# Patient Record
Sex: FEMALE | Race: WHITE | HISPANIC OR LATINO | Employment: UNEMPLOYED | URBAN - METROPOLITAN AREA
[De-identification: names, ages, dates, MRNs, and addresses within clinical notes are randomized per-mention and may not be internally consistent; named-entity substitution may affect disease eponyms.]

---

## 2021-03-02 NOTE — PROGRESS NOTES
CC: Missed menses    Chen Santiago is a 20 y.o. No obstetric history on file. who presents presents due to missed menses. Patient's last menstrual period was 2020. Reports she first had a positive pregnancy test on 21 and is thus is 9w3d based off LMP. She was not using anything for birthcontrol.  This is a somewhat planned and desired pregnancy.   Reports she has been feeling fatigued and nauseous thus far in pregnancy. She denies vomiting, denies headache, denies dysuria, denies  vaginal bleeding/spotting, and reports contractions/cramping.    Partner: Jose    Review of systems:  Pertinent positives documented in HPI and all other systems reviewed & are negative    GYN History:  Patient's last menstrual period was 2020.. Menarche @12.  Menses unsure as she has used OCPs and Nexplanon since menses but has had regular menses since Oct when Nexplanon was taken out.  Hasn't had any paps.  No history of cone biopsy, LEEP or any other cervical, uterine or gynecologic surgery. No history of sexually transmitted diseases.  Currently sexually active with one male partner.  Utilizing nothing for contraception and has used Nexplanon and OCPs in the past.     OB History:    OB History    Para Term  AB Living   1 0 0 0 0 0   SAB TAB Ectopic Molar Multiple Live Births   0 0 0 0 0 0      # Outcome Date GA Lbr Power/2nd Weight Sex Delivery Anes PTL Lv   1 Current                All PMH, PSH, allergies, social history and FH reviewed and updated today:  Past Medical History:  History reviewed. No pertinent past medical history.    Past Surgical History:  History reviewed. No pertinent surgical history.    Medications:   Current Outpatient Medications Ordered in Epic   Medication Sig Dispense Refill   • Prenatal Vit-Fe Fumarate-FA (PRENATAL 1+1 PO) Take  by mouth.       No current Epic-ordered facility-administered medications on file.       Allergies: Patient has no known allergies.    Social  History:  Social History     Socioeconomic History   • Marital status: Unknown     Spouse name: Not on file   • Number of children: Not on file   • Years of education: Not on file   • Highest education level: Not on file   Occupational History   • Not on file   Tobacco Use   • Smoking status: Never Smoker   • Smokeless tobacco: Never Used   Substance and Sexual Activity   • Alcohol use: Not Currently   • Drug use: No   • Sexual activity: Yes     Partners: Male     Birth control/protection: None   Other Topics Concern   • Behavioral problems Not Asked   • Interpersonal relationships Not Asked   • Sad or not enjoying activities Not Asked   • Suicidal thoughts Not Asked   • Poor school performance Not Asked   • Reading difficulties Not Asked   • Speech difficulties Not Asked   • Writing difficulties Not Asked   • Inadequate sleep Not Asked   • Excessive TV viewing Not Asked   • Excessive video game use Not Asked   • Inadequate exercise Not Asked   • Sports related Not Asked   • Poor diet Not Asked   • Family concerns for drug/alcohol abuse Not Asked   • Poor oral hygiene Not Asked   • Bike safety Not Asked   • Family concerns vehicle safety Not Asked   Social History Narrative   • Not on file     Social Determinants of Health     Financial Resource Strain:    • Difficulty of Paying Living Expenses:    Food Insecurity:    • Worried About Running Out of Food in the Last Year:    • Ran Out of Food in the Last Year:    Transportation Needs:    • Lack of Transportation (Medical):    • Lack of Transportation (Non-Medical):    Physical Activity:    • Days of Exercise per Week:    • Minutes of Exercise per Session:    Stress:    • Feeling of Stress :    Social Connections:    • Frequency of Communication with Friends and Family:    • Frequency of Social Gatherings with Friends and Family:    • Attends [employer] Services:    • Active Member of Clubs or Organizations:    • Attends Club or Organization Meetings:    • Marital  Status:    Intimate Partner Violence:    • Fear of Current or Ex-Partner:    • Emotionally Abused:    • Physically Abused:    • Sexually Abused:        Family History:  Family History   Problem Relation Age of Onset   • Diabetes Maternal Grandmother            Objective:   Vitals:  /67   Wt 102 kg (224 lb)   There is no height or weight on file to calculate BMI. (Goal BM I>18 <25)  Exam:  General: appears stated age  Head: normocephalic, non-tender  Neck: neck is supple  Abdomen: Bowel sounds positive, nondistended, soft, nontender x4, no rebound or guarding. No organomegaly. No masses.   Female GYN: normal female external genitalia without lesions  Skin: No rashes, or ulcers or lesions seen  Psychiatric: appropriate affect, alert and oriented x3, intact judgment and insight.    Procedure:  Transvaginal US performed by me and per my read:    Indication: confirm dates/location/viability.     Findings: stack intrauterine pregnancy @ 9w1d by CRL. Positive yolk sac. Positive fetal cardiac activity @ 174 BPM. Right ovary not well seen. Left Ovary WNL. Cervical length 4.6cm. No free fluid in the cul-de-sac.    Impression: viable IUP @ 9w1d.  AYAN by US of 10/6/21.    Recent Results (from the past 336 hour(s))   POCT Pregnancy    Collection Time: 03/04/21  8:30 AM   Result Value Ref Range    POC Urine Pregnancy Test Positive Negative    Internal Control Positive Positive     Internal Control Negative Negative       Pregnancy exam/test positive    A/P:   20 y.o. No obstetric history on file. here for   1. DUB (dysfunctional uterine bleeding)  POCT Pregnancy   2. Missed menses     3. Pregnancy test-positive         #Missed menses - amenorrhea due to pregnancy.  US today is c/w LMP of 12/28/20 giving AYAN of 10/4/21.  Discussed pregnancy with patient who is excited.    --Normal pregnancy s/s discussed  --Advised prenatal vitamins, healthy well rounded diet, adequate hydration, and continued exercise.    #Cervical  cancer screening.  Will need first pap at NOB.  #Genetic screening briefly discussed - patient wants to talk with partner and will address further at fu appt    #Will order prenatal labs and any additional imaging/testing needed at new OB visit  #SAB precautions discussed.  #Follow up in 2-4 weeks for new OB visit.    30 minutes were spent in face-to-face patient contact with patient, greater than 50% of which was was spent in counseling and coordination of care for her newly diagnosed pregnancy including medical and surgical options of care.    TRAM

## 2021-03-04 ENCOUNTER — TELEPHONE (OUTPATIENT)
Dept: OBGYN | Facility: CLINIC | Age: 21
End: 2021-03-04

## 2021-03-04 ENCOUNTER — GYNECOLOGY VISIT (OUTPATIENT)
Dept: OBGYN | Facility: CLINIC | Age: 21
End: 2021-03-04
Payer: COMMERCIAL

## 2021-03-04 VITALS — WEIGHT: 224 LBS | DIASTOLIC BLOOD PRESSURE: 67 MMHG | SYSTOLIC BLOOD PRESSURE: 135 MMHG

## 2021-03-04 DIAGNOSIS — N92.6 MISSED MENSES: ICD-10-CM

## 2021-03-04 DIAGNOSIS — N93.8 DUB (DYSFUNCTIONAL UTERINE BLEEDING): ICD-10-CM

## 2021-03-04 DIAGNOSIS — Z32.01 PREGNANCY TEST-POSITIVE: ICD-10-CM

## 2021-03-04 LAB
INT CON NEG: NEGATIVE
INT CON POS: POSITIVE
POC URINE PREGNANCY TEST: POSITIVE

## 2021-03-04 PROCEDURE — 81025 URINE PREGNANCY TEST: CPT | Performed by: OBSTETRICS & GYNECOLOGY

## 2021-03-04 PROCEDURE — 76830 TRANSVAGINAL US NON-OB: CPT | Performed by: OBSTETRICS & GYNECOLOGY

## 2021-03-04 PROCEDURE — 99203 OFFICE O/P NEW LOW 30 MIN: CPT | Mod: 25 | Performed by: OBSTETRICS & GYNECOLOGY

## 2021-03-04 NOTE — NON-PROVIDER
Pt here today for   Pt states no VB or LOF   Reports +FM  Good # [phone]   Pharmacy Confirmed.

## 2021-03-04 NOTE — TELEPHONE ENCOUNTER
Pt called triage line stating she was seen today and forgot to ask doctor about having some rectal bleeding wants to know if it is ok. Notified pt that it could be due to constipation, pt given remedies for constipation. Pt verbalized understanding.

## 2021-03-15 ENCOUNTER — TELEPHONE (OUTPATIENT)
Dept: OBGYN | Facility: CLINIC | Age: 21
End: 2021-03-15

## 2021-03-15 RX ORDER — NITROFURANTOIN 25; 75 MG/1; MG/1
CAPSULE ORAL
Status: ON HOLD | COMMUNITY
Start: 2020-12-22 | End: 2021-09-16

## 2021-03-15 NOTE — TELEPHONE ENCOUNTER
Patient called and she stated that she was having some cramping with no bleeding and her breath being taken away when she is cramping advised pt that she can take tylenol and use a heating pad with a barrier in between like a wet cloth pt understood and agreed then pt stated that she is having pains under her breast that come and go advised pt to see if the tylenol helps advised pt that she can also address this at her upcoming appointment. Pt agreed and understood.

## 2021-04-01 ENCOUNTER — HOSPITAL ENCOUNTER (OUTPATIENT)
Facility: MEDICAL CENTER | Age: 21
End: 2021-04-01
Attending: OBSTETRICS & GYNECOLOGY
Payer: COMMERCIAL

## 2021-04-01 ENCOUNTER — INITIAL PRENATAL (OUTPATIENT)
Dept: OBGYN | Facility: CLINIC | Age: 21
End: 2021-04-01
Payer: COMMERCIAL

## 2021-04-01 ENCOUNTER — HOSPITAL ENCOUNTER (OUTPATIENT)
Dept: LAB | Facility: MEDICAL CENTER | Age: 21
End: 2021-04-01
Attending: OBSTETRICS & GYNECOLOGY
Payer: COMMERCIAL

## 2021-04-01 VITALS
HEIGHT: 63 IN | HEART RATE: 60 BPM | DIASTOLIC BLOOD PRESSURE: 63 MMHG | WEIGHT: 226 LBS | BODY MASS INDEX: 40.04 KG/M2 | SYSTOLIC BLOOD PRESSURE: 138 MMHG

## 2021-04-01 DIAGNOSIS — O99.212 OBESITY AFFECTING PREGNANCY IN SECOND TRIMESTER: ICD-10-CM

## 2021-04-01 DIAGNOSIS — Z34.02 SUPERVISION OF NORMAL FIRST PREGNANCY IN SECOND TRIMESTER: ICD-10-CM

## 2021-04-01 LAB
ABO GROUP BLD: NORMAL
BLD GP AB SCN SERPL QL: NORMAL
RH BLD: NORMAL

## 2021-04-01 PROCEDURE — 87389 HIV-1 AG W/HIV-1&-2 AB AG IA: CPT

## 2021-04-01 PROCEDURE — 86780 TREPONEMA PALLIDUM: CPT

## 2021-04-01 PROCEDURE — 87491 CHLMYD TRACH DNA AMP PROBE: CPT

## 2021-04-01 PROCEDURE — 86901 BLOOD TYPING SEROLOGIC RH(D): CPT

## 2021-04-01 PROCEDURE — 86803 HEPATITIS C AB TEST: CPT

## 2021-04-01 PROCEDURE — 59401 PR NEW OB VISIT: CPT | Performed by: OBSTETRICS & GYNECOLOGY

## 2021-04-01 PROCEDURE — 36415 COLL VENOUS BLD VENIPUNCTURE: CPT

## 2021-04-01 PROCEDURE — 86850 RBC ANTIBODY SCREEN: CPT

## 2021-04-01 PROCEDURE — 86762 RUBELLA ANTIBODY: CPT

## 2021-04-01 PROCEDURE — 85025 COMPLETE CBC W/AUTO DIFF WBC: CPT

## 2021-04-01 PROCEDURE — 87591 N.GONORRHOEAE DNA AMP PROB: CPT

## 2021-04-01 PROCEDURE — 81003 URINALYSIS AUTO W/O SCOPE: CPT

## 2021-04-01 PROCEDURE — 80307 DRUG TEST PRSMV CHEM ANLYZR: CPT

## 2021-04-01 PROCEDURE — 86900 BLOOD TYPING SEROLOGIC ABO: CPT

## 2021-04-01 PROCEDURE — 87340 HEPATITIS B SURFACE AG IA: CPT

## 2021-04-01 NOTE — PROGRESS NOTES
"Subjective:      Chen Santiago is a 20 y.o. female who presents for new OB            HPI patient is a 20-year-old G1 at 13 weeks and 3 days gestation based on first trimester ultrasound who presents today for new OB exam.  Patient is without any complaints or concerns.  Denies any nausea vomiting fevers or dysuria.  Denies headaches.  Denies bleeding or spotting.  Denies pelvic pain.  Patient is taking prenatal vitamins.    Has no history of PID STDs normal Pap smears.  Had Pap smear recently    Family history of birth defects or chromosomal disorders    Works as a dental hygienist    ROS all organ systems are reviewed and are currently negative       Objective:     /63   Pulse 60   Ht 1.6 m (5' 3\")   Wt 103 kg (226 lb)   LMP 12/28/2020   BMI 40.03 kg/m²      Physical Exam  Vitals and nursing note reviewed. Exam conducted with a chaperone present.   Constitutional:       Appearance: Normal appearance. She is obese. She is not toxic-appearing.   HENT:      Head: Normocephalic and atraumatic.      Nose: Nose normal.   Eyes:      General:         Right eye: No discharge.         Left eye: No discharge.      Conjunctiva/sclera: Conjunctivae normal.   Cardiovascular:      Rate and Rhythm: Normal rate.      Pulses: Normal pulses.      Heart sounds: Normal heart sounds. No murmur.   Pulmonary:      Effort: Pulmonary effort is normal. No respiratory distress.      Breath sounds: Normal breath sounds. No wheezing.   Chest:      Comments: Breast exam deferred by patient  Abdominal:      General: Abdomen is flat. Bowel sounds are normal. There is no distension.      Palpations: Abdomen is soft.      Tenderness: There is no abdominal tenderness.   Genitourinary:     General: Normal vulva.      Labia:         Right: No rash, tenderness, lesion or injury.         Left: No rash, tenderness, lesion or injury.       Urethra: No prolapse.      Vagina: No vaginal discharge, tenderness or bleeding.      Uterus: Enlarged. " Not tender.       Adnexa:         Right: No mass, tenderness or fullness.          Left: No mass, tenderness or fullness.        Rectum: No external hemorrhoid.   Musculoskeletal:         General: Normal range of motion.      Cervical back: Normal range of motion and neck supple. No rigidity.      Right lower leg: No edema.      Left lower leg: No edema.   Lymphadenopathy:      Cervical: No cervical adenopathy.      Lower Body: No right inguinal adenopathy. No left inguinal adenopathy.   Skin:     General: Skin is warm and dry.      Coloration: Skin is not jaundiced.      Findings: No bruising.   Neurological:      General: No focal deficit present.      Mental Status: She is alert and oriented to person, place, and time.      Motor: No weakness.      Gait: Gait normal.   Psychiatric:         Mood and Affect: Mood normal.         Behavior: Behavior normal.         Thought Content: Thought content normal.         Judgment: Judgment normal.                 Assessment/Plan:        1. Supervision of normal first pregnancy in second trimester  20-year-old G1 presenting for new OB exam today.  Exam is within normal limits.  Pregnancy precautions and restrictions discussed.  We discussed lifting restrictions and exercise in pregnancy  We discussed carrier screening options and aneuploidy screening options in pregnancy-and any of these testing    We discussed diet, recommended weight gain and caloric intake, we discussed recommended fluid intake, exercise and travel discussed  - PREG CNTR PRENATAL PN; Future  - URINE DRUG SCREEN; Future  - US-OB 2ND 3RD TRI COMPLETE; Future  - HEP C VIRUS ANTIBODY; Future  - Chlamydia/GC PCR Urine Or Swab; Future    2. Obesity affecting pregnancy in second trimester  We discussed obesity risks and recommended weight gain in pregnancy    3.  Pregnancy precautions and plan of care reviewed.  Patient to follow-up in 4 weeks for OB followup

## 2021-04-01 NOTE — NON-PROVIDER
NOB today  LMP: 12/28/2020  Last pap: requesting  Phone # [phone]  Pharmacy confirmed  On PNV  Cystic Fibrosis test offered.  C/o some cramping

## 2021-04-02 LAB
APPEARANCE UR: CLEAR
BASOPHILS # BLD AUTO: 0.6 % (ref 0–1.8)
BASOPHILS # BLD: 0.1 K/UL (ref 0–0.12)
BILIRUB UR QL STRIP.AUTO: NEGATIVE
COLOR UR: YELLOW
EOSINOPHIL # BLD AUTO: 0.37 K/UL (ref 0–0.51)
EOSINOPHIL NFR BLD: 2.2 % (ref 0–6.9)
ERYTHROCYTE [DISTWIDTH] IN BLOOD BY AUTOMATED COUNT: 41.2 FL (ref 35.9–50)
GLUCOSE UR STRIP.AUTO-MCNC: NEGATIVE MG/DL
HBV SURFACE AG SER QL: ABNORMAL
HCT VFR BLD AUTO: 39.8 % (ref 37–47)
HCV AB SER QL: NORMAL
HGB BLD-MCNC: 13.1 G/DL (ref 12–16)
HIV 1+2 AB+HIV1 P24 AG SERPL QL IA: NORMAL
IMM GRANULOCYTES # BLD AUTO: 0.21 K/UL (ref 0–0.11)
IMM GRANULOCYTES NFR BLD AUTO: 1.3 % (ref 0–0.9)
KETONES UR STRIP.AUTO-MCNC: NEGATIVE MG/DL
LEUKOCYTE ESTERASE UR QL STRIP.AUTO: NEGATIVE
LYMPHOCYTES # BLD AUTO: 3.04 K/UL (ref 1–4.8)
LYMPHOCYTES NFR BLD: 18.2 % (ref 22–41)
MCH RBC QN AUTO: 30 PG (ref 27–33)
MCHC RBC AUTO-ENTMCNC: 32.9 G/DL (ref 33.6–35)
MCV RBC AUTO: 91.1 FL (ref 81.4–97.8)
MICRO URNS: ABNORMAL
MONOCYTES # BLD AUTO: 1.03 K/UL (ref 0–0.85)
MONOCYTES NFR BLD AUTO: 6.2 % (ref 0–13.4)
NEUTROPHILS # BLD AUTO: 11.94 K/UL (ref 2–7.15)
NEUTROPHILS NFR BLD: 71.5 % (ref 44–72)
NITRITE UR QL STRIP.AUTO: NEGATIVE
NRBC # BLD AUTO: 0 K/UL
NRBC BLD-RTO: 0 /100 WBC
PH UR STRIP.AUTO: 6 [PH] (ref 5–8)
PLATELET # BLD AUTO: 279 K/UL (ref 164–446)
PMV BLD AUTO: 12.1 FL (ref 9–12.9)
PROT UR QL STRIP: NEGATIVE MG/DL
RBC # BLD AUTO: 4.37 M/UL (ref 4.2–5.4)
RBC UR QL AUTO: NEGATIVE
RUBV AB SER QL: 254 IU/ML
SP GR UR STRIP.AUTO: 1.01
TREPONEMA PALLIDUM IGG+IGM AB [PRESENCE] IN SERUM OR PLASMA BY IMMUNOASSAY: ABNORMAL
UROBILINOGEN UR STRIP.AUTO-MCNC: 0.2 MG/DL
WBC # BLD AUTO: 16.7 K/UL (ref 4.8–10.8)

## 2021-04-03 LAB
AMPHET CTO UR CFM-MCNC: NEGATIVE NG/ML
BARBITURATES CTO UR CFM-MCNC: NEGATIVE NG/ML
BENZODIAZ CTO UR CFM-MCNC: NEGATIVE NG/ML
C TRACH DNA SPEC QL NAA+PROBE: NEGATIVE
CANNABINOIDS CTO UR CFM-MCNC: NEGATIVE NG/ML
COCAINE CTO UR CFM-MCNC: NEGATIVE NG/ML
DRUG COMMENT 753798: NORMAL
METHADONE CTO UR CFM-MCNC: NEGATIVE NG/ML
N GONORRHOEA DNA SPEC QL NAA+PROBE: NEGATIVE
OPIATES CTO UR CFM-MCNC: NEGATIVE NG/ML
PCP CTO UR CFM-MCNC: NEGATIVE NG/ML
PROPOXYPH CTO UR CFM-MCNC: NEGATIVE NG/ML
SPECIMEN SOURCE: NORMAL

## 2021-04-29 ENCOUNTER — ROUTINE PRENATAL (OUTPATIENT)
Dept: OBGYN | Facility: CLINIC | Age: 21
End: 2021-04-29
Payer: COMMERCIAL

## 2021-04-29 VITALS — WEIGHT: 233.6 LBS | SYSTOLIC BLOOD PRESSURE: 135 MMHG | BODY MASS INDEX: 41.38 KG/M2 | DIASTOLIC BLOOD PRESSURE: 55 MMHG

## 2021-04-29 DIAGNOSIS — O99.212 OBESITY AFFECTING PREGNANCY IN SECOND TRIMESTER: ICD-10-CM

## 2021-04-29 PROBLEM — Z34.02 SUPERVISION OF NORMAL FIRST PREGNANCY IN SECOND TRIMESTER: Status: RESOLVED | Noted: 2021-04-01 | Resolved: 2021-04-29

## 2021-04-29 PROCEDURE — 90040 PR PRENATAL FOLLOW UP: CPT | Performed by: OBSTETRICS & GYNECOLOGY

## 2021-04-29 NOTE — PROGRESS NOTES
OB Follow Up--- High Risk  Intermittent  HA     Elevated BP    , increased  BMI   20 y.o. is a 17w3d presents in prenatal follow up.    Subjective:headaches : non medicated ,   . Fetal Movement  positive - light     Problem List:has BMI (body mass index), pediatric 95-99% for age, obese child structured weight management/multidisciplinary intervention category; Acanthosis nigricans; Supervision of normal first pregnancy in second trimester; and Obesity affecting pregnancy in second trimester on their problem list.     Objective:   /85 (BP Location: Right arm, Patient Position: Sitting)   Wt 106 kg (233 lb 9.6 oz)   LMP 12/28/2020   BMI 41.38 kg/m²   F/U /55    Abdomen:  S=D  Extremities:Normal        Lab:No results found for this or any previous visit (from the past 336 hour(s)).      Assessment:    17w3d   High Risk  Intermittent  HA     Elevated BP    , increased  BMI   No pain, bleeding, unusual discharge or leeking    Plan:  Take tylenol , hydrate , RTC 1 week b=for BP check   At risk for  PIH   Continue water intake  Ambulate nightly after 37 weeks  Continue Kick counts

## 2021-04-29 NOTE — PROGRESS NOTES
OB follow up   + fetal movement.  No VB, LOF or UC's.  Phone # [phone]  Preferred pharmacy confirmed.

## 2021-05-06 ENCOUNTER — NON-PROVIDER VISIT (OUTPATIENT)
Dept: OBGYN | Facility: CLINIC | Age: 21
End: 2021-05-06
Payer: COMMERCIAL

## 2021-05-06 NOTE — NON-PROVIDER
Pt here for BP check per Dr. Loving  Consulted with Dr. Winston and pt BP is great. Gave BP precautions.

## 2021-05-24 ENCOUNTER — HOSPITAL ENCOUNTER (OUTPATIENT)
Dept: RADIOLOGY | Facility: MEDICAL CENTER | Age: 21
End: 2021-05-24
Attending: OBSTETRICS & GYNECOLOGY
Payer: COMMERCIAL

## 2021-05-24 DIAGNOSIS — Z34.02 SUPERVISION OF NORMAL FIRST PREGNANCY IN SECOND TRIMESTER: ICD-10-CM

## 2021-05-24 PROCEDURE — 76805 OB US >/= 14 WKS SNGL FETUS: CPT

## 2021-05-26 ENCOUNTER — DATING (OUTPATIENT)
Dept: OBGYN | Facility: CLINIC | Age: 21
End: 2021-05-26

## 2021-05-27 ENCOUNTER — ROUTINE PRENATAL (OUTPATIENT)
Dept: OBGYN | Facility: CLINIC | Age: 21
End: 2021-05-27
Payer: COMMERCIAL

## 2021-05-27 VITALS — DIASTOLIC BLOOD PRESSURE: 62 MMHG | BODY MASS INDEX: 41.38 KG/M2 | WEIGHT: 233.6 LBS | SYSTOLIC BLOOD PRESSURE: 118 MMHG

## 2021-05-27 DIAGNOSIS — Z34.92 SECOND TRIMESTER PREGNANCY: ICD-10-CM

## 2021-05-27 PROCEDURE — 90040 PR PRENATAL FOLLOW UP: CPT | Performed by: OBSTETRICS & GYNECOLOGY

## 2021-05-27 NOTE — PROGRESS NOTES
Pt's here for OB follow-up  Reports + fetal movement  No VB, LOF or UC's.  Good Phone #:[phone]  Pharmacy verified.  Pt states having mild cramps for on and off.  Pt states no complaints for today.  Pt declines AFP.

## 2021-06-24 ENCOUNTER — ROUTINE PRENATAL (OUTPATIENT)
Dept: OBGYN | Facility: CLINIC | Age: 21
End: 2021-06-24
Payer: COMMERCIAL

## 2021-06-24 VITALS — SYSTOLIC BLOOD PRESSURE: 126 MMHG | DIASTOLIC BLOOD PRESSURE: 58 MMHG

## 2021-06-24 DIAGNOSIS — Z34.00 SUPERVISION OF NORMAL FIRST PREGNANCY, ANTEPARTUM: Primary | ICD-10-CM

## 2021-06-24 PROCEDURE — 90040 PR PRENATAL FOLLOW UP: CPT | Performed by: NURSE PRACTITIONER

## 2021-06-24 NOTE — PROGRESS NOTES
No complaints today  Caught up with first trimester labs and US done  3rd trimester labs ordered  Tdap and FKC sheet next visit   labor precautions reviewed  All questions answered    Kim Stein CNM, APRN

## 2021-06-24 NOTE — NON-PROVIDER
OB follow up   + fetal movement.  No VB, LOF or UC's.  Phone # [phone]  Preferred pharmacy confirmed.

## 2021-07-06 ENCOUNTER — HOSPITAL ENCOUNTER (OUTPATIENT)
Dept: LAB | Facility: MEDICAL CENTER | Age: 21
End: 2021-07-06
Attending: NURSE PRACTITIONER
Payer: COMMERCIAL

## 2021-07-06 DIAGNOSIS — Z34.00 SUPERVISION OF NORMAL FIRST PREGNANCY, ANTEPARTUM: ICD-10-CM

## 2021-07-06 LAB
BASOPHILS # BLD AUTO: 0.6 % (ref 0–1.8)
BASOPHILS # BLD: 0.11 K/UL (ref 0–0.12)
EOSINOPHIL # BLD AUTO: 0.15 K/UL (ref 0–0.51)
EOSINOPHIL NFR BLD: 0.9 % (ref 0–6.9)
ERYTHROCYTE [DISTWIDTH] IN BLOOD BY AUTOMATED COUNT: 44 FL (ref 35.9–50)
GLUCOSE 1H P 50 G GLC PO SERPL-MCNC: 107 MG/DL (ref 70–139)
HCT VFR BLD AUTO: 37.6 % (ref 37–47)
HGB BLD-MCNC: 12.5 G/DL (ref 12–16)
IMM GRANULOCYTES # BLD AUTO: 0.44 K/UL (ref 0–0.11)
IMM GRANULOCYTES NFR BLD AUTO: 2.5 % (ref 0–0.9)
LYMPHOCYTES # BLD AUTO: 2.54 K/UL (ref 1–4.8)
LYMPHOCYTES NFR BLD: 14.4 % (ref 22–41)
MCH RBC QN AUTO: 30.6 PG (ref 27–33)
MCHC RBC AUTO-ENTMCNC: 33.2 G/DL (ref 33.6–35)
MCV RBC AUTO: 91.9 FL (ref 81.4–97.8)
MONOCYTES # BLD AUTO: 1.41 K/UL (ref 0–0.85)
MONOCYTES NFR BLD AUTO: 8 % (ref 0–13.4)
NEUTROPHILS # BLD AUTO: 12.98 K/UL (ref 2–7.15)
NEUTROPHILS NFR BLD: 73.6 % (ref 44–72)
NRBC # BLD AUTO: 0 K/UL
NRBC BLD-RTO: 0 /100 WBC
PLATELET # BLD AUTO: 273 K/UL (ref 164–446)
PMV BLD AUTO: 10.9 FL (ref 9–12.9)
RBC # BLD AUTO: 4.09 M/UL (ref 4.2–5.4)
WBC # BLD AUTO: 17.6 K/UL (ref 4.8–10.8)

## 2021-07-06 PROCEDURE — 86780 TREPONEMA PALLIDUM: CPT

## 2021-07-06 PROCEDURE — 36415 COLL VENOUS BLD VENIPUNCTURE: CPT

## 2021-07-06 PROCEDURE — 85025 COMPLETE CBC W/AUTO DIFF WBC: CPT

## 2021-07-06 PROCEDURE — 82950 GLUCOSE TEST: CPT

## 2021-07-07 LAB — TREPONEMA PALLIDUM IGG+IGM AB [PRESENCE] IN SERUM OR PLASMA BY IMMUNOASSAY: NORMAL

## 2021-07-15 ENCOUNTER — ROUTINE PRENATAL (OUTPATIENT)
Dept: OBGYN | Facility: CLINIC | Age: 21
End: 2021-07-15
Payer: COMMERCIAL

## 2021-07-15 VITALS — BODY MASS INDEX: 43.2 KG/M2 | SYSTOLIC BLOOD PRESSURE: 138 MMHG | DIASTOLIC BLOOD PRESSURE: 66 MMHG | WEIGHT: 243.9 LBS

## 2021-07-15 DIAGNOSIS — O16.3 ELEVATED BLOOD PRESSURE AFFECTING PREGNANCY IN THIRD TRIMESTER, ANTEPARTUM: ICD-10-CM

## 2021-07-15 PROCEDURE — 90040 PR PRENATAL FOLLOW UP: CPT | Performed by: OBSTETRICS & GYNECOLOGY

## 2021-07-15 NOTE — LETTER
"Count Your Baby's Movements  Another step to a healthy delivery  Chen Santiago Candy             Dept: [phone]    How Many Weeks Pregnant:28w3d    Date to Begin Countin/15/2021              How to use this chart    One way for your physician to keep track of your baby's health is by knowing how often the baby moves (or \"kicks\") in your womb.  You can help your physician to do this by using this chart every day.    Every day, you should see how many hours it takes for your baby to move 10 times.  Start in the morning, as soon as you get up.    · First, write down the time your baby moves until you get to 10.  · Check off one box every time your baby moves until you get to 10.  · Write down the time you finished counting in the last column.  · Total how long it took to count up all 10 movements.  · Finally, fill in the box that shows how long this took.  After counting 10 movements, you no longer have to count any more that day.  The next morning, just start counting again as soon as you get up.    What should you call a \"movement\"?  It is hard to say, because it will feel different from one mother to another and from one pregnancy to the next.  The important thing is that you count the movements the same way throughout your pregnancy.  If you have more questions, you should ask your physician.    Count carefully every day!  SAMPLE:  Week 28    How many hours did it take to feel 10 movements?       Start  Time     1     2     3     4     5     6     7     8     9     10   Finish Time   Mon 8:20 ·  ·  ·  ·  ·  ·  ·  ·  ·  ·  11:40                  Sat               Sun                 IMPORTANT: You should contact your physician if it takes more than two hours for you to feel 10 movements.  Each morning, write down the time and start to count the movements of your baby.  Keep track by checking off one box every time you feel one movement.  When you have " "felt 10 \"kicks\", write down the time you finished counting in the last column.  Then fill in the   box (over the check luiz) for the number of hours it took.  Be sure to read the complete instructions on the previous page.            "

## 2021-07-20 ENCOUNTER — HOSPITAL ENCOUNTER (OUTPATIENT)
Dept: LAB | Facility: MEDICAL CENTER | Age: 21
End: 2021-07-20
Attending: OBSTETRICS & GYNECOLOGY
Payer: COMMERCIAL

## 2021-07-20 DIAGNOSIS — O16.3 ELEVATED BLOOD PRESSURE AFFECTING PREGNANCY IN THIRD TRIMESTER, ANTEPARTUM: ICD-10-CM

## 2021-07-20 LAB
ALBUMIN SERPL BCP-MCNC: 3.4 G/DL (ref 3.2–4.9)
ALBUMIN/GLOB SERPL: 1.1 G/DL
ALP SERPL-CCNC: 99 U/L (ref 30–99)
ALT SERPL-CCNC: 14 U/L (ref 2–50)
ANION GAP SERPL CALC-SCNC: 12 MMOL/L (ref 7–16)
AST SERPL-CCNC: 17 U/L (ref 12–45)
BASOPHILS # BLD AUTO: 0.5 % (ref 0–1.8)
BASOPHILS # BLD: 0.09 K/UL (ref 0–0.12)
BILIRUB SERPL-MCNC: <0.2 MG/DL (ref 0.1–1.5)
BUN SERPL-MCNC: 8 MG/DL (ref 8–22)
CALCIUM SERPL-MCNC: 9.2 MG/DL (ref 8.5–10.5)
CHLORIDE SERPL-SCNC: 104 MMOL/L (ref 96–112)
CO2 SERPL-SCNC: 22 MMOL/L (ref 20–33)
CREAT SERPL-MCNC: 0.44 MG/DL (ref 0.5–1.4)
EOSINOPHIL # BLD AUTO: 0.13 K/UL (ref 0–0.51)
EOSINOPHIL NFR BLD: 0.8 % (ref 0–6.9)
ERYTHROCYTE [DISTWIDTH] IN BLOOD BY AUTOMATED COUNT: 43.4 FL (ref 35.9–50)
GLOBULIN SER CALC-MCNC: 3.1 G/DL (ref 1.9–3.5)
GLUCOSE SERPL-MCNC: 84 MG/DL (ref 65–99)
HCT VFR BLD AUTO: 38.3 % (ref 37–47)
HGB BLD-MCNC: 12.6 G/DL (ref 12–16)
IMM GRANULOCYTES # BLD AUTO: 0.7 K/UL (ref 0–0.11)
IMM GRANULOCYTES NFR BLD AUTO: 4 % (ref 0–0.9)
LYMPHOCYTES # BLD AUTO: 2.5 K/UL (ref 1–4.8)
LYMPHOCYTES NFR BLD: 14.4 % (ref 22–41)
MCH RBC QN AUTO: 30.3 PG (ref 27–33)
MCHC RBC AUTO-ENTMCNC: 32.9 G/DL (ref 33.6–35)
MCV RBC AUTO: 92.1 FL (ref 81.4–97.8)
MONOCYTES # BLD AUTO: 1.43 K/UL (ref 0–0.85)
MONOCYTES NFR BLD AUTO: 8.3 % (ref 0–13.4)
NEUTROPHILS # BLD AUTO: 12.46 K/UL (ref 2–7.15)
NEUTROPHILS NFR BLD: 72 % (ref 44–72)
NRBC # BLD AUTO: 0 K/UL
NRBC BLD-RTO: 0 /100 WBC
PLATELET # BLD AUTO: 283 K/UL (ref 164–446)
PMV BLD AUTO: 11.3 FL (ref 9–12.9)
POTASSIUM SERPL-SCNC: 3.9 MMOL/L (ref 3.6–5.5)
PROT SERPL-MCNC: 6.5 G/DL (ref 6–8.2)
RBC # BLD AUTO: 4.16 M/UL (ref 4.2–5.4)
SODIUM SERPL-SCNC: 138 MMOL/L (ref 135–145)
WBC # BLD AUTO: 17.3 K/UL (ref 4.8–10.8)

## 2021-07-20 PROCEDURE — 82570 ASSAY OF URINE CREATININE: CPT

## 2021-07-20 PROCEDURE — 36415 COLL VENOUS BLD VENIPUNCTURE: CPT

## 2021-07-20 PROCEDURE — 80053 COMPREHEN METABOLIC PANEL: CPT

## 2021-07-20 PROCEDURE — 85025 COMPLETE CBC W/AUTO DIFF WBC: CPT

## 2021-07-20 PROCEDURE — 84156 ASSAY OF PROTEIN URINE: CPT

## 2021-07-21 LAB
CREAT UR-MCNC: 95.76 MG/DL
PROT UR-MCNC: 9 MG/DL (ref 0–15)
PROT/CREAT UR: 94 MG/G (ref 10–107)

## 2021-07-26 ENCOUNTER — TELEPHONE (OUTPATIENT)
Dept: OBGYN | Facility: CLINIC | Age: 21
End: 2021-07-26

## 2021-07-26 NOTE — TELEPHONE ENCOUNTER
3:00pm Called patient regarding FMLA request dates.    Patient answered and request dates are 10/1/2021 through 01/01/2022.    Did inform patient that these dates exceed the 12 weeks of FMLA pt understood and stated it is fine. No further questions asked.

## 2021-07-27 ENCOUNTER — TELEPHONE (OUTPATIENT)
Dept: OBGYN | Facility: CLINIC | Age: 21
End: 2021-07-27

## 2021-07-27 NOTE — TELEPHONE ENCOUNTER
Called patient to inform that FMLA is completed with confirmation fax  and will be available for  at .     Patient understood no further questions asked.

## 2021-08-03 ENCOUNTER — ROUTINE PRENATAL (OUTPATIENT)
Dept: OBGYN | Facility: CLINIC | Age: 21
End: 2021-08-03
Payer: COMMERCIAL

## 2021-08-03 VITALS — WEIGHT: 244 LBS | SYSTOLIC BLOOD PRESSURE: 128 MMHG | DIASTOLIC BLOOD PRESSURE: 63 MMHG | BODY MASS INDEX: 43.22 KG/M2

## 2021-08-03 DIAGNOSIS — Z34.00 SUPERVISION OF NORMAL FIRST PREGNANCY, ANTEPARTUM: ICD-10-CM

## 2021-08-03 DIAGNOSIS — O99.213 OBESITY AFFECTING PREGNANCY IN THIRD TRIMESTER: ICD-10-CM

## 2021-08-03 PROCEDURE — 90040 PR PRENATAL FOLLOW UP: CPT | Performed by: OBSTETRICS & GYNECOLOGY

## 2021-08-03 PROCEDURE — 90715 TDAP VACCINE 7 YRS/> IM: CPT | Performed by: OBSTETRICS & GYNECOLOGY

## 2021-08-03 PROCEDURE — 90471 IMMUNIZATION ADMIN: CPT | Performed by: OBSTETRICS & GYNECOLOGY

## 2021-08-03 RX ORDER — BREAST PUMP
EACH MISCELLANEOUS
Qty: 1 EACH | Refills: 0 | Status: SHIPPED | OUTPATIENT
Start: 2021-08-03 | End: 2022-03-01

## 2021-08-03 ASSESSMENT — FIBROSIS 4 INDEX: FIB4 SCORE: 0.34

## 2021-08-03 NOTE — PROGRESS NOTES
S: Pt presents for routine OB follow up. patient is doing well except for some heartburn and we discussed over-the-counter treatment options and dietary modification. Reports good fetal movement.  Denies headaches or scotoma.  Reports normal bowel and bladder functions doing well currently  No contractions, vaginal bleeding, or leakage of fluid.    Questions answered.    O: /63   Wt 111 kg (244 lb)   LMP 12/28/2020   BMI 43.22 kg/m²   Patients' weight gain, fluid intake and exercise level discussed.  Vitals, fundal height , fetal position, and FHR reviewed on flowsheet    Lab:  Recent Results (from the past 336 hour(s))   CBC WITH DIFFERENTIAL    Collection Time: 07/20/21  5:41 PM   Result Value Ref Range    WBC 17.3 (H) 4.8 - 10.8 K/uL    RBC 4.16 (L) 4.20 - 5.40 M/uL    Hemoglobin 12.6 12.0 - 16.0 g/dL    Hematocrit 38.3 37.0 - 47.0 %    MCV 92.1 81.4 - 97.8 fL    MCH 30.3 27.0 - 33.0 pg    MCHC 32.9 (L) 33.6 - 35.0 g/dL    RDW 43.4 35.9 - 50.0 fL    Platelet Count 283 164 - 446 K/uL    MPV 11.3 9.0 - 12.9 fL    Neutrophils-Polys 72.00 44.00 - 72.00 %    Lymphocytes 14.40 (L) 22.00 - 41.00 %    Monocytes 8.30 0.00 - 13.40 %    Eosinophils 0.80 0.00 - 6.90 %    Basophils 0.50 0.00 - 1.80 %    Immature Granulocytes 4.00 (H) 0.00 - 0.90 %    Nucleated RBC 0.00 /100 WBC    Neutrophils (Absolute) 12.46 (H) 2.00 - 7.15 K/uL    Lymphs (Absolute) 2.50 1.00 - 4.80 K/uL    Monos (Absolute) 1.43 (H) 0.00 - 0.85 K/uL    Eos (Absolute) 0.13 0.00 - 0.51 K/uL    Baso (Absolute) 0.09 0.00 - 0.12 K/uL    Immature Granulocytes (abs) 0.70 (H) 0.00 - 0.11 K/uL    NRBC (Absolute) 0.00 K/uL   Comp Metabolic Panel    Collection Time: 07/20/21  5:41 PM   Result Value Ref Range    Sodium 138 135 - 145 mmol/L    Potassium 3.9 3.6 - 5.5 mmol/L    Chloride 104 96 - 112 mmol/L    Co2 22 20 - 33 mmol/L    Anion Gap 12.0 7.0 - 16.0    Glucose 84 65 - 99 mg/dL    Bun 8 8 - 22 mg/dL    Creatinine 0.44 (L) 0.50 - 1.40 mg/dL    Calcium 9.2  8.5 - 10.5 mg/dL    AST(SGOT) 17 12 - 45 U/L    ALT(SGPT) 14 2 - 50 U/L    Alkaline Phosphatase 99 30 - 99 U/L    Total Bilirubin <0.2 0.1 - 1.5 mg/dL    Albumin 3.4 3.2 - 4.9 g/dL    Total Protein 6.5 6.0 - 8.2 g/dL    Globulin 3.1 1.9 - 3.5 g/dL    A-G Ratio 1.1 g/dL   PROTEIN/CREAT RATIO URINE    Collection Time: 21  5:41 PM   Result Value Ref Range    Total Protein, Urine 9.0 0.0 - 15.0 mg/dL    Creatinine, Random Urine 95.76 mg/dL    Protein Creatinine Ratio 94 10 - 107 mg/g   ESTIMATED GFR    Collection Time: 21  5:41 PM   Result Value Ref Range    GFR If African American >60 >60 mL/min/1.73 m 2    GFR If Non African American >60 >60 mL/min/1.73 m 2       A/P:  20 y.o.  at 31w1d presents for routine obstetric follow-up.  Patient is doing well except for heartburn and we discussed use of Tums or Pepcid.  Labs were reviewed with patient  Size equals dates.  Tdap vaccine given today.  We discussed choosing a pediatrician breast-feeding and car seat.  Prescription for breast pump given    1.  Continue prenatal vitamins.  2.  Fetal kick counts daily reviewed   3.  Exercise at least 30 minutes daily.  4.  Drink at least 2L of water daily  5. pregnancy and  Labor precautions educated.  6.  Follow-up in 2 weeks.  7.  GBS @ 36 wks

## 2021-08-11 ENCOUNTER — ROUTINE PRENATAL (OUTPATIENT)
Dept: OBGYN | Facility: CLINIC | Age: 21
End: 2021-08-11
Payer: COMMERCIAL

## 2021-08-11 VITALS — BODY MASS INDEX: 43.93 KG/M2 | WEIGHT: 248 LBS | SYSTOLIC BLOOD PRESSURE: 96 MMHG | DIASTOLIC BLOOD PRESSURE: 66 MMHG

## 2021-08-11 DIAGNOSIS — Z34.00 SUPERVISION OF NORMAL FIRST PREGNANCY, ANTEPARTUM: ICD-10-CM

## 2021-08-11 PROCEDURE — 90040 PR PRENATAL FOLLOW UP: CPT | Performed by: OBSTETRICS & GYNECOLOGY

## 2021-08-11 ASSESSMENT — FIBROSIS 4 INDEX: FIB4 SCORE: 0.34

## 2021-08-11 NOTE — PROGRESS NOTES
OB Followup;    32w2d    Patient Active Problem List    Diagnosis Date Noted   • Supervision of normal first pregnancy, antepartum 2021   • Obesity affecting pregnancy in third trimester 2021   • BMI (body mass index), pediatric 95-99% for age, obese child structured weight management/multidisciplinary intervention category 2016   • Acanthosis nigricans 2016       Vitals:    21 0828 21 0829   BP: 139/69 (!) 96/66   Weight: 112 kg (248 lb)        Patient presents for followup of OB care. Currently doing well . 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    Signs and symptoms of labor/ labor discussed   Discussed our group's policy on prenatal checkups and delivery  Discussed Covid precautions  Discussed Covid vaccination recommendations from CDC/ACOG  Discussed proper exercise during pregnancy  Discussed proper oral fluid hydration  Currently taking prenatal vitamins as instructed  Reviewed fetal kick counts and appropriate fetal movement during pregnancy  Reviewed postpartum birth control methods  All questions answered in detail    Followup in  2 weeks

## 2021-08-24 ENCOUNTER — ROUTINE PRENATAL (OUTPATIENT)
Dept: OBGYN | Facility: CLINIC | Age: 21
End: 2021-08-24
Payer: COMMERCIAL

## 2021-08-24 VITALS — DIASTOLIC BLOOD PRESSURE: 57 MMHG | WEIGHT: 253 LBS | BODY MASS INDEX: 44.82 KG/M2 | SYSTOLIC BLOOD PRESSURE: 132 MMHG

## 2021-08-24 DIAGNOSIS — O99.213 OBESITY AFFECTING PREGNANCY IN THIRD TRIMESTER: ICD-10-CM

## 2021-08-24 DIAGNOSIS — Z34.00 SUPERVISION OF NORMAL FIRST PREGNANCY, ANTEPARTUM: ICD-10-CM

## 2021-08-24 PROCEDURE — 90040 PR PRENATAL FOLLOW UP: CPT | Performed by: OBSTETRICS & GYNECOLOGY

## 2021-08-24 ASSESSMENT — FIBROSIS 4 INDEX: FIB4 SCORE: 0.34

## 2021-08-24 NOTE — PROGRESS NOTES
S: Pt presents for routine OB follow up.  Doing well.  Reports good fetal movement.  No contractions, vaginal bleeding, or leakage of fluid.    Questions answered.    O: /57   Wt 115 kg (253 lb)   LMP 2020   BMI 44.82 kg/m²   Patients' weight gain, fluid intake and exercise level discussed.  Vitals, fundal height , fetal position, and FHR reviewed on flowsheet    Lab:No results found for this or any previous visit (from the past 336 hour(s)).    A/P:  20 y.o.  at 34w1d presents for routine obstetric follow-up.  Doing well  Size equals dates   Encounter Diagnoses   Name Primary?   • Supervision of normal first pregnancy, antepartum    • Obesity affecting pregnancy in third trimester          1.  Continue prenatal vitamins.  2.  Fetal kick counts daily discussed.  3.  Exercise at least 30 minutes daily.  4.  Drink at least 2L of water daily  5.  Pregnancy and  labor precautions educated.  6.  Follow-up in 2 weeks.  7.  GBS culture at 36 weeks discussed

## 2021-08-29 ENCOUNTER — HOSPITAL ENCOUNTER (EMERGENCY)
Facility: MEDICAL CENTER | Age: 21
End: 2021-08-29
Attending: OBSTETRICS & GYNECOLOGY | Admitting: OBSTETRICS & GYNECOLOGY
Payer: COMMERCIAL

## 2021-08-29 VITALS
TEMPERATURE: 97 F | OXYGEN SATURATION: 96 % | RESPIRATION RATE: 16 BRPM | SYSTOLIC BLOOD PRESSURE: 135 MMHG | WEIGHT: 254 LBS | BODY MASS INDEX: 45 KG/M2 | HEART RATE: 77 BPM | HEIGHT: 63 IN | DIASTOLIC BLOOD PRESSURE: 60 MMHG

## 2021-08-29 PROCEDURE — 59025 FETAL NON-STRESS TEST: CPT

## 2021-08-29 PROCEDURE — 302449 STATCHG TRIAGE ONLY (STATISTIC)

## 2021-08-29 ASSESSMENT — FIBROSIS 4 INDEX: FIB4 SCORE: 0.34

## 2021-08-29 NOTE — PROGRESS NOTES
20 y.o.  EDC 10/4 (34.6 wks)     Pt presents to L&D c/o decreased FM since last night. She states baby normally moves the most at night, but wasn't moving much throughout the night. Denies LOF/VB/UCs. Pt states that within the half hour that she's been here, she has felt 4-5 fetal movements and feels reassured. Reactive NST obtained. Dr. Thakkar notified. D/c order received. D/c instructions reviewed with pt.

## 2021-09-07 ENCOUNTER — HOSPITAL ENCOUNTER (EMERGENCY)
Facility: MEDICAL CENTER | Age: 21
End: 2021-09-07
Attending: OBSTETRICS & GYNECOLOGY | Admitting: OBSTETRICS & GYNECOLOGY
Payer: COMMERCIAL

## 2021-09-07 VITALS
TEMPERATURE: 97.9 F | BODY MASS INDEX: 44.65 KG/M2 | OXYGEN SATURATION: 96 % | SYSTOLIC BLOOD PRESSURE: 130 MMHG | RESPIRATION RATE: 18 BRPM | WEIGHT: 252 LBS | DIASTOLIC BLOOD PRESSURE: 64 MMHG | HEART RATE: 66 BPM | HEIGHT: 63 IN

## 2021-09-07 LAB
A1 MICROGLOB PLACENTAL VAG QL: NEGATIVE
ALBUMIN SERPL BCP-MCNC: 3.2 G/DL (ref 3.2–4.9)
ALBUMIN/GLOB SERPL: 1 G/DL
ALP SERPL-CCNC: 182 U/L (ref 30–99)
ALT SERPL-CCNC: 17 U/L (ref 2–50)
ANION GAP SERPL CALC-SCNC: 13 MMOL/L (ref 7–16)
AST SERPL-CCNC: 20 U/L (ref 12–45)
BASOPHILS # BLD AUTO: 0.5 % (ref 0–1.8)
BASOPHILS # BLD: 0.08 K/UL (ref 0–0.12)
BILIRUB SERPL-MCNC: 0.2 MG/DL (ref 0.1–1.5)
BUN SERPL-MCNC: 10 MG/DL (ref 8–22)
CALCIUM SERPL-MCNC: 8.9 MG/DL (ref 8.5–10.5)
CHLORIDE SERPL-SCNC: 104 MMOL/L (ref 96–112)
CO2 SERPL-SCNC: 20 MMOL/L (ref 20–33)
CREAT SERPL-MCNC: 0.43 MG/DL (ref 0.5–1.4)
CREAT UR-MCNC: 104.48 MG/DL
EOSINOPHIL # BLD AUTO: 0.17 K/UL (ref 0–0.51)
EOSINOPHIL NFR BLD: 1.1 % (ref 0–6.9)
ERYTHROCYTE [DISTWIDTH] IN BLOOD BY AUTOMATED COUNT: 45 FL (ref 35.9–50)
GLOBULIN SER CALC-MCNC: 3.3 G/DL (ref 1.9–3.5)
GLUCOSE SERPL-MCNC: 77 MG/DL (ref 65–99)
HCT VFR BLD AUTO: 40.1 % (ref 37–47)
HGB BLD-MCNC: 13.4 G/DL (ref 12–16)
IMM GRANULOCYTES # BLD AUTO: 0.29 K/UL (ref 0–0.11)
IMM GRANULOCYTES NFR BLD AUTO: 1.8 % (ref 0–0.9)
LYMPHOCYTES # BLD AUTO: 2.48 K/UL (ref 1–4.8)
LYMPHOCYTES NFR BLD: 15.8 % (ref 22–41)
MCH RBC QN AUTO: 30.5 PG (ref 27–33)
MCHC RBC AUTO-ENTMCNC: 33.4 G/DL (ref 33.6–35)
MCV RBC AUTO: 91.3 FL (ref 81.4–97.8)
MONOCYTES # BLD AUTO: 1.26 K/UL (ref 0–0.85)
MONOCYTES NFR BLD AUTO: 8 % (ref 0–13.4)
NEUTROPHILS # BLD AUTO: 11.42 K/UL (ref 2–7.15)
NEUTROPHILS NFR BLD: 72.8 % (ref 44–72)
NRBC # BLD AUTO: 0 K/UL
NRBC BLD-RTO: 0 /100 WBC
PLATELET # BLD AUTO: 243 K/UL (ref 164–446)
PMV BLD AUTO: 11.9 FL (ref 9–12.9)
POTASSIUM SERPL-SCNC: 3.8 MMOL/L (ref 3.6–5.5)
PROT SERPL-MCNC: 6.5 G/DL (ref 6–8.2)
PROT UR-MCNC: 10 MG/DL (ref 0–15)
PROT/CREAT UR: 96 MG/G (ref 10–107)
RBC # BLD AUTO: 4.39 M/UL (ref 4.2–5.4)
SODIUM SERPL-SCNC: 137 MMOL/L (ref 135–145)
WBC # BLD AUTO: 15.7 K/UL (ref 4.8–10.8)

## 2021-09-07 PROCEDURE — 59025 FETAL NON-STRESS TEST: CPT

## 2021-09-07 PROCEDURE — 82570 ASSAY OF URINE CREATININE: CPT

## 2021-09-07 PROCEDURE — 80053 COMPREHEN METABOLIC PANEL: CPT

## 2021-09-07 PROCEDURE — 99283 EMERGENCY DEPT VISIT LOW MDM: CPT

## 2021-09-07 PROCEDURE — 36415 COLL VENOUS BLD VENIPUNCTURE: CPT

## 2021-09-07 PROCEDURE — 84112 EVAL AMNIOTIC FLUID PROTEIN: CPT

## 2021-09-07 PROCEDURE — 99283 EMERGENCY DEPT VISIT LOW MDM: CPT | Performed by: NURSE PRACTITIONER

## 2021-09-07 PROCEDURE — 85025 COMPLETE CBC W/AUTO DIFF WBC: CPT

## 2021-09-07 PROCEDURE — 84156 ASSAY OF PROTEIN URINE: CPT

## 2021-09-07 ASSESSMENT — ENCOUNTER SYMPTOMS
EYES NEGATIVE: 1
PSYCHIATRIC NEGATIVE: 1
RESPIRATORY NEGATIVE: 1
GASTROINTESTINAL NEGATIVE: 1
MUSCULOSKELETAL NEGATIVE: 1
NEUROLOGICAL NEGATIVE: 1
CARDIOVASCULAR NEGATIVE: 1
CONSTITUTIONAL NEGATIVE: 1

## 2021-09-07 ASSESSMENT — FIBROSIS 4 INDEX: FIB4 SCORE: 0.34

## 2021-09-08 NOTE — DISCHARGE INSTRUCTIONS
General Instructions:  · If you think you are in labor, time contractions (lying on your left side) from the beginning of one contraction to the beginning of the next contraction for at least one hour.  · Increase fluid intake: you should consume 10-12 8 oz glasses of non-caffeinated fluid per day.  · Report any pressure or burning on urination to your physician.  · Monitor fetal movement: If you notice an absence or decrease in fetal movement, drink a large glass of water and rest on your side.  If there is no increase in movement, call your physician or go to the hospital for further evaluation.  · Report any sudden, sharp abdominal pain.  · Report any bleeding.  Spotting or pinkish discharge is normal after vaginal exam.  You may also spot after sexual intercourse.    Labor Instructions (37 - 39 weeks):  Call your physician or return to hospital if:  · You have regular contractions that get progressively closer, longer and stronger.  · Your water breaks (remember time and color).  · You have bleeding like a period.  · Your baby does not move enough to complete the daily kick counts (10 movements in 2 hours)  · Your baby moves much less often than on the days before or you have not felt your baby move all day.    High Blood Pressure:  · Rest on your right or left side.  · Report any severe headaches, dizziness, blurred vision or spots before your eyes.  · Report excessive swelling of your hands, face or feet.  · Report epigastric pain (upper abdominal pain)      Other Instructions:  Please carefully review your entire AFTER VISIT SUMMARY document for all discharge instructions.    Scheduled Induction of Labor Monday 9/13/21 at 0900  Please call Labor and Delivery at 0800 to confirm room [phone]

## 2021-09-08 NOTE — PROGRESS NOTES
2010) Amnisure negative, SVE FT/thick  2030) LUIS Adams at bedside to assess, BP elevated, order received.   2200) Results reviewed with LUIS Adams CNM, in  to discuss POC with pt, pt scheduled for IOL 9/13 @ 0900 for GHTN.  Pt agrees to POC  2212) Pt given preeclampsia and labor instructions, pt verbalizes understanding discharged to home with family

## 2021-09-08 NOTE — PROGRESS NOTES
- Pt arrived to labor and delivery c/o leaking of fluid. Pt is 36.1 weeks and . Vital signs obtained. MFTI completed. External toco and FHM applied to pt. NANCY Siegel aware of pt's arrival. Orders received to collect amnisure.     - Amnisure sent to lab.

## 2021-09-08 NOTE — ED PROVIDER NOTES
Emergency Obstetric Consultation     Date of Service  2021    Reason for Consultation  Chief Complaint   Patient presents with   • Rupture of Membranes       History of Presenting Illness  20 y.o. female who presented 2021 with possible ROM.  She denies vaginal bleeding or contractions.  She reports normal FM. She has had 1 elevated BP in the office of 140's/80, and another on a separate visit of 153/85, and today BP x 2 in 140's.  She denies headache, epigastric pain or visual changes.      Review of Systems  Review of Systems   Constitutional: Negative.    HENT: Negative.    Eyes: Negative.    Respiratory: Negative.    Cardiovascular: Negative.    Gastrointestinal: Negative.    Genitourinary: Negative.    Musculoskeletal: Negative.    Skin: Negative.    Neurological: Negative.    Endo/Heme/Allergies: Negative.    Psychiatric/Behavioral: Negative.    All other systems reviewed and are negative.      Obstetric History    OB History    Para Term  AB Living   1 0 0 0 0 0   SAB TAB Ectopic Molar Multiple Live Births   0 0 0 0 0 0      # Outcome Date GA Lbr Power/2nd Weight Sex Delivery Anes PTL Lv   1 Current                Gynecologic History  Patient's last menstrual period was 2020.    Medical History  No past medical history on file.    Surgical History   has no past surgical history on file.    Family History  family history includes Diabetes in her sister; Heart Disease in her father; No Known Problems in her mother.    Social History   reports that she has never smoked. She has never used smokeless tobacco. She reports previous alcohol use. She reports that she does not use drugs.    Medications  Medications Prior to Admission   Medication Sig Dispense Refill Last Dose   • Misc. Devices (BREAST PUMP) Misc Breast pump and supplies 1 Each 0    • nitrofurantoin (MACROBID) 100 MG Cap TAKE 1 CAPSULE BY MOUTH EVERY 12 HOURS WITH FOOD FOR 5 DAYS (Patient not taking: Reported on  7/15/2021)      • Prenatal Vit-Fe Fumarate-FA (PRENATAL 1+1 PO) Take  by mouth.          Allergies  No Known Allergies    Physical Exam  Mode: ultrasound.    Baseline rate: 150.   Variability: moderate (6-25 bpm).      Fetal State Assessment: Category I - tracings are normal.      Eyes:      Extraocular Movements: Extraocular movements intact.      Conjunctiva/sclera: Conjunctivae normal.      Pupils: Pupils are equal, round, and reactive to light.   Skin:     General: Skin is warm and dry.      Capillary Refill: Capillary refill takes less than 2 seconds.   HENT:      Head: Normocephalic and atraumatic.      Right Ear: External ear normal.      Left Ear: External ear normal.   Cardiovascular:      Rate and Rhythm: Normal rate and regular rhythm.      Pulses: Normal pulses.      Heart sounds: Normal heart sounds.   Musculoskeletal:         General: No swelling. Normal range of motion.      Cervical back: Normal range of motion.   Constitutional:       Appearance: Normal appearance. She is obese.   Pulmonary:      Effort: Pulmonary effort is normal. No respiratory distress.      Breath sounds: Normal breath sounds.   Psychiatric:         Mood and Affect: Mood normal.         Thought Content: Thought content normal.         Judgment: Judgment normal.   Neurological:      General: No focal deficit present.      Mental Status: She is alert and oriented to person, place, and time. Mental status is at baseline.      Deep Tendon Reflexes: Reflexes are normal and symmetric.   Vitals and nursing note reviewed.         Laboratory               No results for input(s): NTPROBNP in the last 72 hours.         Imaging  5/24/21 anatomy scan WNL    Assessment & Plan  Assessment:  Membrane status: intact.   Fetal well-being: normal.   Category 1 FHT  Amnisure negative  Elevated BP: gHTN vs pre-eclampsia    Plan:  Will do pre-eclampsia labs.   If normal will discharge and plan for IOL at 37 wks for gHTN    Addendum:    Pre-e labs  SAI.  Has appt in office Thursday  IOL booked for Monday at 0900 for GHTN with ROSANA Nino approval  Discussed s/s of pre-e and need to f/u if occurs      LILIAM Iqbal.

## 2021-09-09 ENCOUNTER — ROUTINE PRENATAL (OUTPATIENT)
Dept: OBGYN | Facility: CLINIC | Age: 21
End: 2021-09-09
Payer: COMMERCIAL

## 2021-09-09 ENCOUNTER — HOSPITAL ENCOUNTER (OUTPATIENT)
Facility: MEDICAL CENTER | Age: 21
End: 2021-09-09
Attending: OBSTETRICS & GYNECOLOGY
Payer: COMMERCIAL

## 2021-09-09 VITALS — BODY MASS INDEX: 45.06 KG/M2 | DIASTOLIC BLOOD PRESSURE: 77 MMHG | SYSTOLIC BLOOD PRESSURE: 137 MMHG | WEIGHT: 254.4 LBS

## 2021-09-09 DIAGNOSIS — Z34.93 THIRD TRIMESTER PREGNANCY: ICD-10-CM

## 2021-09-09 PROCEDURE — 87081 CULTURE SCREEN ONLY: CPT

## 2021-09-09 PROCEDURE — 87150 DNA/RNA AMPLIFIED PROBE: CPT

## 2021-09-09 PROCEDURE — 90040 PR PRENATAL FOLLOW UP: CPT | Performed by: OBSTETRICS & GYNECOLOGY

## 2021-09-09 ASSESSMENT — FIBROSIS 4 INDEX: FIB4 SCORE: 0.42

## 2021-09-10 ENCOUNTER — HOSPITAL ENCOUNTER (OUTPATIENT)
Dept: OBGYN | Facility: MEDICAL CENTER | Age: 21
End: 2021-09-10
Attending: OBSTETRICS & GYNECOLOGY
Payer: COMMERCIAL

## 2021-09-10 DIAGNOSIS — Z34.93 THIRD TRIMESTER PREGNANCY: ICD-10-CM

## 2021-09-10 LAB
SARS-COV-2 RNA RESP QL NAA+PROBE: NOTDETECTED
SPECIMEN SOURCE: NORMAL

## 2021-09-10 PROCEDURE — U0003 INFECTIOUS AGENT DETECTION BY NUCLEIC ACID (DNA OR RNA); SEVERE ACUTE RESPIRATORY SYNDROME CORONAVIRUS 2 (SARS-COV-2) (CORONAVIRUS DISEASE [COVID-19]), AMPLIFIED PROBE TECHNIQUE, MAKING USE OF HIGH THROUGHPUT TECHNOLOGIES AS DESCRIBED BY CMS-2020-01-R: HCPCS

## 2021-09-10 PROCEDURE — U0005 INFEC AGEN DETEC AMPLI PROBE: HCPCS

## 2021-09-11 LAB — GP B STREP DNA SPEC QL NAA+PROBE: NEGATIVE

## 2021-09-13 ENCOUNTER — HOSPITAL ENCOUNTER (INPATIENT)
Facility: MEDICAL CENTER | Age: 21
LOS: 3 days | End: 2021-09-16
Attending: OBSTETRICS & GYNECOLOGY | Admitting: FAMILY MEDICINE
Payer: COMMERCIAL

## 2021-09-13 ENCOUNTER — APPOINTMENT (OUTPATIENT)
Dept: OBGYN | Facility: MEDICAL CENTER | Age: 21
End: 2021-09-13
Attending: OBSTETRICS & GYNECOLOGY
Payer: COMMERCIAL

## 2021-09-13 LAB
ALBUMIN SERPL BCP-MCNC: 3 G/DL (ref 3.2–4.9)
ALBUMIN/GLOB SERPL: 1 G/DL
ALP SERPL-CCNC: 182 U/L (ref 30–99)
ALT SERPL-CCNC: 15 U/L (ref 2–50)
ANION GAP SERPL CALC-SCNC: 11 MMOL/L (ref 7–16)
AST SERPL-CCNC: 16 U/L (ref 12–45)
BASOPHILS # BLD AUTO: 0.6 % (ref 0–1.8)
BASOPHILS # BLD: 0.09 K/UL (ref 0–0.12)
BILIRUB SERPL-MCNC: <0.2 MG/DL (ref 0.1–1.5)
BUN SERPL-MCNC: 9 MG/DL (ref 8–22)
CALCIUM SERPL-MCNC: 9.1 MG/DL (ref 8.5–10.5)
CHLORIDE SERPL-SCNC: 106 MMOL/L (ref 96–112)
CO2 SERPL-SCNC: 21 MMOL/L (ref 20–33)
CREAT SERPL-MCNC: 0.35 MG/DL (ref 0.5–1.4)
EOSINOPHIL # BLD AUTO: 0.1 K/UL (ref 0–0.51)
EOSINOPHIL NFR BLD: 0.7 % (ref 0–6.9)
ERYTHROCYTE [DISTWIDTH] IN BLOOD BY AUTOMATED COUNT: 47.1 FL (ref 35.9–50)
GLOBULIN SER CALC-MCNC: 3 G/DL (ref 1.9–3.5)
GLUCOSE SERPL-MCNC: 90 MG/DL (ref 65–99)
HCT VFR BLD AUTO: 43.1 % (ref 37–47)
HGB BLD-MCNC: 13.8 G/DL (ref 12–16)
HOLDING TUBE BB 8507: NORMAL
IMM GRANULOCYTES # BLD AUTO: 0.33 K/UL (ref 0–0.11)
IMM GRANULOCYTES NFR BLD AUTO: 2.3 % (ref 0–0.9)
LYMPHOCYTES # BLD AUTO: 1.97 K/UL (ref 1–4.8)
LYMPHOCYTES NFR BLD: 13.9 % (ref 22–41)
MCH RBC QN AUTO: 30.3 PG (ref 27–33)
MCHC RBC AUTO-ENTMCNC: 32 G/DL (ref 33.6–35)
MCV RBC AUTO: 94.7 FL (ref 81.4–97.8)
MONOCYTES # BLD AUTO: 0.9 K/UL (ref 0–0.85)
MONOCYTES NFR BLD AUTO: 6.3 % (ref 0–13.4)
NEUTROPHILS # BLD AUTO: 10.83 K/UL (ref 2–7.15)
NEUTROPHILS NFR BLD: 76.2 % (ref 44–72)
NRBC # BLD AUTO: 0 K/UL
NRBC BLD-RTO: 0 /100 WBC
PLATELET # BLD AUTO: 243 K/UL (ref 164–446)
PMV BLD AUTO: 11.8 FL (ref 9–12.9)
POTASSIUM SERPL-SCNC: 4.1 MMOL/L (ref 3.6–5.5)
PROT SERPL-MCNC: 6 G/DL (ref 6–8.2)
RBC # BLD AUTO: 4.55 M/UL (ref 4.2–5.4)
SODIUM SERPL-SCNC: 138 MMOL/L (ref 135–145)
WBC # BLD AUTO: 14.2 K/UL (ref 4.8–10.8)

## 2021-09-13 PROCEDURE — 36415 COLL VENOUS BLD VENIPUNCTURE: CPT

## 2021-09-13 PROCEDURE — 700102 HCHG RX REV CODE 250 W/ 637 OVERRIDE(OP): Performed by: FAMILY MEDICINE

## 2021-09-13 PROCEDURE — 770002 HCHG ROOM/CARE - OB PRIVATE (112)

## 2021-09-13 PROCEDURE — 80053 COMPREHEN METABOLIC PANEL: CPT

## 2021-09-13 PROCEDURE — 85025 COMPLETE CBC W/AUTO DIFF WBC: CPT

## 2021-09-13 PROCEDURE — 700111 HCHG RX REV CODE 636 W/ 250 OVERRIDE (IP): Performed by: FAMILY MEDICINE

## 2021-09-13 PROCEDURE — 500726 HCHG BAKRI TAPENADE BALLOON

## 2021-09-13 PROCEDURE — A9270 NON-COVERED ITEM OR SERVICE: HCPCS | Performed by: FAMILY MEDICINE

## 2021-09-13 PROCEDURE — 700105 HCHG RX REV CODE 258: Performed by: FAMILY MEDICINE

## 2021-09-13 RX ORDER — IBUPROFEN 600 MG/1
600 TABLET ORAL EVERY 6 HOURS PRN
Status: DISCONTINUED | OUTPATIENT
Start: 2021-09-13 | End: 2021-09-16 | Stop reason: HOSPADM

## 2021-09-13 RX ORDER — CARBOPROST TROMETHAMINE 250 UG/ML
250 INJECTION, SOLUTION INTRAMUSCULAR
Status: DISCONTINUED | OUTPATIENT
Start: 2021-09-13 | End: 2021-09-14 | Stop reason: HOSPADM

## 2021-09-13 RX ORDER — ACETAMINOPHEN 325 MG/1
325 TABLET ORAL EVERY 4 HOURS PRN
Status: DISCONTINUED | OUTPATIENT
Start: 2021-09-13 | End: 2021-09-16

## 2021-09-13 RX ORDER — MISOPROSTOL 200 UG/1
800 TABLET ORAL
Status: DISCONTINUED | OUTPATIENT
Start: 2021-09-13 | End: 2021-09-14 | Stop reason: HOSPADM

## 2021-09-13 RX ORDER — SODIUM CHLORIDE, SODIUM LACTATE, POTASSIUM CHLORIDE, CALCIUM CHLORIDE 600; 310; 30; 20 MG/100ML; MG/100ML; MG/100ML; MG/100ML
INJECTION, SOLUTION INTRAVENOUS CONTINUOUS
Status: ACTIVE | OUTPATIENT
Start: 2021-09-13 | End: 2021-09-13

## 2021-09-13 RX ORDER — ACETAMINOPHEN 325 MG/1
650 TABLET ORAL EVERY 4 HOURS PRN
Status: DISCONTINUED | OUTPATIENT
Start: 2021-09-13 | End: 2021-09-16 | Stop reason: HOSPADM

## 2021-09-13 RX ORDER — HYDROCODONE BITARTRATE AND ACETAMINOPHEN 5; 325 MG/1; MG/1
2 TABLET ORAL EVERY 4 HOURS PRN
Status: DISCONTINUED | OUTPATIENT
Start: 2021-09-13 | End: 2021-09-16

## 2021-09-13 RX ADMIN — SODIUM CHLORIDE, POTASSIUM CHLORIDE, SODIUM LACTATE AND CALCIUM CHLORIDE: 600; 310; 30; 20 INJECTION, SOLUTION INTRAVENOUS at 19:39

## 2021-09-13 RX ADMIN — SODIUM CHLORIDE, POTASSIUM CHLORIDE, SODIUM LACTATE AND CALCIUM CHLORIDE: 600; 310; 30; 20 INJECTION, SOLUTION INTRAVENOUS at 10:30

## 2021-09-13 RX ADMIN — MISOPROSTOL 25 MCG: 100 TABLET ORAL at 12:00

## 2021-09-13 RX ADMIN — OXYTOCIN 1 MILLI-UNITS/MIN: 10 INJECTION, SOLUTION INTRAMUSCULAR; INTRAVENOUS at 19:38

## 2021-09-13 ASSESSMENT — LIFESTYLE VARIABLES
TOTAL SCORE: 0
TOTAL SCORE: 0
EVER_SMOKED: NEVER
CONSUMPTION TOTAL: INCOMPLETE
ALCOHOL_USE: NO
TOTAL SCORE: 0
EVER HAD A DRINK FIRST THING IN THE MORNING TO STEADY YOUR NERVES TO GET RID OF A HANGOVER: NO
HAVE PEOPLE ANNOYED YOU BY CRITICIZING YOUR DRINKING: NO
EVER FELT BAD OR GUILTY ABOUT YOUR DRINKING: NO
HAVE YOU EVER FELT YOU SHOULD CUT DOWN ON YOUR DRINKING: NO

## 2021-09-13 ASSESSMENT — COPD QUESTIONNAIRES
COPD SCREENING SCORE: 0
DURING THE PAST 4 WEEKS HOW MUCH DID YOU FEEL SHORT OF BREATH: NONE/LITTLE OF THE TIME
IN THE PAST 12 MONTHS DO YOU DO LESS THAN YOU USED TO BECAUSE OF YOUR BREATHING PROBLEMS: DISAGREE/UNSURE
DO YOU EVER COUGH UP ANY MUCUS OR PHLEGM?: NO/ONLY WITH OCCASIONAL COLDS OR INFECTIONS
HAVE YOU SMOKED AT LEAST 100 CIGARETTES IN YOUR ENTIRE LIFE: NO/DON'T KNOW

## 2021-09-13 ASSESSMENT — PAIN SCALES - GENERAL: PAINLEVEL: 0 - NO PAIN

## 2021-09-13 ASSESSMENT — PATIENT HEALTH QUESTIONNAIRE - PHQ9
1. LITTLE INTEREST OR PLEASURE IN DOING THINGS: NOT AT ALL
SUM OF ALL RESPONSES TO PHQ9 QUESTIONS 1 AND 2: 0
2. FEELING DOWN, DEPRESSED, IRRITABLE, OR HOPELESS: NOT AT ALL

## 2021-09-13 ASSESSMENT — FIBROSIS 4 INDEX: FIB4 SCORE: 0.42

## 2021-09-13 NOTE — CARE PLAN
Problem: Risk for Infection and Impaired Wound Healing  Goal: Patient will remain free from infection  Outcome: Progressing  Note: Pt remains free from signs/symptoms of infection, pt afebrile at this time.      Problem: Pain  Goal: Patient's pain will be alleviated or reduced to the patient’s comfort goal  Flowsheets  Taken 9/13/2021 1058  OB Pain Intervention: Declines  Taken 9/13/2021 1000  OB Pain Level: 0-No Pain  Note: Pt comfortable at this time.

## 2021-09-13 NOTE — NON-PROVIDER
"S: Pt is resting in bed with family at bedside. She is experiencing mild contractions but states they are manageable for her. Discussed plan of care with patient and option of placing cervical ripening balloon. Eduction provided, risks and benefits reviewed. Patient agreeable to plan and denies questions.      O:    Vitals:    21 0946 21 1300   BP: 137/65 128/58   Pulse: 79 (!) 57   Resp: 18 18   Temp: 36.1 °C (97 °F) 36.7 °C (98 °F)   TempSrc: Temporal Temporal   SpO2: 97%    Weight: 115 kg (254 lb)    Height: 1.6 m (5' 3\")            FHTs:  Baseline 135, + accels, - decels, moderate variability        [place]: Contractions q 2-6 minutes, mild to palpation        SVE: 50/-3   After obtaining verbal consent, cook catheter was placed in sterile fashion through cervix and filled with NS 60ml U/60ml V. Tolerated well by patient.     A/P:    1.  IUP @ 37w0d   2.  Cat 1 FHTs    3.  IOL: cook balloon placed, start pitocin  4.  GHTN: Continue with IOL management  5. Anticipate     Sarah Asif, Student Nurse Midwife  "

## 2021-09-13 NOTE — PROGRESS NOTES
EDC     10/4/2021    EGA    37w0d    0941: TOCO/US applied, pt educated. Pt presents for scheduled in IOL at 37 weeks gestation due to gestational hypertension. Pt declines any other complications during pregnancy. Pt declines LOF, VB, states she feels occasional cramping here and there, but declines UC's. Pt states +FM. SO Jose at bedside with patient, POC discussed, all questions and concerns addressed.      1010: IV started, labs collected and sent down, 500 mL bolus of LR given for dehydration.     1019: SVE 1/thick/-3    1603: SVE /-2    1620: Dr. Bailey updated with patient's status, states will come to bedside to place an obstetric balloon.     1626: Dr. Bailey at bedside with student nurse midwife Sarah to place an obstetric balloon. 60 mL placed uterine and 60 mL place vaginally.     : Report given to CINDY Martinez.

## 2021-09-13 NOTE — H&P
LABOR AND DELIVERY HISTORY AND PHYSICAL    PATIENT ID:  NAME:  Chen Gupta  MRN:               [id number]  YOB: 2000    CC:  IOL -gHTN    HPI:  Chen Gupta is a 20 y.o. female  at 37w0d by a 9 week ultrasound performed on 21 c/w LMP on Patient's last menstrual period was 2020.. Estimated Date of Delivery: 10/4/21  Patient presents complaining of no uterine contractions, with no loss of fluid.  normal fetal movement.  no vaginal bleeding.  Pregnancy was complicated by gHTN, obesity.    ROS: Patient denies any fever chills, nausea, vomiting, headache, chest pain, shortness of breath, or dysuria or unusual swelling of hands or feet.     Prenatal Care: Obtained at [facility], 1st visit 20 with 11 total visits.  Third trimester BPs were approximately 130/70s.  Total weight gain 13 kg during the pregnancy.    Prenatal Labs:   HepBsAg: Neg HIV: Neg Rubella: Imm   RPR: Neg PAP: Unk GBS: Neg   GC/CT: Neg O+ / ABS Neg Quad Screen: unk   1hr GTT: 107 3hr GTT: N/A HepC: Neg     Recent Labs     21  1010   WBC 14.2*   RBC 4.55   HEMOGLOBIN 13.8   HEMATOCRIT 43.1   MCV 94.7   MCH 30.3   RDW 47.1   PLATELETCT 243   MPV 11.8   NEUTSPOLYS 76.20*   LYMPHOCYTES 13.90*   MONOCYTES 6.30   EOSINOPHILS 0.70   BASOPHILS 0.60     No results for input(s): SODIUM, POTASSIUM, CHLORIDE, CO2, GLUCOSE, BUN, CPKTOTAL in the last 72 hours.       IMAGING:  OB ultrasound:   2021 4:07 PM     HISTORY/REASON FOR EXAM:  Routine screening for abnormality        TECHNIQUE/EXAM DESCRIPTION: OB complete ultrasound.     COMPARISON:  None     FINDINGS:  Fetal Lie:  LMP:  2020  Clinical AYAN by LMP:  10/4/2021     Placenta (Location):  Placenta Previa:  Placental Grade:     Amniotic Fluid Volume:  SUBHASH = 13.3 cm     Fetal Heart Rate:  150 bpm     Cervical Length:  4.56 cm     No maternal adnexal mass is identified.     Fetal Anatomy  (Seen or Not Seen)  Lateral Ventricles     Seen  Cisterna Magna         Seen  Cerebellum              Seen  CSP             Seen  Orbits             Seen  Face/Lips                Seen  Cord Insertion         Seen  Placental CI         Seen  4 Chamber Heart     Seen  LVOT               Seen  RVOT              Seen  3 Vessel View     Seen  Stomach       Seen  Kidneys                   Seen  Urinary Bladder      Seen  Spine                       Seen  3 Vessel Cord          Seen  Both Upper Extremities    Seen  Both Lower Extremities    Seen  Diaphragm             Seen  Movement       Seen        Fetal Biometry  BPD    5.10 cm, [place] 21w 3d, (67%)  HC    18.80 cm, [place] 21w 1d, (45%)  AC    16.88 cm, [place] 21w 6d, (72%)  Femur Length , [place] 21w 1d, (48%)  Humerus Length    3.40 cm, Jhon 21w 4d, (50%)  Cerebellum Diameter   2.36 cm, , ( )     EGA by this US:  Average 21w 3d  AYAN by this US: 10/1/2021     Estimated Fetal Weight:  430 g  EFW Percentile: 73%     Comments:  Size equal dates     IMPRESSION:     Single intrauterine pregnancy of an estimated gestational age of Average 21w 3d with an estimated date of delivery of 10/1/2021.     Fetal survey within normal limits.    POB Hx:  OB History    Para Term  AB Living   1 0 0 0 0 0   SAB TAB Ectopic Molar Multiple Live Births   0 0 0 0 0 0      # Outcome Date GA Lbr Power/2nd Weight Sex Delivery Anes PTL Lv   1 Current                PMH/Problem List:    No past medical history on file.  Patient Active Problem List    Diagnosis Date Noted   • Supervision of normal first pregnancy, antepartum 2021   • Obesity affecting pregnancy in third trimester 2021   • BMI (body mass index), pediatric 95-99% for age, obese child structured weight management/multidisciplinary intervention category 2016   • Acanthosis nigricans 2016       Current Outpatient Medications:  No current facility-administered medications on file prior to encounter.     Current Outpatient Medications on File Prior to Encounter    Medication Sig Dispense Refill   • Misc. Devices (BREAST PUMP) Misc Breast pump and supplies 1 Each 0   • nitrofurantoin (MACROBID) 100 MG Cap TAKE 1 CAPSULE BY MOUTH EVERY 12 HOURS WITH FOOD FOR 5 DAYS     • Prenatal Vit-Fe Fumarate-FA (PRENATAL 1+1 PO) Take  by mouth.         PSH:    No past surgical history on file.    Allergies:   No Known Allergies    SH:  Social History     Socioeconomic History   • Marital status:      Spouse name: Not on file   • Number of children: Not on file   • Years of education: Not on file   • Highest education level: Not on file   Occupational History   • Not on file   Tobacco Use   • Smoking status: Never Smoker   • Smokeless tobacco: Never Used   Vaping Use   • Vaping Use: Never used   Substance and Sexual Activity   • Alcohol use: Not Currently   • Drug use: No   • Sexual activity: Yes     Partners: Male     Birth control/protection: None   Other Topics Concern   • Behavioral problems Not Asked   • Interpersonal relationships Not Asked   • Sad or not enjoying activities Not Asked   • Suicidal thoughts Not Asked   • Poor school performance Not Asked   • Reading difficulties Not Asked   • Speech difficulties Not Asked   • Writing difficulties Not Asked   • Inadequate sleep Not Asked   • Excessive TV viewing Not Asked   • Excessive video game use Not Asked   • Inadequate exercise Not Asked   • Sports related Not Asked   • Poor diet Not Asked   • Family concerns for drug/alcohol abuse Not Asked   • Poor oral hygiene Not Asked   • Bike safety Not Asked   • Family concerns vehicle safety Not Asked   Social History Narrative   • Not on file     Social Determinants of Health     Financial Resource Strain:    • Difficulty of Paying Living Expenses:    Food Insecurity:    • Worried About Running Out of Food in the Last Year:    • Ran Out of Food in the Last Year:    Transportation Needs:    • Lack of Transportation (Medical):    • Lack of Transportation (Non-Medical):    Physical  "Activity:    • Days of Exercise per Week:    • Minutes of Exercise per Session:    Stress:    • Feeling of Stress :    Social Connections:    • Frequency of Communication with Friends and Family:    • Frequency of Social Gatherings with Friends and Family:    • Attends [employer] Services:    • Active Member of Clubs or Organizations:    • Attends Club or Organization Meetings:    • Marital Status:    Intimate Partner Violence:    • Fear of Current or Ex-Partner:    • Emotionally Abused:    • Physically Abused:    • Sexually Abused:          PHYSICAL EXAM:  Vitals:    21 0946   BP: 137/65   Pulse: 79   Resp: 18   Temp: 36.1 °C (97 °F)   TempSrc: Temporal   SpO2: 97%   Weight: 115 kg (254 lb)   Height: 1.6 m (5' 3\")     Temp (24hrs), Av.1 °C (97 °F), Min:36.1 °C (97 °F), Max:36.1 °C (97 °F)    General: No acute distress, resting comfortably in bed.  HEENT: normocephalic, nontraumatic, PERRLA, EOMI  Cardiovascular: Heart RRR with no murmurs, rubs or gallops. Distal Pulses 2+  Respiratory: symmetric chest expansion, lungs CTA bilaterally with no wheezes rales or rhonci  Abdomen: gravid, nontender  Musculoskeletal: strength 5/5 in four extremities  Neuro: non focal with no numbness, tingling or changes in sensation    SVE: 1/thick%/-3  [place]: Q0 minutes;   EFM:  Baseline 135   moderate Variability   Decels None*    A/P: Intrauterine pregancy at 37w0d weeks in latent labor here for IOL for gHTN.      Patient Active Problem List    Diagnosis Date Noted   • Supervision of normal first pregnancy, antepartum 2021   • Obesity affecting pregnancy in third trimester 2021   • BMI (body mass index), pediatric 95-99% for age, obese child structured weight management/multidisciplinary intervention category 2016   • Acanthosis nigricans 2016       1. IUP at term  2. Patient is GBS Neg  3. Anticipating   4. Start cytotec     Jason Bailey M.D.        "

## 2021-09-14 ENCOUNTER — ANESTHESIA EVENT (OUTPATIENT)
Dept: ANESTHESIOLOGY | Facility: MEDICAL CENTER | Age: 21
End: 2021-09-14
Payer: COMMERCIAL

## 2021-09-14 ENCOUNTER — ANESTHESIA (OUTPATIENT)
Dept: ANESTHESIOLOGY | Facility: MEDICAL CENTER | Age: 21
End: 2021-09-14
Payer: COMMERCIAL

## 2021-09-14 LAB
ERYTHROCYTE [DISTWIDTH] IN BLOOD BY AUTOMATED COUNT: 46.5 FL (ref 35.9–50)
HCT VFR BLD AUTO: 37.5 % (ref 37–47)
HGB BLD-MCNC: 12.3 G/DL (ref 12–16)
MCH RBC QN AUTO: 30.2 PG (ref 27–33)
MCHC RBC AUTO-ENTMCNC: 32.8 G/DL (ref 33.6–35)
MCV RBC AUTO: 92.1 FL (ref 81.4–97.8)
PLATELET # BLD AUTO: 216 K/UL (ref 164–446)
PMV BLD AUTO: 12 FL (ref 9–12.9)
RBC # BLD AUTO: 4.07 M/UL (ref 4.2–5.4)
WBC # BLD AUTO: 23.1 K/UL (ref 4.8–10.8)

## 2021-09-14 PROCEDURE — 700101 HCHG RX REV CODE 250: Performed by: ANESTHESIOLOGY

## 2021-09-14 PROCEDURE — 700111 HCHG RX REV CODE 636 W/ 250 OVERRIDE (IP)

## 2021-09-14 PROCEDURE — 700111 HCHG RX REV CODE 636 W/ 250 OVERRIDE (IP): Performed by: ADVANCED PRACTICE MIDWIFE

## 2021-09-14 PROCEDURE — 303615 HCHG EPIDURAL/SPINAL ANESTHESIA FOR LABOR

## 2021-09-14 PROCEDURE — 85027 COMPLETE CBC AUTOMATED: CPT

## 2021-09-14 PROCEDURE — 36415 COLL VENOUS BLD VENIPUNCTURE: CPT

## 2021-09-14 PROCEDURE — A9270 NON-COVERED ITEM OR SERVICE: HCPCS | Performed by: FAMILY MEDICINE

## 2021-09-14 PROCEDURE — 700111 HCHG RX REV CODE 636 W/ 250 OVERRIDE (IP): Performed by: ANESTHESIOLOGY

## 2021-09-14 PROCEDURE — 99999 PR NO CHARGE: CPT | Performed by: OBSTETRICS & GYNECOLOGY

## 2021-09-14 PROCEDURE — 59409 OBSTETRICAL CARE: CPT

## 2021-09-14 PROCEDURE — 700111 HCHG RX REV CODE 636 W/ 250 OVERRIDE (IP): Performed by: FAMILY MEDICINE

## 2021-09-14 PROCEDURE — 304965 HCHG RECOVERY SERVICES

## 2021-09-14 PROCEDURE — 59400 OBSTETRICAL CARE: CPT | Performed by: ADVANCED PRACTICE MIDWIFE

## 2021-09-14 PROCEDURE — 700102 HCHG RX REV CODE 250 W/ 637 OVERRIDE(OP): Performed by: FAMILY MEDICINE

## 2021-09-14 PROCEDURE — 0UQGXZZ REPAIR VAGINA, EXTERNAL APPROACH: ICD-10-PCS | Performed by: OBSTETRICS & GYNECOLOGY

## 2021-09-14 PROCEDURE — 770002 HCHG ROOM/CARE - OB PRIVATE (112)

## 2021-09-14 RX ORDER — SODIUM CHLORIDE, SODIUM LACTATE, POTASSIUM CHLORIDE, AND CALCIUM CHLORIDE .6; .31; .03; .02 G/100ML; G/100ML; G/100ML; G/100ML
1000 INJECTION, SOLUTION INTRAVENOUS
Status: DISCONTINUED | OUTPATIENT
Start: 2021-09-14 | End: 2021-09-14

## 2021-09-14 RX ORDER — ROPIVACAINE HYDROCHLORIDE 2 MG/ML
INJECTION, SOLUTION EPIDURAL; INFILTRATION; PERINEURAL CONTINUOUS
Status: DISCONTINUED | OUTPATIENT
Start: 2021-09-14 | End: 2021-09-14

## 2021-09-14 RX ORDER — BUPIVACAINE HYDROCHLORIDE 2.5 MG/ML
INJECTION, SOLUTION EPIDURAL; INFILTRATION; INTRACAUDAL
Status: COMPLETED
Start: 2021-09-14 | End: 2021-09-14

## 2021-09-14 RX ORDER — DOCUSATE SODIUM 100 MG/1
100 CAPSULE, LIQUID FILLED ORAL 2 TIMES DAILY PRN
Status: DISCONTINUED | OUTPATIENT
Start: 2021-09-14 | End: 2021-09-16 | Stop reason: HOSPADM

## 2021-09-14 RX ORDER — IBUPROFEN 800 MG/1
800 TABLET ORAL EVERY 8 HOURS PRN
Status: DISCONTINUED | OUTPATIENT
Start: 2021-09-14 | End: 2021-09-16 | Stop reason: HOSPADM

## 2021-09-14 RX ORDER — BISACODYL 10 MG
10 SUPPOSITORY, RECTAL RECTAL PRN
Status: DISCONTINUED | OUTPATIENT
Start: 2021-09-14 | End: 2021-09-16 | Stop reason: HOSPADM

## 2021-09-14 RX ORDER — LIDOCAINE HYDROCHLORIDE AND EPINEPHRINE 15; 5 MG/ML; UG/ML
INJECTION, SOLUTION EPIDURAL
Status: COMPLETED | OUTPATIENT
Start: 2021-09-14 | End: 2021-09-14

## 2021-09-14 RX ORDER — BUPIVACAINE HYDROCHLORIDE 2.5 MG/ML
INJECTION, SOLUTION EPIDURAL; INFILTRATION; INTRACAUDAL
Status: COMPLETED | OUTPATIENT
Start: 2021-09-14 | End: 2021-09-14

## 2021-09-14 RX ORDER — SODIUM CHLORIDE, SODIUM LACTATE, POTASSIUM CHLORIDE, AND CALCIUM CHLORIDE .6; .31; .03; .02 G/100ML; G/100ML; G/100ML; G/100ML
250 INJECTION, SOLUTION INTRAVENOUS PRN
Status: DISCONTINUED | OUTPATIENT
Start: 2021-09-14 | End: 2021-09-14

## 2021-09-14 RX ORDER — ACETAMINOPHEN 500 MG
1000 TABLET ORAL EVERY 6 HOURS PRN
Status: DISCONTINUED | OUTPATIENT
Start: 2021-09-14 | End: 2021-09-16 | Stop reason: HOSPADM

## 2021-09-14 RX ORDER — SODIUM CHLORIDE, SODIUM LACTATE, POTASSIUM CHLORIDE, CALCIUM CHLORIDE 600; 310; 30; 20 MG/100ML; MG/100ML; MG/100ML; MG/100ML
INJECTION, SOLUTION INTRAVENOUS PRN
Status: DISCONTINUED | OUTPATIENT
Start: 2021-09-14 | End: 2021-09-16

## 2021-09-14 RX ORDER — ONDANSETRON 2 MG/ML
4 INJECTION INTRAMUSCULAR; INTRAVENOUS EVERY 4 HOURS PRN
Status: DISCONTINUED | OUTPATIENT
Start: 2021-09-14 | End: 2021-09-16 | Stop reason: HOSPADM

## 2021-09-14 RX ORDER — ROPIVACAINE HYDROCHLORIDE 2 MG/ML
INJECTION, SOLUTION EPIDURAL; INFILTRATION; PERINEURAL
Status: COMPLETED
Start: 2021-09-14 | End: 2021-09-14

## 2021-09-14 RX ADMIN — OXYTOCIN 125 ML/HR: 10 INJECTION, SOLUTION INTRAMUSCULAR; INTRAVENOUS at 06:56

## 2021-09-14 RX ADMIN — ONDANSETRON 4 MG: 2 INJECTION INTRAMUSCULAR; INTRAVENOUS at 07:00

## 2021-09-14 RX ADMIN — ROPIVACAINE HYDROCHLORIDE 200 MG: 2 INJECTION, SOLUTION EPIDURAL; INFILTRATION at 02:20

## 2021-09-14 RX ADMIN — OXYTOCIN 2000 ML/HR: 10 INJECTION, SOLUTION INTRAMUSCULAR; INTRAVENOUS at 05:30

## 2021-09-14 RX ADMIN — ACETAMINOPHEN 650 MG: 325 TABLET, FILM COATED ORAL at 15:42

## 2021-09-14 RX ADMIN — FENTANYL CITRATE 100 MCG: 50 INJECTION, SOLUTION INTRAMUSCULAR; INTRAVENOUS at 02:15

## 2021-09-14 RX ADMIN — BUPIVACAINE HYDROCHLORIDE 8 ML: 2.5 INJECTION, SOLUTION EPIDURAL; INFILTRATION; INTRACAUDAL; PERINEURAL at 02:15

## 2021-09-14 RX ADMIN — IBUPROFEN 600 MG: 600 TABLET ORAL at 20:39

## 2021-09-14 RX ADMIN — ACETAMINOPHEN 650 MG: 325 TABLET, FILM COATED ORAL at 06:59

## 2021-09-14 RX ADMIN — OXYTOCIN 1 MILLI-UNITS/MIN: 10 INJECTION, SOLUTION INTRAMUSCULAR; INTRAVENOUS at 03:23

## 2021-09-14 RX ADMIN — LIDOCAINE HYDROCHLORIDE,EPINEPHRINE BITARTRATE 5 ML: 15; .005 INJECTION, SOLUTION EPIDURAL; INFILTRATION; INTRACAUDAL; PERINEURAL at 02:15

## 2021-09-14 RX ADMIN — IBUPROFEN 600 MG: 600 TABLET ORAL at 10:18

## 2021-09-14 ASSESSMENT — PAIN DESCRIPTION - PAIN TYPE
TYPE: ACUTE PAIN
TYPE: ACUTE PAIN

## 2021-09-14 ASSESSMENT — PAIN SCALES - GENERAL: PAIN_LEVEL: 0

## 2021-09-14 NOTE — ANESTHESIA PREPROCEDURE EVALUATION
19yo  at 37 1/7 weeks, here in active labor requesting epidural for pain relief    Relevant Problems   Other   (positive) Acanthosis nigricans   (positive) Obesity affecting pregnancy in third trimester       Physical Exam    Airway   Mallampati: III  TM distance: >3 FB  Neck ROM: full       Cardiovascular - normal exam  Rhythm: regular  Rate: normal  (-) murmur     Dental - normal exam           Pulmonary - normal exam  Breath sounds clear to auscultation     Abdominal    Neurological - normal exam                 Anesthesia Plan    ASA 3   ASA physical status 3 criteria: morbid obesity - BMI greater than or equal to 40    Plan - epidural   Neuraxial block will be labor analgesia                  Pertinent diagnostic labs and testing reviewed    Informed Consent:    Anesthetic plan and risks discussed with patient.

## 2021-09-14 NOTE — ANESTHESIA PROCEDURE NOTES
Epidural Block    Date/Time: 9/14/2021 2:15 AM  Performed by: Leigh Ann Ventura M.D.  Authorized by: Leigh Ann Ventura M.D.     Patient Location:  OB  Start Time:  9/14/2021 2:15 AM  End Time:  9/14/2021 2:23 AM  Reason for Block: labor analgesia    patient identified, IV checked, site marked, risks and benefits discussed, surgical consent, monitors and equipment checked, pre-op evaluation and timeout performed    Patient Position:  Sitting  Prep: ChloraPrep, patient draped and sterile technique    Monitoring:  Blood pressure, continuous pulse oximetry and heart rate  Approach:  Midline  Location:  L3-L4  Injection Technique:  BECKA air and BECKA saline  Skin infiltration:  Lidocaine  Strength:  1%  Dose:  3ml  Needle Type:  Tuohy  Needle Gauge:  17 G  Needle Length:  3.5 in  Loss of resistance::  6.5  Catheter Size:  19 G  Catheter at Skin Depth:  14  Test Dose Result:  Negative

## 2021-09-14 NOTE — NON-PROVIDER
S: Pt is in bed breathing and moving through contractions, in considerable discomfort.  Per RN FHT have been difficult to trace and internal monitors are requested. RN exam -2.     O:    Vitals:    21 1300 21 1618 21 1900 21 0000   BP: 128/58 127/67 116/58 131/56   Pulse: (!) 57 60 62 60   Resp: 18 18     Temp: 36.7 °C (98 °F) 36.7 °C (98 °F) 36.1 °C (97 °F) 36.2 °C (97.2 °F)   TempSrc: Temporal Temporal Temporal Temporal   SpO2:       Weight:       Height:               FHTs:  Baseline 155, accels absent, early and variable decels, moderate variability        [place]: Contractions q 3-5 minutes, moderate to palpation, pitocin @ 3 milliunits        SVE: /-2    IUPC and FSE placed without difficulty, patient tolerated procedure well.      A/P:    1.  IUP @ 37w1d   2.  Cat 2 FHTs    3.  GBS negative  4.  Await     JOSE ROBERTO Bruce, NANCY

## 2021-09-14 NOTE — L&D DELIVERY NOTE
Admit Date:   2021      Pregnancy Complications: gestational hypertension  Medical Induction of Labor: yes  GBS: negative  Anesthesia: epidural  Gestational Age at delivery: 37w1d    Patient progressed well with Cook catheter and pitocin augmentation to deliver viable female infant in OPHELIA position at 0529 with coached pushing efforts. No nuchal was noted. Infant placed to maternal abdomen where she was dried and stimulated. A spontaneous cry was noted. Apgar 8, 9      Pitocin infused via IV bolus. After delayed cord clamping, cord was doubly clamped and cut by father. Signs of placenta separation noted and gentle cord traction was completed. Intact placenta was delivered spontaneously at 0537 where 3VC was noted. EBL 150ml. Fundus firm with minimal massage. Inspection of vulva and vagina was completed and vaginal laceration that was noted. This was repaired in usual fashion with 3-0 Vicryl and excellent hemostasis and approximation achieved. Multiple periurethral abrasions were also note but did not require repair. Routine postpartum care was initiated as mother and infant stable. Infant weight is pending        Kev CID CNM/ Dr. Winston, Attending

## 2021-09-14 NOTE — LACTATION NOTE
This note was copied from a baby's chart.  20yr, , #7wk1d, IOL for gestational HTN    Initial lactation consultation:  Mom and baby skin to skin.  Baby asleep.  MOB reports that baby has  a few times but falls asleep quickly.  Assisted with proper positioning and latch in football hold to right breast.  Baby latched and started sucking well, swallowing noted but then fell asleep after a few sucks.      Mom encouraged to keep baby skin to skin and see if she will start rooting and feeding over the next 15-30 minutes.  LC gloria return and spoon feed in unable to get a successful feeding at breast.  MOB denies nipple pain and breast pain. No breakdown observed.  Educated mom on importance of not having pain when baby is breastfeeding and to re latch..    Breastfeeding education provided including what to expect over these next few days with frequency and cluster feeding, importance of not letting baby go longer than 4 hours between feedings, importance of skin to skin with feeding to help promote feeding interest and to help have more successful and sustained feedings, to avoid pacifiers during the first month of life or until milk supply and feeding is well established, and to feed baby with cues and at least 8-10 times every 24 hours and to make sure baby has increasing voids and stools each day of life,

## 2021-09-14 NOTE — ANESTHESIA POSTPROCEDURE EVALUATION
Patient: Chen Gupta    Procedure Summary     Date: 09/14/21 Room / Location:     Anesthesia Start: 0210 Anesthesia Stop: 0529    Procedure: Labor Epidural Diagnosis:     Scheduled Providers:  Responsible Provider: Leigh Ann Ventura M.D.    Anesthesia Type: epidural ASA Status: 3          Final Anesthesia Type: epidural  Last vitals  BP   Blood Pressure: 125/58    Temp   36.2 °C (97.2 °F)    Pulse   73   Resp   18    SpO2   96 %      Anesthesia Post Evaluation    Patient location during evaluation: PACU  Patient participation: complete - patient participated  Level of consciousness: awake and alert  Pain score: 0    Airway patency: patent  Anesthetic complications: no  Cardiovascular status: hemodynamically stable  Respiratory status: acceptable  Hydration status: euvolemic    PONV: none          No complications documented.

## 2021-09-14 NOTE — NON-PROVIDER
S: Pt is lying comfortably in bed.  Feeling contractions but coping well.  Reports Cooks balloon came out in toilet. Per RN check cervix is /-2. Discuss with patient AROM is appropriate next step in induction of labor. Risks/benefits discussed and patient consents to AROM being performed.    O:    Vitals:    21 1300 21 1618 21 1900 21 0000   BP: 128/58 127/67 116/58 131/56   Pulse: (!) 57 60 62 60   Resp: 18 18     Temp: 36.7 °C (98 °F) 36.7 °C (98 °F) 36.1 °C (97 °F) 36.2 °C (97.2 °F)   TempSrc: Temporal Temporal Temporal Temporal   SpO2:       Weight:       Height:               FHTs:  Baseline 150, present accels, no decels, moderate variability        [place]: Contractions q 2-3 minutes, moderate to palpation, pitocin @ 3 milliunits        SVE: 70/-2    AROM performed without difficulty.  Patient tolerated procedure well.      A/P:    1.  IUP @ 37w1d   2.  Cat 1 FHTs    3.  GBS negative  4.  Await     JOSE ROBERTO Bruce, BUDM

## 2021-09-14 NOTE — PROGRESS NOTES
Report received from CINDY Bates. Assuming care. Pt resting comfortably in bed, no needs at this time.      Pitocin started, see MAR.    0529  of a viable female infant, APGARS 8/9.    07 Report given to CINDY Bates.

## 2021-09-14 NOTE — LACTATION NOTE
This note was copied from a baby's chart.  LC follow up check on how baby is feeding:    Baby bundle wrapped in grandmas arms.  Reviewed with mom the need to keep baby skin to skin to promote feeding since baby has been sleepy since birth and no reported good breast feeding attempts.     Baby unwrapped and placed skin to skin and hunger cues noted, rooting and opening mouth.  MOB shown what to look for with cues and reviewed importance to feed baby when she is showing these cues. Baby latched and sucked well and continuously for 5 minutes.  Tried on right side and baby latched and sucked well for 1-2 minutes and then asleep.    Plan:  Mom to keep baby skin to skin while awake and feed with cues, call nurse if baby remains sleepy and goes longer than 4 hours between feeds.

## 2021-09-14 NOTE — ANESTHESIA TIME REPORT
Anesthesia Start and Stop Event Times     Date Time Event    9/14/2021 0210 Anesthesia Start     0529 Anesthesia Stop        Responsible Staff  09/14/21    Name Role Begin End    Leigh Ann Ventura M.D. Anesth 0210 0529        Preop Diagnosis (Free Text):  Pre-op Diagnosis             Preop Diagnosis (Codes):    Post op Diagnosis  Pain during labor      Premium Reason  A. 3PM - 7AM    Comments:

## 2021-09-14 NOTE — PROGRESS NOTES
Patient arrived to [address] with infant, SO, and belongings. Received report from CINDY Bates. Discussed POC and oriented patient to call light, emergency cords, room and unit policies. Patient verbalizes understanding.

## 2021-09-14 NOTE — CARE PLAN
Problem: Risk for Infection and Impaired Wound Healing  Goal: Patient will remain free from infection  Outcome: Progressing  Note: RN will assess vital signs including temperature for s/s of infection. Pt remains afebrile.      Problem: Pain  Goal: Patient's pain will be alleviated or reduced to the patient’s comfort goal  Outcome: Progressing  Note: Pt undecided about pain management. RN to provide education on both pharmacological and nonpharmacological interventions. Epidural placed. Pt tolerated well.

## 2021-09-15 PROCEDURE — A9270 NON-COVERED ITEM OR SERVICE: HCPCS | Performed by: FAMILY MEDICINE

## 2021-09-15 PROCEDURE — 700102 HCHG RX REV CODE 250 W/ 637 OVERRIDE(OP): Performed by: FAMILY MEDICINE

## 2021-09-15 PROCEDURE — 700102 HCHG RX REV CODE 250 W/ 637 OVERRIDE(OP): Performed by: OBSTETRICS & GYNECOLOGY

## 2021-09-15 PROCEDURE — A9270 NON-COVERED ITEM OR SERVICE: HCPCS | Performed by: OBSTETRICS & GYNECOLOGY

## 2021-09-15 PROCEDURE — 770002 HCHG ROOM/CARE - OB PRIVATE (112)

## 2021-09-15 RX ADMIN — ACETAMINOPHEN 650 MG: 325 TABLET, FILM COATED ORAL at 12:27

## 2021-09-15 RX ADMIN — IBUPROFEN 600 MG: 600 TABLET ORAL at 19:29

## 2021-09-15 RX ADMIN — DOCUSATE SODIUM 100 MG: 100 CAPSULE, LIQUID FILLED ORAL at 23:30

## 2021-09-15 ASSESSMENT — PAIN DESCRIPTION - PAIN TYPE
TYPE: ACUTE PAIN

## 2021-09-15 NOTE — PROGRESS NOTES
2005 Assessment complete. VSS. Fundus firm, lochia light. Pain controlled with PRN medications per MAR. Pt will call to request pain medications. FOB at bedside bonding with pt and baby. States voiding without difficulty. POC discussed. Encouraged to call with need. Call light in place.

## 2021-09-15 NOTE — LACTATION NOTE
This note was copied from a baby's chart.  Lactation follow up visit:    MOB states that baby started breastfeeding better last night and last feeding was approx 3 hours ago and stayed on for 20 minutes.  Baby in open crib and waking.  Offered to assist with latching baby.  MOB happy to accept assistance.  Baby placed skin to skin and positioned tummy to tummy.  MOB independently positioned baby well and attempted to latch with nipple to nose but baby asleep.  Mom encouraged to keep baby STS and will attempt to assist when she starts showing cues.  Mom denies nipple or breast pain at this time.  Lanolin provided.  Denies any questions or concerns at this time

## 2021-09-15 NOTE — PROGRESS NOTES
Obstetrics & Gynecology Post-Delivery Progress Note    Date of Service      20 y.o.  s/p vaginal delivery day #1  Delivery date: 2021      Subjective  Pt reports she is experiencing abdominal cramping only when she is breastfeeding   Pain: well controlled   Bleeding: lochia. Moderate   Tolerating PO: yes  Voiding: yes  Ambulating: yes  Passing flatus: yes  Feeding: breastfeeding       Objective  24hr VS:  Temp:  [36.2 °C (97.2 °F)-36.9 °C (98.5 °F)] 36.3 °C (97.3 °F)  Pulse:  [61-73] 61  Resp:  [16-18] 18  BP: (111-131)/(44-67) 121/65  SpO2:  [97 %-98 %] 97 %    Physical Exam  Gen: well appearing, in no acute distress  CV: S1, S2 present, no rubs, gallops or murmurs  Resp: CTAB  Abd:Fundus: non tender, below the level of the umbilicus   Ext: non edematous, calves nontender    Labs:  Recent Labs     21  1010 21  1343   WBC 14.2* 23.1*   RBC 4.55 4.07*   HEMOGLOBIN 13.8 12.3   HEMATOCRIT 43.1 37.5   MCV 94.7 92.1   MCH 30.3 30.2   RDW 47.1 46.5   PLATELETCT 243 216   MPV 11.8 12.0   NEUTSPOLYS 76.20*  --    LYMPHOCYTES 13.90*  --    MONOCYTES 6.30  --    EOSINOPHILS 0.70  --    BASOPHILS 0.60  --        Medications     PRN medications: ondansetron, LR, docusate sodium, bisacodyl, acetaminophen, ibuprofen, miSOPROStol, ibuprofen, acetaminophen, acetaminophen, HYDROcodone-acetaminophen      Assessment/Plan  Chen Gupta is a 20 y.o.yo  postpartum day #1  s/p vaginal delivery     - Post care: post delivery care and management   - Pain: well controlled   - Rh positive   - Method of Feeding: breastfeeding   - Method of Contraception: would like to discuss at outpatient setting     - Disposition: likely home tomorrow     Yamile Fay M.D.PhD

## 2021-09-15 NOTE — CARE PLAN
The patient is Stable - Low risk of patient condition declining or worsening    Shift Goals  Clinical Goals: breastfeeding    Problem: Psychosocial - Postpartum  Goal: Patient will verbalize and demonstrate effective bonding and parenting behavior  Outcome: Progressing     Problem: Altered Physiologic Condition  Goal: Patient physiologically stable as evidenced by normal lochia, palpable uterine involution and vitals within normal limits  Outcome: Progressing  Note: VSS. Fundus firm and lochia light.

## 2021-09-15 NOTE — CARE PLAN
The patient is Stable - Low risk of patient condition declining or worsening    Shift Goals  Clinical Goals: breastfeed     Progress made toward(s) clinical / shift goals:  Patient is breastfeeding independently.     Patient is not progressing towards the following goals:

## 2021-09-16 ENCOUNTER — PHARMACY VISIT (OUTPATIENT)
Dept: PHARMACY | Facility: MEDICAL CENTER | Age: 21
End: 2021-09-16
Payer: COMMERCIAL

## 2021-09-16 VITALS
HEIGHT: 63 IN | HEART RATE: 62 BPM | BODY MASS INDEX: 45 KG/M2 | TEMPERATURE: 97.1 F | OXYGEN SATURATION: 96 % | WEIGHT: 254 LBS | SYSTOLIC BLOOD PRESSURE: 113 MMHG | RESPIRATION RATE: 16 BRPM | DIASTOLIC BLOOD PRESSURE: 73 MMHG

## 2021-09-16 PROCEDURE — A9270 NON-COVERED ITEM OR SERVICE: HCPCS | Performed by: FAMILY MEDICINE

## 2021-09-16 PROCEDURE — RXMED WILLOW AMBULATORY MEDICATION CHARGE

## 2021-09-16 PROCEDURE — 700102 HCHG RX REV CODE 250 W/ 637 OVERRIDE(OP): Performed by: FAMILY MEDICINE

## 2021-09-16 RX ORDER — ACETAMINOPHEN 500 MG
1000 TABLET ORAL EVERY 6 HOURS PRN
Qty: 30 TABLET | Refills: 0 | Status: SHIPPED | OUTPATIENT
Start: 2021-09-16 | End: 2022-03-01

## 2021-09-16 RX ORDER — IBUPROFEN 800 MG/1
800 TABLET ORAL EVERY 8 HOURS PRN
Qty: 30 TABLET | Refills: 0 | Status: SHIPPED | OUTPATIENT
Start: 2021-09-16 | End: 2022-03-01

## 2021-09-16 RX ADMIN — IBUPROFEN 600 MG: 600 TABLET ORAL at 08:29

## 2021-09-16 ASSESSMENT — EDINBURGH POSTNATAL DEPRESSION SCALE (EPDS)
I HAVE BLAMED MYSELF UNNECESSARILY WHEN THINGS WENT WRONG: NO, NEVER
I HAVE BEEN ANXIOUS OR WORRIED FOR NO GOOD REASON: HARDLY EVER
I HAVE BEEN SO UNHAPPY THAT I HAVE HAD DIFFICULTY SLEEPING: NOT AT ALL
I HAVE BEEN SO UNHAPPY THAT I HAVE BEEN CRYING: ONLY OCCASIONALLY
I HAVE BEEN ABLE TO LAUGH AND SEE THE FUNNY SIDE OF THINGS: AS MUCH AS I ALWAYS COULD
THE THOUGHT OF HARMING MYSELF HAS OCCURRED TO ME: NEVER
THINGS HAVE BEEN GETTING ON TOP OF ME: NO, I HAVE BEEN COPING AS WELL AS EVER
I HAVE FELT SCARED OR PANICKY FOR NO GOOD REASON: NO, NOT AT ALL
I HAVE LOOKED FORWARD WITH ENJOYMENT TO THINGS: AS MUCH AS I EVER DID
I HAVE FELT SAD OR MISERABLE: NO, NOT AT ALL

## 2021-09-16 ASSESSMENT — PAIN DESCRIPTION - PAIN TYPE: TYPE: ACUTE PAIN

## 2021-09-16 NOTE — PROGRESS NOTES
1920 Assessment complete. VSS. Fundus firm, lochia light. Pain controlled with PRN medications per MAR. Pt will call to request pain medications. FOB at bedside bonding with pt and baby. States voiding without difficulty. POC discussed. Encouraged to call with need. Call light in place.

## 2021-09-16 NOTE — LACTATION NOTE
This note was copied from a baby's chart.  Mom is a 19 y/o P1 who delivered baby girl weighing 6 # 13.1 oz at 37.1 wks. Mom reports darker and enlarged areolas during pregnancy. Mom denies any breast surgeries, diabetes, thyroid or fertility issues.  Mom states baby is feeding OK, hears swallows and she feeds from 5 minutes to 20/30 minutes with cluster feeding last night.   Mom is preparing for discharge this morning.   - Reviewed demand feed baby 8 or more times in 24 hours. - Be aware that periods of cluster feeding are normal. Note rhythmic, effective jaw glide.  - Listen for swallowing while nursing.  - Observe voids and stools and color changes to stools. Mom states he does sees some green in baby's stool now.  - Remain skin to skin during waking and observe for early feeding cues of lciking lips , rooting towards breast and placing hands to mouth.  - View [place] [employer] video website. Hand express onto nipple before and after feedings. If baby not latching, hand express into spoon and feed back. Mom may hand express after pumping to collect more colostrum.    Begin supplementing and pumping for suboptimals feeds, low output, not content between feeds, jaundice, and or lethargy. Call peds for any of these concerns and follow up immediately in the office.  Mom has an appoinmnet with [facility] on Tues 9/21 and will inquire about a breast pump. LC will order a manual pump in the meantime.

## 2021-09-16 NOTE — DISCHARGE INSTRUCTIONS
PATIENT DISCHARGE EDUCATION INSTRUCTION SHEET  REASONS TO CALL YOUR OBSTETRICIAN  · Persistent fever, shaking, chills (Temperature higher than 100.4) may indicate you have an infection  · Heavy bleeding: soaking more than 1 pad per hour; Passing clots an egg-sized clot or bigger may mean you have an postpartum hemorrhage  · Foul odor from vagina or bad smelling or discolored discharge or blood  · Breast infection (Mastitis symptoms); breast pain, chills, fever, redness or red streaks, may feel flu like symptoms  · Urinary pain, burning or frequency  · Incision that is not healing, increased redness, swelling, tenderness or pain, or any pus from episiotomy or  site may mean you have an infection  · Redness, swelling, warmth, or painful to touch in the calf area of your leg may mean you have a blood clot  · Severe or intensified depression, thoughts or feelings of wanting to hurt yourself or someone else   · Pain in chest, obstructed breathing or shortness of breath (trouble catching your breath) may mean you are having a postpartum complication. Call your provider immediately   · Headache that does not get better, even after taking medicine, a bad headache with vision changes or pain in the upper right area of your belly may mean you have high blood pressure or post birth preeclampsia. Call your provider immediately    HAND WASHING  All family and friends should wash their hands:  · Before and after holding the baby  · Before feeding the baby  · After using the restroom or changing the baby's diaper    WOUND CARE  Ask your physician for additional care instructions. In general:  ·  Incision:  · May shower and pat incision dry   · Keep the incision clean and dry  · There should not be any opening or pus from the incision  · Continue to walk at home 3 times a day   · Do NOT lift anything heavier than your baby (over 10 pounds)  · Encourage family to help participate in care of the  to allow  rest and mom time to heal  · Episiotomy/Laceration  · May use susan-spray bottle, witch hazel pads and dermaplast spray for comfort  · Use susan-spray bottle after urinating to cleanse perineal area  · To prevent burning during urination spray susan-water bottle on labial area   · Pat perineal area dry until episiotomy/laceration is healed  · Continue to use susan-bottle until bleeding stops as needed  · If have a 2nd degree laceration or greater, a Sitz bath can offer relief from soreness, burning, and inflammation   · Sitz Bath   · Sit in 6 inches of warm water and soak laceration as needed until the laceration heals    VAGINAL CARE AND BLEEDING  · Nothing inside vagina for 6 weeks:   · No sexual intercourse, tampons or douching  · Bleeding may continue for 2-4 weeks. Amount and color may vary  · Soaking 1 pad or more in an hour for several hours is considered heavy bleeding  · Passing large egg sized blood clots can be concerning  · If you feel like you have heavy bleeding or are having increasing amount of blood clots call your Obstetrician immediately  · If you begin feeling faint upon standing, feeling sick to your stomach, have clammy skin, a really fast heartbeat, have chills, start feeling confused, dizzy, sleepy or weak, or feeling like you're going to faint call your Obstetrician immediately    HYPERTENSION   Preeclampsia or gestational hypertension are types of high blood pressure that only pregnant women can get. It is important for you to be aware of symptoms to seek early intervention and treatment. If you have any of these symptoms immediately call your Obstetrician    · Vision changes or blurred vision   · Severe headache or pain that is unrelieved with medication and will not go away  · Persistent pain in upper abdomen or shoulder   · Increased swelling of face, feet, or hands  · Difficulty breathing or shortness of breath at rest  · Urinating less than usual    URINATION AND BOWEL MOVEMENTS  · Eating  "more fiber (bran cereal, fruits, and vegetables) and drinking plenty of fluids will help to avoid constipation  · Urinary frequency and urgency after childbirth is normal  · If you experience any urinary pain, burning or frequency call your provider    BIRTH CONTROL  · It is possible to become pregnant at any time after delivery and while breastfeeding  · Plan to discuss a method of birth control with your physician at your post delivery follow up visit    POSTPARTUM BLUES  During the first few days after birth, you may experience a sense of the \"blues\" which may include impatience, irritability or even crying. These feelings come and go quickly. However, as many as 1 in 10 women experience emotional symptoms known as postpartum depression.     POSTPARTUM DEPRESSION    May start as early as the second or third day after delivery or take several weeks or months to develop. Symptoms of \"blues\" are present, but are more intense: Crying spells; loss of appetite; feelings of hopelessness or loss of control; fear of touching the baby; over concern or no concern at all about the baby; little or no concern about your own appearance/caring for yourself; and/or inability to sleep or excessive sleeping. Contact your Obstetrician if you are experiencing any of these symptoms     PREVENTING SHAKEN BABY  If you are angry or stressed, PUT THE BABY IN THE CRIB, step away, take some deep breaths, and wait until you are calm to care for the baby. DO NOT SHAKE THE BABY. You are not alone, call a supporter for help.  · [facility] 24/7 crisis call line (265-636-3720) or (1-595.309.1878)  · You can also text them, text \"ANSWER\" (043902)      "

## 2021-09-16 NOTE — DISCHARGE SUMMARY
Discharge Summary:     Date of Admission: 2021  Date of Discharge: 21      Admitting diagnosis:    1. Pregnancy @ 37w1d      Discharge Diagnosis:   1. Status post vaginal delivery  2.  Gestational hypertension    No past medical history on file.  OB History    Para Term  AB Living   1 1 1 0 0 1   SAB TAB Ectopic Molar Multiple Live Births   0 0 0 0 0 1      # Outcome Date GA Lbr Power/2nd Weight Sex Delivery Anes PTL Lv   1 Term 20 37w1d 06:29 / 00:35 2.81 kg (6 lb 3.1 oz) F Vag-Spont EPI N JAMES     No past surgical history on file.  Patient has no known allergies.    Patient Active Problem List   Diagnosis   • BMI (body mass index), pediatric 95-99% for age, obese child structured weight management/multidisciplinary intervention category   • Acanthosis nigricans   • Obesity affecting pregnancy in third trimester   • Supervision of normal first pregnancy, antepartum       Hospital Course:   Pt is a 20 y.o. now  who presented for induction of labor secondary to gestational hypertension. Patient underwent vaginal delivery. pt gave birth to baby girl with APGARs of 8/9 and weight 2.81 kg (6 lb 3.1 oz).  Patient's postpartum course was unremarkable, with progressive advancement in diet , ambulation and toleration of oral analgesia. Patient without complaints today and desires discharge.  For postpartum contraception, pt desires to discuss at outpatient setting.      Physical Exam:  Temp:  [36.2 °C (97.1 °F)-36.4 °C (97.5 °F)] 36.2 °C (97.1 °F)  Pulse:  [62-64] 62  Resp:  [16] 16  BP: (113-132)/(63-73) 113/73  SpO2:  [96 %-98 %] 96 %  Physical Exam  General: Well-appearing, no acute distress  Chest/Breasts: No visible trauma  Abdomen:   Fundus: Nontender below the level of the umbilicus  Extremities: Nontender, calves nontender    Current Facility-Administered Medications   Medication Dose   • ondansetron (ZOFRAN) syringe/vial injection 4 mg  4 mg   • LR infusion     • docusate sodium  (COLACE) capsule 100 mg  100 mg   • bisacodyl (DULCOLAX) suppository 10 mg  10 mg   • acetaminophen (TYLENOL) tablet 1,000 mg  1,000 mg   • ibuprofen (MOTRIN) tablet 800 mg  800 mg   • miSOPROStol (CYTOTEC) tablet 25 mcg  25 mcg   • oxytocin (PITOCIN) infusion (for postpartum)   mL/hr   • ibuprofen (MOTRIN) tablet 600 mg  600 mg   • acetaminophen (Tylenol) tablet 325 mg  325 mg   • acetaminophen (Tylenol) tablet 650 mg  650 mg   • HYDROcodone-acetaminophen (NORCO) 5-325 MG per tablet 2 Tablet  2 Tablet   • oxytocin (PITOCIN) 20 UNITS/1000ML LR (induction of labor)  0.5-20 yanni-units/min       Recent Labs     09/13/21  1010 09/14/21  1343   WBC 14.2* 23.1*   RBC 4.55 4.07*   HEMOGLOBIN 13.8 12.3   HEMATOCRIT 43.1 37.5   MCV 94.7 92.1   MCH 30.3 30.2   MCHC 32.0* 32.8*   RDW 47.1 46.5   PLATELETCT 243 216   MPV 11.8 12.0         Activity/ Discharge Instructions::   Discharge to home  Pelvic Rest x 6 weeks  No heavy lifting x4 weeks  Call or come to ED for: heavy vaginal bleeding, fever >100.4, severe abdominal pain, severe headache, chest pain, shortness of breath,  N/V, incisional drainage, or other concerns.       Follow up:  [facility] in 5 weeks for vaginal delivery    Discharge Meds:   Current Outpatient Medications   Medication Sig Dispense Refill   • acetaminophen (TYLENOL) 500 MG Tab Take 2 Tablets by mouth every 6 hours as needed. 30 Tablet 0   • ibuprofen (MOTRIN) 800 MG Tab Take 1 Tablet by mouth every 8 hours as needed. 30 Tablet 0       Yamile Fay M.D. PhD

## 2021-09-16 NOTE — DISCHARGE PLANNING
Meds-to-Beds: Discharge prescription orders listed below delivered to patient's bedside. RN notified. Patient counseled. Patient elected to have co-payment billed to patient account.       Current Outpatient Medications   Medication Sig Dispense Refill   • acetaminophen (TYLENOL) 500 MG Tab Take 2 Tablets by mouth every 6 hours as needed. 30 Tablet 0   • ibuprofen (MOTRIN) 800 MG Tab Take 1 Tablet by mouth every 8 hours as needed. 30 Tablet 0      Fior Copeland, PharmD

## 2021-10-07 ENCOUNTER — TELEPHONE (OUTPATIENT)
Dept: OBGYN | Facility: CLINIC | Age: 21
End: 2021-10-07

## 2021-10-07 NOTE — TELEPHONE ENCOUNTER
Called patient regarding FMLA paperwork. Patient states info is missing regarding why she had to start disability early. Informed pt that I will work on this tomorrow.     And give her a call once FMLA is fixed.     Patient understood      3:45pm Called patient to notify that FMLA has been fixed with the Missing Info. FMLA faxed with confirmation.     Patient understood

## 2021-10-26 ENCOUNTER — POST PARTUM (OUTPATIENT)
Dept: OBGYN | Facility: CLINIC | Age: 21
End: 2021-10-26
Payer: COMMERCIAL

## 2021-10-26 VITALS — SYSTOLIC BLOOD PRESSURE: 126 MMHG | DIASTOLIC BLOOD PRESSURE: 69 MMHG | WEIGHT: 240.6 LBS | BODY MASS INDEX: 42.62 KG/M2

## 2021-10-26 DIAGNOSIS — O99.213 OBESITY AFFECTING PREGNANCY IN THIRD TRIMESTER: ICD-10-CM

## 2021-10-26 PROCEDURE — 90050 PR POSTPARTUM VISIT: CPT | Performed by: OBSTETRICS & GYNECOLOGY

## 2021-10-26 RX ORDER — ACETAMINOPHEN AND CODEINE PHOSPHATE 120; 12 MG/5ML; MG/5ML
1 SOLUTION ORAL DAILY
Qty: 28 TABLET | Refills: 11 | Status: SHIPPED | OUTPATIENT
Start: 2021-10-26 | End: 2022-03-01

## 2021-10-26 ASSESSMENT — FIBROSIS 4 INDEX: FIB4 SCORE: 0.42

## 2021-10-26 NOTE — PROGRESS NOTES
Pt is here for postpartum exam  Pharmacy verified.  Good Phone #:[phone]  Delivery date:9/14/2021  Both breast and bottle feeding.  LMP:Not yet  Last Pap:2021, neg per Pt  BCM:Discuss options  Pt states has bump on her left breast x 3d.  Pt states no other complaints for today.  EPDS:1

## 2021-10-26 NOTE — PROGRESS NOTES
Chen Gupta is a 21 y.o.  who returns to clinic today for her 6 week post partum after  performed at 37w1d on 21 for GHTN.  She reports that since being seen last she has been doing well.  She is bonding well with her infant and she is breastfeeding.  She states that initially she started having lighter bleeding but for the past 7 days she has had heavier bleeding and is passing clots. She states she does have a hx of heavier periods and wonders if this could be what is going on. She is not longer taking any pain medications.  She is participating in her usual activities but has followed the lifting precautions and has not yet resumed intercourse.  Denies breast complaints, abnormal vaginal discharge, urinary symptoms, bowel complaints, or other concerns at this time.    All other systems are reviewed and are negative.    PMH, PSH, SH, and FH reviewed with patient today - changes made in chart.    /69   Wt 109 kg (240 lb 9.6 oz)   LMP 2020   BMI 42.62 kg/m²   Breasts: no masses, engorgement, pain or erythema  CVS: RRR  Resp: CTA bilaterally  Abdomen: Abdomen soft, non-tender.  No masses,  No organomegaly  Extremities: no cyanosis, clubbing, no edema    No current facility-administered medications for this visit.       Lab: No results found for this or any previous visit (from the past 48 hour(s)).    [place]: 1    Chen Gupta is a 21 y.o.  returns to clinic today for her 6 week post partum/post op appointmet after .  Pregnancy c/b GHTN- BP today 126/69  #post partum/post op.  Meeting all milestones.  Discussed she may resume intercourse and no longer has any lifting precautions  ?Breastfeeding.  Encouraged continuation of exclusive BF until at least 6 months.   #Cervical cancer screening.  Last pap - had outside of [facility]. HERMAN filled out today.     #Post partum depression score. 1  #Post partum birth control method: She was counselled  about the various options including OCPs, patch, ring, IUDs, and implant.  --desires POP.  #AUB postpartum- stat US ordered. If concern for retained POCs, can consider cytotec vs surgery. Discussed if she has fevers or chills she needs to go to the emergency department. Will FU US result today.   #Recommend follow up in a year for annual or sooner if issues or concerns arise.  [facility]

## 2021-11-02 ENCOUNTER — HOSPITAL ENCOUNTER (OUTPATIENT)
Dept: RADIOLOGY | Facility: MEDICAL CENTER | Age: 21
End: 2021-11-02
Attending: OBSTETRICS & GYNECOLOGY
Payer: COMMERCIAL

## 2021-11-02 PROCEDURE — 76830 TRANSVAGINAL US NON-OB: CPT

## 2021-12-01 ENCOUNTER — TELEPHONE (OUTPATIENT)
Dept: OBGYN | Facility: CLINIC | Age: 21
End: 2021-12-01

## 2021-12-01 NOTE — TELEPHONE ENCOUNTER
Pt LM on  requesting forms to return to work to be filled out.  Spoke with pt and informed her FMLA has already been faxed to employer/disability company back in early October. Pt states she call us if she needs anything else.  1305 Spoke with pt and stated employer received forms but the ones they have on file are not signed by the provider. Informed pt I can refax it to them and will also leave a copy for her to . Pt agreed and had no other questions or concerns   Re faxed FMLA forms to [employer] and copy left with

## 2022-01-04 ENCOUNTER — TELEPHONE (OUTPATIENT)
Dept: OBGYN | Facility: CLINIC | Age: 22
End: 2022-01-04

## 2022-03-01 ENCOUNTER — OFFICE VISIT (OUTPATIENT)
Dept: URGENT CARE | Facility: CLINIC | Age: 22
End: 2022-03-01
Payer: COMMERCIAL

## 2022-03-01 VITALS
HEIGHT: 63 IN | TEMPERATURE: 97.9 F | DIASTOLIC BLOOD PRESSURE: 62 MMHG | WEIGHT: 243 LBS | SYSTOLIC BLOOD PRESSURE: 124 MMHG | RESPIRATION RATE: 20 BRPM | BODY MASS INDEX: 43.05 KG/M2 | OXYGEN SATURATION: 98 % | HEART RATE: 80 BPM

## 2022-03-01 DIAGNOSIS — R10.31 RLQ ABDOMINAL PAIN: ICD-10-CM

## 2022-03-01 LAB
APPEARANCE UR: CLEAR
BILIRUB UR STRIP-MCNC: NORMAL MG/DL
COLOR UR AUTO: YELLOW
GLUCOSE UR STRIP.AUTO-MCNC: NORMAL MG/DL
INT CON NEG: NEGATIVE
INT CON POS: POSITIVE
KETONES UR STRIP.AUTO-MCNC: NORMAL MG/DL
LEUKOCYTE ESTERASE UR QL STRIP.AUTO: NORMAL
NITRITE UR QL STRIP.AUTO: NORMAL
PH UR STRIP.AUTO: 5 [PH] (ref 5–8)
POC URINE PREGNANCY TEST: NEGATIVE
PROT UR QL STRIP: NORMAL MG/DL
RBC UR QL AUTO: NORMAL
SP GR UR STRIP.AUTO: 1.03
UROBILINOGEN UR STRIP-MCNC: 0.2 MG/DL

## 2022-03-01 PROCEDURE — 99203 OFFICE O/P NEW LOW 30 MIN: CPT | Performed by: FAMILY MEDICINE

## 2022-03-01 PROCEDURE — 81025 URINE PREGNANCY TEST: CPT | Performed by: FAMILY MEDICINE

## 2022-03-01 PROCEDURE — 81002 URINALYSIS NONAUTO W/O SCOPE: CPT | Performed by: FAMILY MEDICINE

## 2022-03-01 ASSESSMENT — FIBROSIS 4 INDEX: FIB4 SCORE: 0.42

## 2022-03-01 ASSESSMENT — PAIN SCALES - GENERAL: PAINLEVEL: 4=SLIGHT-MODERATE PAIN

## 2022-03-01 NOTE — PROGRESS NOTES
"  Subjective:      21 y.o. female presents to urgent care for right lower quadrant abdominal pain that started this morning.  There was no inciting event or trauma.  The pain is constant, described as achy, currently rated 5/10.  She has not yet taken anything for the pain.  She did have dysuria once this morning, but otherwise no changes to urinary urgency, frequency, dysuria, or hematuria.  Bowel movements remain regular and soft.  No recent travel, antibiotic use, change in diet, or exposure to exotic animals.  No prior abdominal surgeries.    She denies any other questions or concerns at this time.    Current problem list, medication, and past medical/surgical history were reviewed in Epic.    ROS  See HPI     Objective:      /62 (BP Location: Left arm, Patient Position: Sitting, BP Cuff Size: Adult long)   Pulse 80   Temp 36.6 °C (97.9 °F) (Temporal)   Resp 20   Ht 1.6 m (5' 3\")   Wt 110 kg (243 lb)   LMP  (Within Weeks)   SpO2 98%   Breastfeeding No   BMI 43.05 kg/m²     Physical Exam  Constitutional:       General: She is not in acute distress.     Appearance: She is not diaphoretic.   Cardiovascular:      Rate and Rhythm: Normal rate and regular rhythm.      Heart sounds: Normal heart sounds.   Pulmonary:      Effort: Pulmonary effort is normal. No respiratory distress.      Breath sounds: Normal breath sounds.   Abdominal:      Tenderness: There is abdominal tenderness (RLQ). There is no right CVA tenderness or left CVA tenderness. Positive signs include McBurney's sign. Negative signs include Soriano's sign, Rovsing's sign, psoas sign and obturator sign.   Neurological:      Mental Status: She is alert.   Psychiatric:         Mood and Affect: Affect normal.         Judgment: Judgment normal.       Assessment/Plan:     1. RLQ abdominal pain  hCG negative.  Urinalysis does not reveal UTI.  Ultrasound appendix has been scheduled for the soonest possible time, which is not until tomorrow.  She is " aware that if signs or symptoms change or worsen she needs to go directly to the emergency department.  - POCT Urinalysis  - POCT Pregnancy  - US-APPENDIX; Future      Instructed to return to Urgent Care or nearest Emergency Department if symptoms fail to improve, for any change in condition, further concerns, or new concerning symptoms. Patient states understanding of the plan of care and discharge instructions.    Elizabeth Lam M.D.

## 2022-03-02 ENCOUNTER — HOSPITAL ENCOUNTER (OUTPATIENT)
Dept: RADIOLOGY | Facility: MEDICAL CENTER | Age: 22
End: 2022-03-02
Attending: FAMILY MEDICINE
Payer: COMMERCIAL

## 2022-03-02 DIAGNOSIS — R10.31 RLQ ABDOMINAL PAIN: ICD-10-CM

## 2022-03-02 PROCEDURE — 76705 ECHO EXAM OF ABDOMEN: CPT

## 2022-09-25 ENCOUNTER — OFFICE VISIT (OUTPATIENT)
Dept: URGENT CARE | Facility: CLINIC | Age: 22
End: 2022-09-25
Payer: COMMERCIAL

## 2022-09-25 VITALS
HEIGHT: 63 IN | TEMPERATURE: 97.6 F | HEART RATE: 59 BPM | SYSTOLIC BLOOD PRESSURE: 112 MMHG | BODY MASS INDEX: 38.55 KG/M2 | OXYGEN SATURATION: 100 % | DIASTOLIC BLOOD PRESSURE: 62 MMHG | WEIGHT: 217.6 LBS | RESPIRATION RATE: 14 BRPM

## 2022-09-25 DIAGNOSIS — N64.9 BREAST LESION: ICD-10-CM

## 2022-09-25 PROCEDURE — 99213 OFFICE O/P EST LOW 20 MIN: CPT | Performed by: PHYSICIAN ASSISTANT

## 2022-09-25 RX ORDER — CEPHALEXIN 500 MG/1
500 CAPSULE ORAL 4 TIMES DAILY
Qty: 28 CAPSULE | Refills: 0 | Status: SHIPPED | OUTPATIENT
Start: 2022-09-25 | End: 2022-10-02

## 2022-09-25 RX ORDER — FLUOCINONIDE CREAM (EMULSIFIED BASE) 0.5 MG/G
CREAM TOPICAL
COMMUNITY
Start: 2022-06-14 | End: 2023-01-25

## 2022-09-25 ASSESSMENT — ENCOUNTER SYMPTOMS
MUSCULOSKELETAL NEGATIVE: 1
RESPIRATORY NEGATIVE: 1
NEUROLOGICAL NEGATIVE: 1
CONSTITUTIONAL NEGATIVE: 1
CARDIOVASCULAR NEGATIVE: 1

## 2022-09-25 ASSESSMENT — FIBROSIS 4 INDEX: FIB4 SCORE: 0.42

## 2022-09-25 NOTE — LETTER
JEOVANY  [facility] [place] [id number] [facility] [address]     September 25, 2022    Patient: Chen Gupta   YOB: 2000   Date of Visit: 9/25/2022       To Whom It May Concern:    Chen Gupta was seen and treated in our department on 9/25/2022. Please excuse from work on 9/25/2022    Sincerely,     Shayan Diaz P.A.-C.

## 2022-09-25 NOTE — PROGRESS NOTES
"  Subjective:     Chen Gupta  is a 21 y.o. female who presents for Acne (On right breast has had it for a while but has gotten worse and pt states it has begun to hurt. )       She presents today with a pimple on the right breast that has been ongoing over the last few weeks.  She states that symptoms began roughly 2 weeks after she got her nipples pierced.  Initially the nipple piercings were quite sensitive but have since healed well.  No nipple pain, nipple bleeding, nipple discharge.  No fever/chills/sweats, chest pain, shortness of breath.  Has not taken any medications for symptom support.     Review of Systems   Constitutional: Negative.    Respiratory: Negative.     Cardiovascular: Negative.    Musculoskeletal: Negative.    Skin:         Skin lesion on the right breast   Neurological: Negative.     No Known Allergies  History reviewed. No pertinent past medical history.     Objective:   /62 (BP Location: Right arm, Patient Position: Sitting, BP Cuff Size: Adult long)   Pulse (!) 59   Temp 36.4 °C (97.6 °F) (Temporal)   Resp 14   Ht 1.6 m (5' 3\")   Wt 98.7 kg (217 lb 9.6 oz)   SpO2 100%   BMI 38.55 kg/m²   Physical Exam  Vitals and nursing note reviewed.   Constitutional:       General: She is not in acute distress.     Appearance: She is not ill-appearing or toxic-appearing.   HENT:      Head: Normocephalic.      Nose: No rhinorrhea.   Eyes:      General: No scleral icterus.     Conjunctiva/sclera: Conjunctivae normal.   Pulmonary:      Effort: Pulmonary effort is normal. No respiratory distress.      Breath sounds: No stridor.   Musculoskeletal:      Cervical back: Neck supple.   Skin:            Comments: A small, superficial, nonpainful, nonfluctuant, nonindurated skin lesion present over the right breast between the 9:00 to 12 o'clock position.  Area was roughly 0.25 cm in diameter.  Lesion was bluish in nature.  No signs of skin trauma or injury.  No nipple erythema, " bleeding, drainage.   Neurological:      Mental Status: She is alert and oriented to person, place, and time.   Psychiatric:         Mood and Affect: Mood normal.         Behavior: Behavior normal.         Thought Content: Thought content normal.         Judgment: Judgment normal.           Diagnostic testing: None    Assessment/Plan:     Encounter Diagnoses   Name Primary?    Breast lesion             Plan for care for today's complaint includes Keflex for cellulitis prophylaxis.  At this time the superficial skin lesion did not appear to be an abscess in nature as there is no fluctuance or induration noted.  No active nipple bleeding or drainage on exam today.  We will have the patient follow-up with primary care provider for further evaluation and management of this breast lesion.  Return to urgent care or follow-up in the emergency department if symptoms worsen..  Prescription for Keflex provided.    See AVS Instructions below for written guidance provided to patient on after-visit management and care in addition to our verbal discussion during the visit.    Please note that this dictation was created using voice recognition software. I have attempted to correct all errors, but there may be sound-alike, spelling, grammar and possibly content errors that I did not discover before finalizing the note.    Shayan Diaz PA-C

## 2023-01-25 ENCOUNTER — APPOINTMENT (OUTPATIENT)
Dept: RADIOLOGY | Facility: MEDICAL CENTER | Age: 23
End: 2023-01-25
Payer: COMMERCIAL

## 2023-01-25 ENCOUNTER — HOSPITAL ENCOUNTER (EMERGENCY)
Facility: MEDICAL CENTER | Age: 23
End: 2023-01-25
Attending: EMERGENCY MEDICINE
Payer: COMMERCIAL

## 2023-01-25 ENCOUNTER — APPOINTMENT (OUTPATIENT)
Dept: RADIOLOGY | Facility: MEDICAL CENTER | Age: 23
End: 2023-01-25
Attending: EMERGENCY MEDICINE
Payer: COMMERCIAL

## 2023-01-25 VITALS
DIASTOLIC BLOOD PRESSURE: 58 MMHG | WEIGHT: 236.55 LBS | OXYGEN SATURATION: 97 % | RESPIRATION RATE: 18 BRPM | HEART RATE: 88 BPM | TEMPERATURE: 99.2 F | SYSTOLIC BLOOD PRESSURE: 137 MMHG | HEIGHT: 63 IN | BODY MASS INDEX: 41.91 KG/M2

## 2023-01-25 DIAGNOSIS — R10.2 PELVIC PAIN: ICD-10-CM

## 2023-01-25 DIAGNOSIS — Z34.90 PREGNANCY, UNSPECIFIED GESTATIONAL AGE: ICD-10-CM

## 2023-01-25 LAB
ABO GROUP BLD: NORMAL
ALBUMIN SERPL BCP-MCNC: 2.8 G/DL (ref 3.2–4.9)
ALBUMIN/GLOB SERPL: 1 G/DL
ALP SERPL-CCNC: 64 U/L (ref 30–99)
ALT SERPL-CCNC: 15 U/L (ref 2–50)
ANION GAP SERPL CALC-SCNC: 9 MMOL/L (ref 7–16)
APPEARANCE UR: CLEAR
APTT PPP: 27.8 SEC (ref 24.7–36)
AST SERPL-CCNC: 14 U/L (ref 12–45)
B-HCG SERPL-ACNC: ABNORMAL MIU/ML (ref 0–5)
BACTERIA #/AREA URNS HPF: ABNORMAL /HPF
BACTERIA GENITAL QL WET PREP: NORMAL
BASOPHILS # BLD AUTO: 0.7 % (ref 0–1.8)
BASOPHILS # BLD: 0.09 K/UL (ref 0–0.12)
BILIRUB SERPL-MCNC: <0.2 MG/DL (ref 0.1–1.5)
BILIRUB UR QL STRIP.AUTO: NEGATIVE
BLD GP AB SCN SERPL QL: NORMAL
BUN SERPL-MCNC: 10 MG/DL (ref 8–22)
C TRACH DNA GENITAL QL NAA+PROBE: NEGATIVE
CALCIUM ALBUM COR SERPL-MCNC: 9.4 MG/DL (ref 8.5–10.5)
CALCIUM SERPL-MCNC: 8.4 MG/DL (ref 8.5–10.5)
CHLORIDE SERPL-SCNC: 108 MMOL/L (ref 96–112)
CO2 SERPL-SCNC: 20 MMOL/L (ref 20–33)
COLOR UR: YELLOW
CREAT SERPL-MCNC: 0.39 MG/DL (ref 0.5–1.4)
EOSINOPHIL # BLD AUTO: 0.12 K/UL (ref 0–0.51)
EOSINOPHIL NFR BLD: 1 % (ref 0–6.9)
EPI CELLS #/AREA URNS HPF: ABNORMAL /HPF
ERYTHROCYTE [DISTWIDTH] IN BLOOD BY AUTOMATED COUNT: 41.1 FL (ref 35.9–50)
GFR SERPLBLD CREATININE-BSD FMLA CKD-EPI: 143 ML/MIN/1.73 M 2
GLOBULIN SER CALC-MCNC: 2.7 G/DL (ref 1.9–3.5)
GLUCOSE SERPL-MCNC: 82 MG/DL (ref 65–99)
GLUCOSE UR STRIP.AUTO-MCNC: NEGATIVE MG/DL
HCG SERPL QL: POSITIVE
HCT VFR BLD AUTO: 42.3 % (ref 37–47)
HGB BLD-MCNC: 14.5 G/DL (ref 12–16)
HYALINE CASTS #/AREA URNS LPF: ABNORMAL /LPF
IMM GRANULOCYTES # BLD AUTO: 0.1 K/UL (ref 0–0.11)
IMM GRANULOCYTES NFR BLD AUTO: 0.8 % (ref 0–0.9)
INR PPP: 1.08 (ref 0.87–1.13)
KETONES UR STRIP.AUTO-MCNC: NEGATIVE MG/DL
LEUKOCYTE ESTERASE UR QL STRIP.AUTO: NEGATIVE
LIPASE SERPL-CCNC: 20 U/L (ref 11–82)
LYMPHOCYTES # BLD AUTO: 1.91 K/UL (ref 1–4.8)
LYMPHOCYTES NFR BLD: 15.2 % (ref 22–41)
MCH RBC QN AUTO: 30.4 PG (ref 27–33)
MCHC RBC AUTO-ENTMCNC: 34.3 G/DL (ref 33.6–35)
MCV RBC AUTO: 88.7 FL (ref 81.4–97.8)
MICRO URNS: ABNORMAL
MONOCYTES # BLD AUTO: 0.9 K/UL (ref 0–0.85)
MONOCYTES NFR BLD AUTO: 7.1 % (ref 0–13.4)
MUCOUS THREADS #/AREA URNS HPF: ABNORMAL /HPF
N GONORRHOEA DNA GENITAL QL NAA+PROBE: NEGATIVE
NEUTROPHILS # BLD AUTO: 9.47 K/UL (ref 2–7.15)
NEUTROPHILS NFR BLD: 75.2 % (ref 44–72)
NITRITE UR QL STRIP.AUTO: NEGATIVE
NRBC # BLD AUTO: 0 K/UL
NRBC BLD-RTO: 0 /100 WBC
NUMBER OF RH DOSES IND 8505RD: NORMAL
PH UR STRIP.AUTO: 6.5 [PH] (ref 5–8)
PLATELET # BLD AUTO: 313 K/UL (ref 164–446)
PMV BLD AUTO: 11.2 FL (ref 9–12.9)
POTASSIUM SERPL-SCNC: 3.9 MMOL/L (ref 3.6–5.5)
PROT SERPL-MCNC: 5.5 G/DL (ref 6–8.2)
PROT UR QL STRIP: 300 MG/DL
PROTHROMBIN TIME: 13.9 SEC (ref 12–14.6)
RBC # BLD AUTO: 4.77 M/UL (ref 4.2–5.4)
RBC # URNS HPF: ABNORMAL /HPF
RBC UR QL AUTO: NEGATIVE
RH BLD: NORMAL
SIGNIFICANT IND 70042: NORMAL
SITE SITE: NORMAL
SODIUM SERPL-SCNC: 137 MMOL/L (ref 135–145)
SOURCE SOURCE: NORMAL
SP GR UR STRIP.AUTO: 1.02
SPECIMEN SOURCE: NORMAL
UROBILINOGEN UR STRIP.AUTO-MCNC: 0.2 MG/DL
WBC # BLD AUTO: 12.6 K/UL (ref 4.8–10.8)
WBC #/AREA URNS HPF: ABNORMAL /HPF

## 2023-01-25 PROCEDURE — 99283 EMERGENCY DEPT VISIT LOW MDM: CPT | Performed by: STUDENT IN AN ORGANIZED HEALTH CARE EDUCATION/TRAINING PROGRAM

## 2023-01-25 PROCEDURE — 80053 COMPREHEN METABOLIC PANEL: CPT

## 2023-01-25 PROCEDURE — 85730 THROMBOPLASTIN TIME PARTIAL: CPT

## 2023-01-25 PROCEDURE — 86900 BLOOD TYPING SEROLOGIC ABO: CPT

## 2023-01-25 PROCEDURE — 85025 COMPLETE CBC W/AUTO DIFF WBC: CPT

## 2023-01-25 PROCEDURE — 86850 RBC ANTIBODY SCREEN: CPT

## 2023-01-25 PROCEDURE — 36415 COLL VENOUS BLD VENIPUNCTURE: CPT

## 2023-01-25 PROCEDURE — 87491 CHLMYD TRACH DNA AMP PROBE: CPT

## 2023-01-25 PROCEDURE — 99284 EMERGENCY DEPT VISIT MOD MDM: CPT

## 2023-01-25 PROCEDURE — 87591 N.GONORRHOEAE DNA AMP PROB: CPT

## 2023-01-25 PROCEDURE — 86901 BLOOD TYPING SEROLOGIC RH(D): CPT | Mod: 91

## 2023-01-25 PROCEDURE — 76801 OB US < 14 WKS SINGLE FETUS: CPT

## 2023-01-25 PROCEDURE — 84703 CHORIONIC GONADOTROPIN ASSAY: CPT

## 2023-01-25 PROCEDURE — 87086 URINE CULTURE/COLONY COUNT: CPT

## 2023-01-25 PROCEDURE — 81001 URINALYSIS AUTO W/SCOPE: CPT

## 2023-01-25 PROCEDURE — 85610 PROTHROMBIN TIME: CPT

## 2023-01-25 PROCEDURE — 83690 ASSAY OF LIPASE: CPT

## 2023-01-25 PROCEDURE — 84702 CHORIONIC GONADOTROPIN TEST: CPT

## 2023-01-25 ASSESSMENT — FIBROSIS 4 INDEX: FIB4 SCORE: 0.44

## 2023-01-25 NOTE — ED TRIAGE NOTES
Chen Santiago Candy  22 y.o. female  Chief Complaint   Patient presents with    Pregnancy     Pt is pregnant, unsure how far along. Pt has not had an appointment yet to confirm.    Abdominal Pain     Pt has had abdominal pain and cramping with dark, tarry stools. Started this am.        Vitals:    01/25/23 1144   BP: (!) 154/67   Pulse: 83   Resp: 18   Temp: 36.7 °C (98 °F)   SpO2: 98%       Patient educated on triage process and encouraged to alert staff of any changes in condition.    Pt ambulatory to triage with the above complaint.

## 2023-01-25 NOTE — ED PROVIDER NOTES
"  ER Provider Note    Scribed for Rochelle Ramos M.d. by Sameera García. 1/25/2023  1:25 PM    Primary Care Provider: THALIA Rothman    CHIEF COMPLAINT  Chief Complaint   Patient presents with    Pregnancy     Pt is pregnant, unsure how far along. Pt has not had an appointment yet to confirm.    Abdominal Pain     Pt has had abdominal pain and cramping with dark, tarry stools. Started this am.      EXTERNAL RECORDS REVIEWED  Inpatient Notes    patient delivered a baby here at [facility] in September 2021.  Review of urinalysis from a year ago reveals that there was no protein in the urine at that time.    HPI/ROS  LIMITATION TO HISTORY   Select: : None  OUTSIDE HISTORIAN(S):  None    Chen Giovana Gupta is a 22 y.o. female who presents to the ED complaining of abdominal pain in pregnancy onset this morning. Patient states her last menstrual period was December 8th and had a positive pregnancy test on January 11th. She did report some vaginal spotting on Janurary 11th, stating she used a pad and a half that day but denies any other bleeding since. She states this morning she woke up with cramping over her uterus and in her back, similar but stronger than regular period cramps. She also reports having a pressure feeling in her vagina and rectum, followed by an episode of black stool that \"floated.\" She denies eating any beets or having any Pepto Bismol recently. She denies any vaginal itching, discharge or STD concern. She states she had not had any abdominal pain until today. Patient states this is her second pregnancy and her last one resulted in a live vaginal birth. No hx of tube or ovary surgery. Mom had an ectopic pregnancy. No urinary sxs. No nausea or vomiting.    PAST MEDICAL HISTORY  History reviewed. No pertinent past medical history.    SURGICAL HISTORY  History reviewed. No pertinent surgical history.    FAMILY HISTORY  Family History   Problem Relation Age of Onset    No Known " "Problems Mother     Heart Disease Father     Diabetes Sister        SOCIAL HISTORY   reports that she has never smoked. She has never used smokeless tobacco. She reports that she does not currently use alcohol. She reports that she does not use drugs.    CURRENT MEDICATIONS  Previous Medications    FLUOCINONIDE EMULSIFIED BASE 0.05 % CREAM    Apply thin layer to oral lesions 2 to 4 times per day.    SPRINTEC 28 0.25-35 MG-MCG PER TABLET    Take 1 Tablet by mouth every day.    TERCONAZOLE (TERAZOL) CREAM    INSERT 1 APPLICATORFUL VAGINALLY ONCE DAILY AT BEDTIME FOR 7 DAYS       ALLERGIES  Patient has no known allergies.    PHYSICAL EXAM  /63   Pulse 84   Temp 36.7 °C (98 °F) (Temporal)   Resp 18   Ht 1.6 m (5' 3\")   Wt 107 kg (236 lb 8.9 oz)   LMP 12/13/2022   SpO2 97%   BMI 41.90 kg/m²   Constitutional:  Well developed, well nourished; No acute distress   HENT: Normocephalic, Atraumatic, Bilateral external ears normal, Oropharynx moist, No erythema or exudates in posterior oropharynx.   Eyes: PERRL, EOMI, Conjunctiva normal, No discharge.   Neck: Normal range of motion, supple, nontender  Lymphatic: No lymphadenopathy noted.   Cardiovascular: Normal heart rate, Normal rhythm, No murmurs, rubs or gallops   Thorax & Lungs: CTA=bilaterally;  No respiratory distress, No wheezing rales, or rhonchi; No chest tenderness. No crepitus or subQ air  Abdomen: No guarding no rebound, no masses, no pulsatile mass, mild tenderness to suprapubic and left lower quadrant, no distention  Pelvic: External genitalia normal without lesions or vesicles.  No blood in the vaginal vault.  Cervix is closed.  There is some tenderness in the left adnexal area without obvious adnexal mass.  Rectal: Small amount of dark brown stool on examination.  Guaiac negative.  Skin: Warm, Dry, No erythema, No rash.   Back: No tenderness, No CVA tenderness.   Extremities: 2+ dp and pt pulses bilateral LEs;  Nontender; no pretibial " edema  Neurologic: Alert & oriented x 4, clear speech  Psychiatric: appropriate, normal affect    DIAGNOSTIC STUDIES    Labs:   Results for orders placed or performed during the hospital encounter of 01/25/23   CBC WITH DIFFERENTIAL   Result Value Ref Range    WBC 12.6 (H) 4.8 - 10.8 K/uL    RBC 4.77 4.20 - 5.40 M/uL    Hemoglobin 14.5 12.0 - 16.0 g/dL    Hematocrit 42.3 37.0 - 47.0 %    MCV 88.7 81.4 - 97.8 fL    MCH 30.4 27.0 - 33.0 pg    MCHC 34.3 33.6 - 35.0 g/dL    RDW 41.1 35.9 - 50.0 fL    Platelet Count 313 164 - 446 K/uL    MPV 11.2 9.0 - 12.9 fL    Neutrophils-Polys 75.20 (H) 44.00 - 72.00 %    Lymphocytes 15.20 (L) 22.00 - 41.00 %    Monocytes 7.10 0.00 - 13.40 %    Eosinophils 1.00 0.00 - 6.90 %    Basophils 0.70 0.00 - 1.80 %    Immature Granulocytes 0.80 0.00 - 0.90 %    Nucleated RBC 0.00 /100 WBC    Neutrophils (Absolute) 9.47 (H) 2.00 - 7.15 K/uL    Lymphs (Absolute) 1.91 1.00 - 4.80 K/uL    Monos (Absolute) 0.90 (H) 0.00 - 0.85 K/uL    Eos (Absolute) 0.12 0.00 - 0.51 K/uL    Baso (Absolute) 0.09 0.00 - 0.12 K/uL    Immature Granulocytes (abs) 0.10 0.00 - 0.11 K/uL    NRBC (Absolute) 0.00 K/uL   COMP METABOLIC PANEL   Result Value Ref Range    Sodium 137 135 - 145 mmol/L    Potassium 3.9 3.6 - 5.5 mmol/L    Chloride 108 96 - 112 mmol/L    Co2 20 20 - 33 mmol/L    Anion Gap 9.0 7.0 - 16.0    Glucose 82 65 - 99 mg/dL    Bun 10 8 - 22 mg/dL    Creatinine 0.39 (L) 0.50 - 1.40 mg/dL    Calcium 8.4 (L) 8.5 - 10.5 mg/dL    AST(SGOT) 14 12 - 45 U/L    ALT(SGPT) 15 2 - 50 U/L    Alkaline Phosphatase 64 30 - 99 U/L    Total Bilirubin <0.2 0.1 - 1.5 mg/dL    Albumin 2.8 (L) 3.2 - 4.9 g/dL    Total Protein 5.5 (L) 6.0 - 8.2 g/dL    Globulin 2.7 1.9 - 3.5 g/dL    A-G Ratio 1.0 g/dL   LIPASE   Result Value Ref Range    Lipase 20 11 - 82 U/L   HCG QUAL SERUM   Result Value Ref Range    Beta-Hcg Qualitative Serum Positive (A) Negative   URINALYSIS,CULTURE IF INDICATED    Specimen: Urine   Result Value Ref Range     Color Yellow     Character Clear     Specific Gravity 1.018 <1.035    Ph 6.5 5.0 - 8.0    Glucose Negative Negative mg/dL    Ketones Negative Negative mg/dL    Protein 300 (A) Negative mg/dL    Bilirubin Negative Negative    Urobilinogen, Urine 0.2 Negative    Nitrite Negative Negative    Leukocyte Esterase Negative Negative    Occult Blood Negative Negative    Micro Urine Req Microscopic    COD (ADULT)   Result Value Ref Range    ABO Grouping Only O     Rh Grouping Only POS     Antibody Screen-Cod NEG    PROTHROMBIN TIME   Result Value Ref Range    PT 13.9 12.0 - 14.6 sec    INR 1.08 0.87 - 1.13   APTT   Result Value Ref Range    APTT 27.8 24.7 - 36.0 sec   CORRECTED CALCIUM   Result Value Ref Range    Correct Calcium 9.4 8.5 - 10.5 mg/dL   ESTIMATED GFR   Result Value Ref Range    GFR (CKD-EPI) 143 >60 mL/min/1.73 m 2   HCG QUANTITATIVE SERUM   Result Value Ref Range    Bhcg 26352.0 (H) 0.0 - 5.0 mIU/mL   WET PREP    Specimen: Vaginal; Genital   Result Value Ref Range    Significant Indicator NEG     Source GEN     Site VAGINAL     Wet Prep For Parasites       No yeast.  No motile Trichomonas seen.  No clue cells seen.     Chlamydia/GC, PCR (Genital/Anal swab)    Specimen: Genital   Result Value Ref Range    Source Vaginal    URINE MICROSCOPIC (W/UA)   Result Value Ref Range    WBC 2-5 /hpf    RBC 2-5 (A) /hpf    Bacteria Few (A) None /hpf    Epithelial Cells Few /hpf    Mucous Threads Many /hpf    Hyaline Cast 0-2 /lpf       Radiology:   The attending emergency physician has independently interpreted the diagnostic imaging associated with this visit and am waiting the final reading from the radiologist.     ALEXANDRA reviewed the US and does not see any obvious IUP    US-OB 1ST TRIMESTER WITH TRANSVAGINAL (COMBO)   Final Result      1.  Small amount of fluid within the endometrial canal   2.  No intrauterine or extrauterine pregnancy demonstrated.   3.  Ectopic pregnancy is not excluded. Recommend followup ultrasound  "and serial beta-hCG levels.              COURSE & MEDICAL DECISION MAKING     ED Observation Status? No; Patient does not meet criteria for ED Observation.     1:25 PM - Patient seen and examined at bedside. Discussed plan of care, including a pelvic exam and imaging. Patient agrees to the plan of care. Ordered for US-OB 1st trimester with transvaginal, chlamydia/GC PCR, HCG quantitative, Rh type, Urine microscopic w/ UA, corrected calcium, COD, prothrombin time, APTT, CBC-diff, CMP, lipase, and HCG qual to evaluate her symptoms.     3:30 PM - I performed a pelvic exam with the nurse at bedside. Paged Gynecology    3:55 PM - Repaged Gynecology    4:11 PM - Repaged Gynecology, spoke to OR nurse who reported Dr. Ivory is in surgery and will call back following the completion of his case.     5:02 PM - Patient was reevaluated at bedside. Discussed lab and radiology results with the patient and informed them that I am waiting for consult from Gynecology after he gets out of the OR.      5:01 PM I discussed the patient's case and the above findings with Dr. Ivory (Gynecology) who will come consult on the patient and let me know his recommendations for next steps.    1720: Dr. Ivory at bedside    1750: Dr. Ivory has evaluated the patient at the bedside with bedside US.  He had a long discussion with patient regarding options including going to OR.  Patient and Dr. Ivory have decided to do a 48 hours follow up in his office with repeat quant etc.  Dr. Ivory's nurse will call pt tomorrow for her appt time. He has given patient very strict return precautions and patient understands and agrees.  Please see Dr. Ivory's consult note in EPIC.      INITIAL ASSESSMENT AND PLAN  Care Narrative: Patient presents to the ER complaining of a sudden onset of lower pelvic pain which began earlier today.  She describes it as \"uterine cramping.\"  It is improved at this time when compared to earlier today when it was more severe.  No vaginal " bleeding.  Her last menstrual period was December 8.  She is currently pregnant with a quantitative hCG of over 15,000.  Patient has some tenderness in the left adnexal area on her pelvic examination as well as in her left lower abdomen on abdominal examination.  Despite having a quantitative hCG of over 15,000, there is no intrauterine pregnancy identified on pelvic ultrasound and I am concerned about ectopic pregnancy.  Plan will be to contact gynecology on-call for consultation.  Additionally, patient reports a black looking stool earlier today.  She did have a small amount of brown stool on her rectal examination which is guaiac negative.  At this time no concern for GI bleed.  Hemoglobin is stable.  Vital signs are stable.  Of highest concern is possibility of ectopic pregnancy. I contacted gynecologist on call and Dr. Ivory will kindly come down to ER to evaluate patient.  At this time, further evaluation and management will be done by gynecology.    Dr. Ivory has come down to ER to evaluate patient.  Please see his consult note. He states that it is ok to discharge pt home since it was decided that patient will follow up with him in his office in 48 hours.      1530: Paged gynecology    1555: Repaged gynecology    ADDITIONAL PROBLEM LIST AND DISPOSITION    Problem #1: Lower abdominal pain/cramping  Problem #2: Positive pregnancy test with 15,000 quantitative hCG and no IUP on ultrasound--concern is high for ectopic  Problem #3: Black stool- guaiac negative    I have discussed management of the patient with the following physicians and CRISELDA's:  Dr. Ivory, Gynecology    Discussion of management with other [facility] or appropriate source(s): None     Escalation of care considered, and ultimately not performed: diagnostic imaging.  I considered further evaluation with CT scanning, but patient is pregnant so CT scan not indicated at this time.    Barriers to care at this time, including but not limited to:  Patient does not  have a gynecologist. .     Decision tools and prescription drugs considered including, but not limited to: Pain Medications pt states that she is not having much pain and therefore does not need any pain meds. .      FINAL DIANGOSIS  1. Pregnancy, unspecified gestational age Acute   2. Pelvic pain Acute      I, Sameera García (Rodriguezibleah), am scribing for, and in the presence of, Rochelle Ramos M.D..    Electronically signed by: Sameera García (Rodriguezibleah), 1/25/2023    This dictation has been created using voice recognition software. The accuracy of the dictation is limited by the abilities of the software. I expect there may be some errors of grammar and possibly content. I made every attempt to manually correct the errors within my dictation. However, errors related to voice recognition software may still exist and should be interpreted within the appropriate context.     I, Rochelle Ramos M.D. personally performed the services described in this documentation, as scribed by Sameera García in my presence, and it is both accurate and complete.     The note accurately reflects work and decisions made by me.  Rochelle Ramos M.D.  1/25/2023  3:46 PM

## 2023-01-25 NOTE — ED NOTES
Ambulated to room w/steady gait. Call light within reach. Instructed to call staff for assistance.  Urine collected, specimen sent to lab.

## 2023-01-26 ENCOUNTER — TELEPHONE (OUTPATIENT)
Dept: OBGYN | Facility: CLINIC | Age: 23
End: 2023-01-26
Payer: COMMERCIAL

## 2023-01-26 DIAGNOSIS — O36.80X0 PREGNANCY OF UNKNOWN ANATOMIC LOCATION: ICD-10-CM

## 2023-01-26 NOTE — DISCHARGE INSTRUCTIONS
Please follow-up with Dr. Ivory as instructed on Friday.  His nurse will be contacting you tomorrow morning for your appointment time.    Return to the ER immediately for any vaginal bleeding, worsening pelvic pain, dizziness or lightheadedness, episode of passing out, nausea, vomiting, or for any worsening!

## 2023-01-26 NOTE — CONSULTS
GYN ED Consultation    Chen Gupta  2000  [id number]    CC/reason for consult: Pregnancy of unknown location    Requesting physician: Rochelle Ramos M.D.     HPI: Chen Gupta is a 22 y.o.  at 6 weeks 6 days by LMP (2022), who presents with acute pelvic pain and significant diarrhea and painful bowel movement.  This is an unintended but desired pregnancy.    She notes having regular menstrual cycles every 28 to 30 days, usually heavy with clotting.  Approximately 2 weeks ago (which would be 4 weeks after her last regular period, she did note 1 day of bleeding/spotting with some clots, but this resolved.  She has had no bleeding since and none this morning.  The pain she noticed is in the uterus and posteriorly, with some radiation to her left hand side, not discretely into her flanks.  She denies any hematuria or severe Judi groin type pain.  Denies fever, chills, nausea, vomiting, chest pain, shortness of breath, palpitations, dizziness.    Her last pregnancy was in , , uncomplicated.    He has no prior history of STIs, PID, TOA, ectopic pregnancy, uterine/tubal surgery or other pelvic surgeries.    ER work-up noted a beta-hCG of 15,000, and transvaginal pelvic ultrasound with endometrial fluid without gestational sac or yolk sac, no adnexal or tubal masses or free fluid/blood noted.      OB History    Para Term  AB Living   2 1 1 0 0 1   SAB IAB Ectopic Molar Multiple Live Births   0 0 0 0 0 1      # Outcome Date GA Lbr Power/2nd Weight Sex Delivery Anes PTL Lv   2 Current            1 Term 21 37w1d 06:29 / 00:35 6 lb 3.1 oz F Vag-Spont EPI N JAMES       GYN Hx:     History reviewed. No pertinent past medical history.    History reviewed. No pertinent surgical history.    Medications:   No current Epic-ordered facility-administered medications on file.     Current Outpatient Medications Ordered in Epic   Medication Sig Dispense Refill     "COLLAGEN PO Take 1 Tablet by mouth every day.      Prenatal Vit-Fe Fumarate-FA (PRENATAL PO) Take 1 Tablet by mouth every day.         Allergies: Patient has no known allergies.    Social History     Socioeconomic History    Marital status:    Tobacco Use    Smoking status: Never    Smokeless tobacco: Never   Vaping Use    Vaping Use: Never used   Substance and Sexual Activity    Alcohol use: Not Currently    Drug use: No    Sexual activity: Yes     Partners: Male     Birth control/protection: None       Family History   Problem Relation Age of Onset    No Known Problems Mother     Heart Disease Father     Diabetes Sister        Physical Exam:  /58   Pulse 88   Temp 37.3 °C (99.2 °F) (Temporal)   Resp 18   Ht 5' 3\"   Wt 236 lb 8.9 oz   LMP 12/13/2022   SpO2 97%   BMI 41.90 kg/m²   gen: AAO, NAD, affect appropriate  CV: RRR on monitor; no LE edema  resp: Respiratory distress  Abd: soft, NT, ND, no masses, no organomegaly, no hernias.  Significant pressure applied during repeat abdominal ultrasound with no pain elicited.  Skin: warm/dry, no lesions    Labs:    Bhcg 0.0 - 5.0 mIU/mL 42550.0 High     Comment:    Gestational Age  Expected hCG   0.2-1 week       5-50   1-2  weeks          2-3 weeks        100-5,000   3-4 weeks        500-10,000   4-5 weeks        1,000-50,000   5-6 weeks        10,000-100,000   6-8 weeks        15,000-200,000   2-3 months       10,000-100,000        Component Ref Range & Units  1:09 PM    WBC /hpf 2-5    Comment: Female   <12 Yr 0-2   >12 Yr 0-5   Male   None    RBC /hpf 2-5 Abnormal     Comment: Female   >12 Yr 0-2   Male   None    Bacteria None /hpf Few Abnormal     Epithelial Cells /hpf Few    Mucous Threads /hpf Many    Hyaline Cast /lpf 0-2    Resulting Agency  M   0 Result Notes  Component Ref Range & Units 12:00 PM   (1/25/23) 1 yr ago   (9/14/21) 1 yr ago   (9/13/21) 1 yr ago   (9/7/21) 1 yr ago   (7/20/21) 1 yr ago   (7/6/21) 1 yr ago   (4/1/21)   WBC " 4.8 - 10.8 K/uL 12.6 High   23.1 High   14.2 High   15.7 High   17.3 High   17.6 High   16.7 High     RBC 4.20 - 5.40 M/uL 4.77  4.07 Low   4.55  4.39  4.16 Low   4.09 Low   4.37    Hemoglobin 12.0 - 16.0 g/dL 14.5  12.3  13.8  13.4  12.6  12.5  13.1    Hematocrit 37.0 - 47.0 % 42.3  37.5  43.1  40.1  38.3  37.6  39.8    MCV 81.4 - 97.8 fL 88.7  92.1  94.7  91.3  92.1  91.9  91.1    MCH 27.0 - 33.0 pg 30.4  30.2  30.3  30.5  30.3  30.6  30.0    MCHC 33.6 - 35.0 g/dL 34.3  32.8 Low   32.0 Low   33.4 Low   32.9 Low   33.2 Low   32.9 Low     RDW 35.9 - 50.0 fL 41.1  46.5  47.1  45.0  43.4  44.0  41.2    Platelet Count 164 - 446 K/uL 313  216  243  243  283  273  279           Sodium 135 - 145 mmol/L 137  138  137  138    Potassium 3.6 - 5.5 mmol/L 3.9  4.1  3.8  3.9    Chloride 96 - 112 mmol/L 108  106  104  104    Co2 20 - 33 mmol/L 20  21  20  22    Anion Gap 7.0 - 16.0 9.0  11.0  13.0  12.0    Glucose 65 - 99 mg/dL 82  90  77  84    Bun 8 - 22 mg/dL 10  9  10  8    Creatinine 0.50 - 1.40 mg/dL 0.39 Low   0.35 Low   0.43 Low   0.44 Low     Calcium 8.5 - 10.5 mg/dL 8.4 Low   9.1  8.9  9.2    AST(SGOT) 12 - 45 U/L 14  16  20  17    ALT(SGPT) 2 - 50 U/L 15  15  17  14        Imaging:     Pelvic ultrasound (radiology) 1/25/2023, 3pm: Images reviewed with patient  UTERUS:  Uterus shows small amount of fluid within the endometrial canal.  No definite gestational sac.  No fetal pole or yolk sac demonstrated.  OVARIES:  The right ovary is not visualized, likely scar by bowel.  The left ovary measures 2.15 cm x 1.61 cm x 2.02 cm. Doppler examination of the left ovary shows normal waveforms.  No adnexal masses are seen.  No free fluid in the pelvis.  IMPRESSION:  1.  Small amount of fluid within the endometrial canal  2.  No intrauterine or extrauterine pregnancy demonstrated.  3.  Ectopic pregnancy is not excluded. Recommend followup ultrasound and serial beta-hCG levels.    Repeat pelvic US, 1/25/23, 6pm (bedside, performed  by me)  Confirm the above findings, fluid and uterus without yolk sac, fetal heart activity.  No adnexal masses or cyst, no free fluid, entirety of procedure transabdominally did not elicit any pain or acute findings.        Assessment/plan: 22 y.o.  with pregnancy of unknown location, pain improved, benign abdominal examination, stable vital signs, afebrile no bleeding.    She was thoroughly counseled on the situation, and that it cannot be confirmed whether she has a viable pregnancy, and nonviable pregnancy/miscarriage, or ectopic pregnancy.  Her beta hCG level at 15,000 is consistent with an approximately 6 to 7-week pregnancy, and should be able to see a gestational sac or yolk sac at this time, so the most likely scenario is this is a nonviable pregnancy or possibly an ectopic pregnancy.  Two pelvic ultrasounds, approximately 3 hours apart did not note any adnexal masses, free fluid, or elicit significant pain.         I counseled on the options of:  (1) Observation and close beta-hCG follow-up: I recommend a repeat beta-hCG drawn in 48 hours (Friday), and we can make further decisions based on the trend.  If this is trending upwards it may be a viable pregnancy (low likelihood), if it plateaus, this is likely a nonviable/ectopic pregnancy and we would likely recommend methotrexate if she still appears stable, and if it is significantly dropping this is likely a nonviable/miscarriage, and this can either be observed, counseled for misoprostol therapy, or counseled for dilation and aspiration of the uterus.    (2) Methotrexate today.  This would treat an ectopic were aggressively/safely.  There is a low likelihood that this is a viable pregnancy, and no adnexal masses are seen, at her beta-hCG level she is a candidate for methotrexate therapy.  She is counseled that this is a therapy that stop cell growth, so if there is a viable pregnancy there is a chance of her being toxic effects to the fetus.  Her  AST/ALT/creatinine are all within normal limits and she would be a good candidate for methotrexate.  Since she is hemodynamically stable, option of a 1 as noted above is also a viable option before deciding methotrexate therapy.    (3) Diagnostic laparoscopy  Due to the fact that there are no adnexal masses/cysts/abnormal tubal pathology/free fluid/blood seen on 2 separate ultrasounds, there is a low utility to performing a diagnostic laparoscopy, as would be unlikely to find any abnormal pathology requiring removal, except for the rare circumstance of an ectopic pregnancy in the omentum or abdominal cavity. I counseled her on the risks of diagnostic laparoscopy as well which include bleeding, infection, damage to surrounding organs including the bowel, bladder, blood vessels or nerves, hernias.  If nothing were found on diagnostic laparoscopy, I could also perform a therapeutic dilation and an aspiration to look for products of conception and confirmed a viable pregnancy.  This is not good option for her due to the desired nature of this pregnancy.    She opts to move forward with #1, observation with close interval follow-up with beta-hCG.  She was clearly and repeatedly counseled on the reason she would need to come back to the emergency room including heavy vaginal bleeding, new severe pain, dizziness, palpitations, shortness of breath, chest pain.  These were to occur she should go to the emergency room for surgical intervention.    Her name, contact information were confirmed.  She is already a patient of [facility], and knows where the clinic location is.    Have sent a message to our nursing team and beta-HCG lead physician about scheduling her beta follow-up.    All questions were answered, and I recommended discharge to ER physician.          Shawnee Ivory MD, FACOG  Urogynecology and Pelvic Reconstructive Surgery  Department of Obstetrics and Gynecology  [facility]        Medical decision making (MDM): 60 minutes total time spent on the date of the encounter:   10 min reviewing records  15 min with the history and physical exam   15 min  spent in direct patient education and counseling   10 min spent in the coordination of care  10 min spent in electronic medical record documentation in the patient's chart.

## 2023-01-26 NOTE — TELEPHONE ENCOUNTER
----- Message from Shawnee Ivory M.D. sent at 1/25/2023  6:12 PM PST -----  Regarding: Beta list - set up follow-up for patient  Good morning,     I just evaluated this patient in the emergency room, and she has a desired pregnancy of unknown location with endometrial fluid (no GS/YS), beta-hCG of 15,000, no vaginal bleeding, benign abdominal/pelvic exam without free fluid or any adnexal masses/cysts on repeat ultrasound.    She is an established patient who was treated for her last pregnancy at [facility], and has [employer] insurance.  A plan was made for her to follow-up in 48 hours for a beta hCG, which would happen at this Friday, 1/27/2023, preferably in the morning so results can be available in the afternoon before the weekend. Fior/Yesenia, would you be able to call her and get this set up?    Since I do not often add people to the beta follow-up list, I am hoping Dr. Odom can help me out with how to get this patient added, and to ensure that she is called and given information about where to get her labs drawn. Also, if she plateaus and needs MTX, I would need some advising as to how this happens in our group.     Thank you!  KF      Called pt and instructed to go to the lab and her Beta Quant labs done tomorrow morning to make sure we have the results by the afternoon. Dr. Ivory will place order for pt. Pt informed to any [facility] as a walk-in and they will have the order in her chart. After results come in we will call pt back with POC. Pt agreed and verbalized understating.

## 2023-01-26 NOTE — ED NOTES
Discharge instructions given to pt including follow up with OB or returning if no improvement of symptoms or to return if worse. Questions answered by RN. Denies any new complaints. Discharged w/stable vitals and able to ambulate to the lobby w/steady gait.

## 2023-01-27 ENCOUNTER — HOSPITAL ENCOUNTER (OUTPATIENT)
Dept: LAB | Facility: MEDICAL CENTER | Age: 23
End: 2023-01-27
Attending: STUDENT IN AN ORGANIZED HEALTH CARE EDUCATION/TRAINING PROGRAM
Payer: COMMERCIAL

## 2023-01-27 ENCOUNTER — TELEPHONE (OUTPATIENT)
Dept: OBGYN | Facility: CLINIC | Age: 23
End: 2023-01-27
Payer: COMMERCIAL

## 2023-01-27 ENCOUNTER — HOSPITAL ENCOUNTER (EMERGENCY)
Facility: MEDICAL CENTER | Age: 23
End: 2023-01-27
Attending: EMERGENCY MEDICINE
Payer: COMMERCIAL

## 2023-01-27 VITALS
HEIGHT: 63 IN | OXYGEN SATURATION: 98 % | RESPIRATION RATE: 18 BRPM | SYSTOLIC BLOOD PRESSURE: 131 MMHG | WEIGHT: 235.67 LBS | TEMPERATURE: 98.6 F | HEART RATE: 66 BPM | DIASTOLIC BLOOD PRESSURE: 66 MMHG | BODY MASS INDEX: 41.76 KG/M2

## 2023-01-27 DIAGNOSIS — O36.80X0 PREGNANCY OF UNKNOWN ANATOMIC LOCATION: ICD-10-CM

## 2023-01-27 DIAGNOSIS — O00.90 ECTOPIC PREGNANCY WITHOUT INTRAUTERINE PREGNANCY, UNSPECIFIED LOCATION: ICD-10-CM

## 2023-01-27 LAB
B-HCG SERPL-ACNC: ABNORMAL MIU/ML (ref 0–5)
BACTERIA UR CULT: NORMAL
SIGNIFICANT IND 70042: NORMAL
SITE SITE: NORMAL
SOURCE SOURCE: NORMAL

## 2023-01-27 PROCEDURE — 700111 HCHG RX REV CODE 636 W/ 250 OVERRIDE (IP): Performed by: EMERGENCY MEDICINE

## 2023-01-27 PROCEDURE — 36415 COLL VENOUS BLD VENIPUNCTURE: CPT

## 2023-01-27 PROCEDURE — 84702 CHORIONIC GONADOTROPIN TEST: CPT

## 2023-01-27 PROCEDURE — 96401 CHEMO ANTI-NEOPL SQ/IM: CPT

## 2023-01-27 PROCEDURE — 99285 EMERGENCY DEPT VISIT HI MDM: CPT

## 2023-01-27 RX ADMIN — METHOTREXATE 54.5 MG: 25 INJECTION, SOLUTION INTRA-ARTERIAL; INTRAMUSCULAR; INTRATHECAL; INTRAVENOUS at 21:10

## 2023-01-27 RX ADMIN — METHOTREXATE 54.5 MG: 25 INJECTION, SOLUTION INTRA-ARTERIAL; INTRAMUSCULAR; INTRATHECAL; INTRAVENOUS at 21:11

## 2023-01-27 ASSESSMENT — FIBROSIS 4 INDEX: FIB4 SCORE: 0.27

## 2023-01-27 NOTE — Clinical Note
Chen Gupta was seen and treated in our emergency department on 1/27/2023.  She may return to work on 01/30/2023.       If you have any questions or concerns, please don't hesitate to call.      Vilma Lucero M.D.

## 2023-01-27 NOTE — TELEPHONE ENCOUNTER
ER follow-up note    This patient is seen in the emergency room 2 days ago with pregnancy of unknown location, no sign of ectopic radiologically, and fluid within the uterus.  She is given counseled on observation versus methotrexate at that time.  Due to this being a desired pregnancy she was given to follow-up in 48 hours for beta.  Her beta-hCG moved from 15 709 to 19,208, which is only 18% rise, which is not consistent with normal intrauterine pregnancy.  Especially at these numbers, we should see a full gestational sac, yolk sac and fetal pole with heart rhythms.    I spoke to her over the phone and counseled her on these results, and my recommendation at this time would be moving forward with methotrexate therapy which would stop so growth and treat the ectopic pregnancy without surgery.  The reason I am not in favor of surgery is due to her lack of adnexal pathology on prior imaging, which is not easily managed when ectopic is not able to be identified.    She had previously labs done including a CBC and CMP with normal renal and liver function tests.  She is an excellent candidate for methotrexate.  I counseled her on the side effects including nausea, photosensitivity.    It was also counseled that the most important thing is her reliable follow-up.  She will require a beta-hCG on day 4 (Tuesday 1/31) and day 7 (Friday 2/3).  For methotrexate to be shown to be effective there should be at least a 50% or greater drop in hCG level between days 4 and 7.  If this does not occur she will require another dose of methotrexate.    Questions were answered, and orders were placed for beta-hCG      18%

## 2023-01-27 NOTE — TELEPHONE ENCOUNTER
Dr. Ivory reviewed pt's Beta HCGs levels and  advsed to call pt and give the option to repeat Beta Quants on Monday with ectopic precautions or pt can go to ER today and get MTX. Per Dr. Ivory if pt decides to repeat blood work to give VB and ectopic precautions.       Called pt and informed as above. Pt states she needs to think about and will call me back today. This RN direct line phone# provided to pt.   1429 pt called back and stated she had more question. Dr. Ivory took the call and spoke to pt. Per Dr. Ivory to call ER and informed pt will come in and she will just need MTX which is base on wt, pt will not need anything else since all of the labs were done when pt was seen there on 1/25/2023.   Pt stated she will be there between 5-6pm today.   1449 Called ER and spoke to Keshawn CORNELL and informed as above. Informed pt will be there today between 5-6. Merrill stated they will take care of her.   1453 called pt back and informed ER is aware she is coming over. Advised to go to ER earlier if possible incase they have any questions they can call Dr. Ivory in the office. Pt agreed and verbalized understanding.

## 2023-01-28 NOTE — PROGRESS NOTES
"Pharmacy Methotrexate for Ectopic Pregnancy Verification  Dx: Ectopic Pregnancy    Methotrexate 50 mg/m2 IM x 1 dose   Pacheco HUSSEIN, \"Clinical Practice. Ectopic Pregnancy,\" N Engl J Med, 2009, 361(4):379-87.    Labs 1/25/23  SCr 0.39  AST/ALT/AP = 14/15/64 Tbili<0.2     01/25/23 12:00   [facility] Only POS     Allergies:Patient has no known allergies.  /62   Pulse 65   Temp 36.8 °C (98.2 °F) (Temporal)   Resp 16   Ht 1.6 m (5' 3\")   Wt 107 kg (235 lb 10.8 oz)   LMP 12/13/2022   SpO2 99%   BMI 41.75 kg/m²   Body surface area is 2.18 meters squared.    Methotrexate 50 mg/m2 x 2.18 m2 = 109 mg  Final Dose = 109 mg IM (54.5 mg x 2 syringes)  Final Volume in syringe using Methotrexate 25 mg/mL injection = 54.5 mg x 2 syringes (2.18 mLs each)   (Split volumes > 3 ml into two syringes)    Sanam Garcia, PharmD, BCOP    "

## 2023-01-28 NOTE — ED PROVIDER NOTES
ER Provider Note    Scribed for Vilma Lucero MD by Zari Pretty. 2023  5:26 PM    Primary Care Provider: Pcp Pt States None    CHIEF COMPLAINT  Chief Complaint   Patient presents with    Miscarriage     Pt seen on Wednesday in the ED for cramping. Pt was told she is about 6-7 weeks pregnant. She was told that there was fluid in her uterus and that it was likely that she is having a miscarriage.  Pt had HCG levels drawn again today at [facility] and was instructed by her OB/GYN, Dr. Ivory, to come to the ER to receive Methotrexate for .  Pt denies vaginal bleeding at this time.     Sent by MD     LIMITATION TO HISTORY   Select: : None    HPI/ROS  OUTSIDE HISTORIAN(S):  None    EXTERNAL RECORDS REVIEWED  Inpatient Notes Patient saw Dr. Ivory (OBGYN). She was sent to the ED today for Methotrexate. She was seen here on the  for cramping. At this time, there was fluid in the uterus and no pregnancy seen. Her initial quant was about 15,000. She followed up with Dr. Ivory (OBMICHAEL) today and her repeat quant was 19,000. Dr. Ivory was worried about an ectopic pregnancy. She is early on in the pregnancy and was prompted to come to the ED for Methotrexate. Patient is Rh+.    Chen Gupta is a 22 y.o. female who presents to the ED per MD onset earlier today. Patient was seen in the ED on Wednesday for cramping. She was told that she was about 6-7 weeks pregnant. She was also told that there was fluid in there uterus, and that she was likely having a miscarriage. Chen followed up with Dr. Ivory (ELVA) today and he prompted her to come to the ED today for Methotrexate due to her repeat quant. She denies dizziness, bleeding, or cramping. No alleviating factors were reported. Patient has one child who is healthy, and this was a normal pregnancy. Not on blood thinners or medical problems.       PAST MEDICAL HISTORY  History reviewed. No pertinent past medical history.    SURGICAL  "HISTORY  History reviewed. No pertinent surgical history.    FAMILY HISTORY  Family History   Problem Relation Age of Onset    No Known Problems Mother     Heart Disease Father     Diabetes Sister        SOCIAL HISTORY   reports that she has never smoked. She has never used smokeless tobacco. She reports that she does not currently use alcohol. She reports that she does not use drugs.    CURRENT MEDICATIONS  Discharge Medication List as of 1/27/2023 10:08 PM        CONTINUE these medications which have NOT CHANGED    Details   COLLAGEN PO Take 1 Tablet by mouth every day., Historical Med      Prenatal Vit-Fe Fumarate-FA (PRENATAL PO) Take 1 Tablet by mouth every day., Historical Med             ALLERGIES  Patient has no known allergies.    PHYSICAL EXAM  /62   Pulse 65   Temp 36.8 °C (98.2 °F) (Temporal)   Resp 16   Ht 1.6 m (5' 3\")   Wt 107 kg (235 lb 10.8 oz)   LMP 12/13/2022   SpO2 99%   BMI 41.75 kg/m²     Constitutional: Well developed, Well nourished, No acute distress, Non-toxic appearance.   HEENT: Normocephalic, Atraumatic,  external ears normal, pharynx pink,  Mucous  Membranes moist, No rhinorrhea or mucosal edema  Eyes: PERRL, EOMI, Conjunctiva normal, No discharge.   Neck: Normal range of motion, No tenderness, Supple, No stridor.   Lymphatic: No lymphadenopathy    Cardiovascular: Regular Rate and Rhythm, No murmurs,  rubs, or gallops.   Thorax & Lungs: Lungs clear to auscultation bilaterally, No respiratory distress, No wheezes, rhales or rhonchi, No chest wall tenderness.   Abdomen: Bowel sounds normal, Soft, non tender, non distended,  No pulsatile masses., no rebound guarding or peritoneal signs.   Skin: Warm, Dry, No erythema, No rash,   Back:  No CVA tenderness,  No spinal tenderness, bony crepitance step offs or instability.   Neurologic: Alert & oriented x 3, Normal motor function, Normal sensory function, No focal deficits noted. Normal reflexes.  Normal Cranial " Nerves.  Extremities: Equal, intact distal pulses, No cyanosis, clubbing or edema,  No tenderness.   Musculoskeletal: Good range of motion in all major joints. No tenderness to palpation or major deformities noted.     COURSE & MEDICAL DECISION MAKING    5:26 PM - Patient seen and evaluated at bedside. Chen Gupta is a 22 y.o. female who presents with miscarriage. Patient will be treated with methotrexate  mg for her symptoms. She understands and agrees to the plan of care.     5:26 PM - Paged Dr. Ivory     5:31 PM -  I discussed the patient's case and the above findings with Dr. Ivory (OBGYN) who stated that he already spoke with the patient. He has her set up with a repeat beta HCG on Tuesday to ensure that the number is going down.      5:37 PM  - Patient was reevaluated at bedside. I informed patient that we will give her a shot of Methotrexate. She should expect some vaginal bleeding like a miscarriage. But if she has heavy bleeding like soaking more than 4 pads in one hour or dizziness, then she should return to the ED. Patient should follow up with Dr. Ivory.     5:45 PM - In terms of patients concern for another pelvic ultrasound, I informed patient that her quant is supposed to double every day. However, it is not. This means that this is not a normal pregnancy and it is not in the correct place. So, an ultrasound will not show anything different from last time.     8:49 PM - Told by pharmacy that the medication was ready at 7PM, but patient has still not been given it.     9:59 PM - Patient was reevaluated at bedside.  It has been an hour following medication administration and she is stable. I advised patient to follow up with Dr. Ivory. I discussed plan for discharge and follow up as outlined below. The patient is stable for discharge at this time and will return for any new or worsening symptoms. Patient verbalizes understanding and support with my plan for discharge.     ED  Observation Status? Yes; I am placing the patient in to an observation status due to a diagnostic uncertainty as well as therapeutic intensity. Patient placed in observation status at 5:41 PM, 1/27/2023.     Observation plan is as follows: Observe for complication with Methotrexate injection.     Upon Reevaluation, the patient's condition has: Improved; and will be discharged.    Patient discharged from ED Observation status at 10:00 PM 1/27/23.      INITIAL ASSESSMENT AND PLAN  Care Narrative: 22 year old female who was seen on the 24th and diagnosed with elevated quantitative HCG with no pregnancy in the uterus. She was sent out for repeat quantitative HCG and it came back today as not doubled but elevated at 19,000. Her findings with discussed with her by Dr. Ivory who recommends she come here for Methotrexate. Patient's case was discussed with Dr. Ivory, and she was given Methotrexate. Patient was then watched in the ED, and she had no adverse symptoms. She is being discharged home with instructions to follow up with Dr. Ivory to repeat HCG next week to ensure her HCG is dropped. I advised patient to return for abdominal pain, vaginal bleeding, dizziness, or any other worsening symptoms.     ADDITIONAL PROBLEM LIST AND DISPOSITION  Differential diagnoses include but are not limited to: Ectopic pregnancy.  No additional problems.      I have discussed management of the patient with the following physicians and CRISELDA's: Dr. Ivory (OBGYN)    Discussion of management with other [facility] [place] or appropriate source(s): Pharmacy Administration and Methotrexate dosing      Escalation of care considered, and ultimately not performed: diagnostic imaging.  In terms of patients concern for another pelvic ultrasound, I informed patient that her quant is supposed to double every day. However, it is not. This means that this is not a normal pregnancy and it is not in the correct place. So, an ultrasound will not show anything different from  last time.     Barriers to care at this time, including but not limited to:  None .     Decision tools and prescription drugs considered including, but not limited to:  None .     The patient will return for new or worsening symptoms and is stable at the time of discharge.    The patient is referred to a primary physician for blood pressure management, diabetic screening, and for all other preventative health concerns.    DISPOSITION:  Patient will be discharged home in stable condition.    FOLLOW UP:  Shawnee Ivory M.D.  [address]  [facility] [address]  [facility] [id number] [phone]    Call in 3 days  for recheck    [facility], Emergency Dept  [address] [facility] [address] [phone]    As needed, If symptoms worsen      FINAL IMPRESSION   1. Ectopic pregnancy without intrauterine pregnancy, unspecified location         Zari SCHAFER ([place]), am scribing for, and in the presence of, Vilma Lucero M.D..    Electronically signed by: Zari Pretty (Henrik), 1/27/2023    Vilma SCHAFER M.D. personally performed the services described in this documentation, as scribed by Zari Pretty in my presence, and it is both accurate and complete.    The note accurately reflects work and decisions made by me.  Vilma Lucero M.D.  1/28/2023  12:17 AM

## 2023-01-28 NOTE — ED NOTES
Pt stating her mother is currently watching her daughter, but she needs to get to work by 2200. Pt was updated on current POC and RN advised of pt situation.

## 2023-01-28 NOTE — ED NOTES
"Pharmacy Methotrexate for Ectopic Pregnancy Calculation Note       Wt 107 kg Ht 63 in  BSA ([place]eller) 2.18 m2    Pertinent Labs:  SCr 0.39  T. Bili <0.2, AST 14, ALT 15  Rh Status: positive (no need for [place])    Dx: Ectopic Pregnancy  Usual dose: Methotrexate 50 mg/m2 IM x 1 dose   Reference:   Pacheco HUSSEIN, \"Clinical Practice. Ectopic Pregnancy,\" N Engl J Med, 2009, 361(4):379-87.      Calculations:  Methotrexate 50 mg/m2 x 2.18 m2 = 109 mg  Final Dose = 109 mg IM   Final Volume in syringe using Methotrexate 25 mg/mL injection = 2.18 mL   (Split volumes > 3 ml into two syringes)    Star Newby, PharmD, BCCCP     "

## 2023-01-28 NOTE — ED TRIAGE NOTES
"Chief Complaint   Patient presents with    Miscarriage     Pt seen on Wednesday in the ED for cramping. Pt was told she is about 6-7 weeks pregnant. She was told that there was fluid in her uterus and that it was likely that she is having a miscarriage.  Pt had HCG levels drawn again today at [facility] and was instructed by her OB/GYN, Dr. Ivory, to come to the ER to received Methotrexate for .  Pt denies vaginal bleeding at this time.     Sent by MD     /62   Pulse 65   Temp 36.8 °C (98.2 °F) (Temporal)   Resp 16   Ht 1.6 m (5' 3\")   Wt 107 kg (235 lb 10.8 oz)   LMP 2022   SpO2 99%   BMI 41.75 kg/m²     Pt ambulatory from lobby with steady gait. Pt denies abdominal cramping at this time.     Pt is alert and oriented, speaking in full sentences, follows commands and responds appropriately to questions. Resp are even and unlabored.      Pt placed in lobby. Pt educated on triage process. Pt encouraged to alert staff for any changes.     Patient and staff wearing appropriate PPE        "

## 2023-01-28 NOTE — ED NOTES
Pt ambulated with a normal, steady gait to the room from the lobby. Pt asked to change into a gown.  Chart up for ERP.

## 2023-01-30 ENCOUNTER — TELEPHONE (OUTPATIENT)
Dept: OBGYN | Facility: CLINIC | Age: 23
End: 2023-01-30
Payer: COMMERCIAL

## 2023-01-30 NOTE — TELEPHONE ENCOUNTER
01/30/23  9:15 AM  Pt called reporting that she had the shot on Friday and still does not have any symptoms. Pt is going to HCG blood draw tomorrow. Pt has an appointment tomorrow with Dr Thakkar that was a DUB. Pt wondering if she should still come in.     11:19 AM  Informed pt of Dr Ivory's advice. Canceled appt for tomorrow. Pt is aware of getting labs today and Friday. Went over if she has unrelenting one sided pain, recurrent dizziness or lightheadedness, go to ER. Pt had one incident of nausea and lightheadedness this weekend but went away. Pt verbalized understanding. She is aware of plan of care. Pt has nurse's direct line for any questions.

## 2023-01-31 ENCOUNTER — HOSPITAL ENCOUNTER (OUTPATIENT)
Dept: LAB | Facility: MEDICAL CENTER | Age: 23
End: 2023-01-31
Attending: STUDENT IN AN ORGANIZED HEALTH CARE EDUCATION/TRAINING PROGRAM
Payer: COMMERCIAL

## 2023-01-31 ENCOUNTER — HOSPITAL ENCOUNTER (OUTPATIENT)
Facility: MEDICAL CENTER | Age: 23
End: 2023-02-01
Attending: EMERGENCY MEDICINE | Admitting: OBSTETRICS & GYNECOLOGY
Payer: COMMERCIAL

## 2023-01-31 ENCOUNTER — APPOINTMENT (OUTPATIENT)
Dept: OBGYN | Facility: CLINIC | Age: 23
End: 2023-01-31
Payer: COMMERCIAL

## 2023-01-31 ENCOUNTER — ANESTHESIA (OUTPATIENT)
Dept: SURGERY | Facility: MEDICAL CENTER | Age: 23
End: 2023-01-31
Payer: COMMERCIAL

## 2023-01-31 ENCOUNTER — TELEPHONE (OUTPATIENT)
Dept: OBGYN | Facility: CLINIC | Age: 23
End: 2023-01-31

## 2023-01-31 ENCOUNTER — APPOINTMENT (OUTPATIENT)
Dept: RADIOLOGY | Facility: MEDICAL CENTER | Age: 23
End: 2023-01-31
Attending: EMERGENCY MEDICINE
Payer: COMMERCIAL

## 2023-01-31 DIAGNOSIS — O00.90 ECTOPIC PREGNANCY WITHOUT INTRAUTERINE PREGNANCY, UNSPECIFIED LOCATION: ICD-10-CM

## 2023-01-31 DIAGNOSIS — O00.102 LEFT TUBAL PREGNANCY WITHOUT INTRAUTERINE PREGNANCY: ICD-10-CM

## 2023-01-31 LAB
ALBUMIN SERPL BCP-MCNC: 3 G/DL (ref 3.2–4.9)
ALBUMIN/GLOB SERPL: 1.1 G/DL
ALP SERPL-CCNC: 63 U/L (ref 30–99)
ALT SERPL-CCNC: 20 U/L (ref 2–50)
ANION GAP SERPL CALC-SCNC: 9 MMOL/L (ref 7–16)
APPEARANCE UR: CLEAR
AST SERPL-CCNC: 14 U/L (ref 12–45)
B-HCG SERPL-ACNC: ABNORMAL MIU/ML (ref 0–5)
B-HCG SERPL-ACNC: ABNORMAL MIU/ML (ref 0–5)
BACTERIA #/AREA URNS HPF: NEGATIVE /HPF
BASOPHILS # BLD AUTO: 0.7 % (ref 0–1.8)
BASOPHILS # BLD: 0.09 K/UL (ref 0–0.12)
BILIRUB SERPL-MCNC: <0.2 MG/DL (ref 0.1–1.5)
BILIRUB UR QL STRIP.AUTO: NEGATIVE
BUN SERPL-MCNC: 12 MG/DL (ref 8–22)
CALCIUM ALBUM COR SERPL-MCNC: 9.6 MG/DL (ref 8.5–10.5)
CALCIUM SERPL-MCNC: 8.8 MG/DL (ref 8.5–10.5)
CHLORIDE SERPL-SCNC: 104 MMOL/L (ref 96–112)
CO2 SERPL-SCNC: 23 MMOL/L (ref 20–33)
COLOR UR: YELLOW
CREAT SERPL-MCNC: 0.45 MG/DL (ref 0.5–1.4)
EOSINOPHIL # BLD AUTO: 0.19 K/UL (ref 0–0.51)
EOSINOPHIL NFR BLD: 1.6 % (ref 0–6.9)
EPI CELLS #/AREA URNS HPF: NORMAL /HPF
ERYTHROCYTE [DISTWIDTH] IN BLOOD BY AUTOMATED COUNT: 39.4 FL (ref 35.9–50)
GFR SERPLBLD CREATININE-BSD FMLA CKD-EPI: 138 ML/MIN/1.73 M 2
GLOBULIN SER CALC-MCNC: 2.7 G/DL (ref 1.9–3.5)
GLUCOSE SERPL-MCNC: 87 MG/DL (ref 65–99)
GLUCOSE UR STRIP.AUTO-MCNC: NEGATIVE MG/DL
HCT VFR BLD AUTO: 41 % (ref 37–47)
HGB BLD-MCNC: 13.9 G/DL (ref 12–16)
HYALINE CASTS #/AREA URNS LPF: NORMAL /LPF
IMM GRANULOCYTES # BLD AUTO: 0.08 K/UL (ref 0–0.11)
IMM GRANULOCYTES NFR BLD AUTO: 0.7 % (ref 0–0.9)
KETONES UR STRIP.AUTO-MCNC: ABNORMAL MG/DL
LEUKOCYTE ESTERASE UR QL STRIP.AUTO: NEGATIVE
LIPASE SERPL-CCNC: 16 U/L (ref 11–82)
LYMPHOCYTES # BLD AUTO: 3.56 K/UL (ref 1–4.8)
LYMPHOCYTES NFR BLD: 29.2 % (ref 22–41)
MCH RBC QN AUTO: 30 PG (ref 27–33)
MCHC RBC AUTO-ENTMCNC: 33.9 G/DL (ref 33.6–35)
MCV RBC AUTO: 88.6 FL (ref 81.4–97.8)
MICRO URNS: ABNORMAL
MONOCYTES # BLD AUTO: 0.85 K/UL (ref 0–0.85)
MONOCYTES NFR BLD AUTO: 7 % (ref 0–13.4)
NEUTROPHILS # BLD AUTO: 7.44 K/UL (ref 2–7.15)
NEUTROPHILS NFR BLD: 60.8 % (ref 44–72)
NITRITE UR QL STRIP.AUTO: NEGATIVE
NRBC # BLD AUTO: 0 K/UL
NRBC BLD-RTO: 0 /100 WBC
NUMBER OF RH DOSES IND 8505RD: NORMAL
PH UR STRIP.AUTO: 5 [PH] (ref 5–8)
PLATELET # BLD AUTO: 319 K/UL (ref 164–446)
PMV BLD AUTO: 11.3 FL (ref 9–12.9)
POTASSIUM SERPL-SCNC: 4 MMOL/L (ref 3.6–5.5)
PROT SERPL-MCNC: 5.7 G/DL (ref 6–8.2)
PROT UR QL STRIP: 100 MG/DL
RBC # BLD AUTO: 4.63 M/UL (ref 4.2–5.4)
RBC # URNS HPF: NORMAL /HPF
RBC UR QL AUTO: NEGATIVE
RH BLD: NORMAL
SODIUM SERPL-SCNC: 136 MMOL/L (ref 135–145)
SP GR UR STRIP.AUTO: 1.03
UROBILINOGEN UR STRIP.AUTO-MCNC: 0.2 MG/DL
WBC # BLD AUTO: 12.2 K/UL (ref 4.8–10.8)
WBC #/AREA URNS HPF: NORMAL /HPF

## 2023-01-31 PROCEDURE — 81001 URINALYSIS AUTO W/SCOPE: CPT

## 2023-01-31 PROCEDURE — 86901 BLOOD TYPING SEROLOGIC RH(D): CPT

## 2023-01-31 PROCEDURE — 700105 HCHG RX REV CODE 258: Performed by: EMERGENCY MEDICINE

## 2023-01-31 PROCEDURE — 76801 OB US < 14 WKS SINGLE FETUS: CPT

## 2023-01-31 PROCEDURE — 99283 EMERGENCY DEPT VISIT LOW MDM: CPT | Mod: 57 | Performed by: OBSTETRICS & GYNECOLOGY

## 2023-01-31 PROCEDURE — 85025 COMPLETE CBC W/AUTO DIFF WBC: CPT

## 2023-01-31 PROCEDURE — 80053 COMPREHEN METABOLIC PANEL: CPT

## 2023-01-31 PROCEDURE — 84702 CHORIONIC GONADOTROPIN TEST: CPT | Mod: 91

## 2023-01-31 PROCEDURE — 84702 CHORIONIC GONADOTROPIN TEST: CPT

## 2023-01-31 PROCEDURE — 99291 CRITICAL CARE FIRST HOUR: CPT

## 2023-01-31 PROCEDURE — 83690 ASSAY OF LIPASE: CPT

## 2023-01-31 PROCEDURE — 36415 COLL VENOUS BLD VENIPUNCTURE: CPT

## 2023-01-31 RX ORDER — SODIUM CHLORIDE 9 MG/ML
1000 INJECTION, SOLUTION INTRAVENOUS ONCE
Status: COMPLETED | OUTPATIENT
Start: 2023-01-31 | End: 2023-01-31

## 2023-01-31 RX ADMIN — SODIUM CHLORIDE 1000 ML: 9 INJECTION, SOLUTION INTRAVENOUS at 23:15

## 2023-01-31 ASSESSMENT — FIBROSIS 4 INDEX: FIB4 SCORE: 0.27

## 2023-01-31 NOTE — TELEPHONE ENCOUNTER
01/31/23  2:57 PM  I called pt in response to KeyMe message. Pt received MTX 4 days ago. The last few days she reports increased lightheadedness, getting winded and pelvic pain. Her pelvic pain is constant pressure. She believes it is tolerable. Sometimes she will get a sharp and shooting on the right side. This sharp pain will stop her in her tracks and make her jump. Denies any aggravating factors. Pain happens randomly. Denies any alleviating factors.  Denies any vaginal bleeding or vomiting.     4:12 PM  Spoke with Dr Odom and recommended patient go to ER.     4:13 PM  Informed patient she is going to go to ER in next hour.

## 2023-02-01 ENCOUNTER — ANESTHESIA EVENT (OUTPATIENT)
Dept: SURGERY | Facility: MEDICAL CENTER | Age: 23
End: 2023-02-01
Payer: COMMERCIAL

## 2023-02-01 VITALS
HEART RATE: 69 BPM | RESPIRATION RATE: 20 BRPM | OXYGEN SATURATION: 96 % | BODY MASS INDEX: 41.33 KG/M2 | TEMPERATURE: 98 F | WEIGHT: 233.25 LBS | HEIGHT: 63 IN | DIASTOLIC BLOOD PRESSURE: 55 MMHG | SYSTOLIC BLOOD PRESSURE: 103 MMHG

## 2023-02-01 PROBLEM — O00.90 ECTOPIC PREGNANCY WITHOUT INTRAUTERINE PREGNANCY: Status: ACTIVE | Noted: 2023-02-01

## 2023-02-01 LAB — PATHOLOGY CONSULT NOTE: NORMAL

## 2023-02-01 PROCEDURE — 700111 HCHG RX REV CODE 636 W/ 250 OVERRIDE (IP): Performed by: ANESTHESIOLOGY

## 2023-02-01 PROCEDURE — 700101 HCHG RX REV CODE 250: Performed by: OBSTETRICS & GYNECOLOGY

## 2023-02-01 PROCEDURE — 160002 HCHG RECOVERY MINUTES (STAT): Performed by: OBSTETRICS & GYNECOLOGY

## 2023-02-01 PROCEDURE — 160048 HCHG OR STATISTICAL LEVEL 1-5: Performed by: OBSTETRICS & GYNECOLOGY

## 2023-02-01 PROCEDURE — 700105 HCHG RX REV CODE 258: Performed by: ANESTHESIOLOGY

## 2023-02-01 PROCEDURE — 160036 HCHG PACU - EA ADDL 30 MINS PHASE I: Performed by: OBSTETRICS & GYNECOLOGY

## 2023-02-01 PROCEDURE — 160029 HCHG SURGERY MINUTES - 1ST 30 MINS LEVEL 4: Performed by: OBSTETRICS & GYNECOLOGY

## 2023-02-01 PROCEDURE — 160009 HCHG ANES TIME/MIN: Performed by: OBSTETRICS & GYNECOLOGY

## 2023-02-01 PROCEDURE — 160041 HCHG SURGERY MINUTES - EA ADDL 1 MIN LEVEL 4: Performed by: OBSTETRICS & GYNECOLOGY

## 2023-02-01 PROCEDURE — 59151 TREAT ECTOPIC PREGNANCY: CPT | Performed by: OBSTETRICS & GYNECOLOGY

## 2023-02-01 PROCEDURE — 700101 HCHG RX REV CODE 250: Performed by: ANESTHESIOLOGY

## 2023-02-01 PROCEDURE — 00840 ANES IPER PX LOWER ABD NOS: CPT | Performed by: ANESTHESIOLOGY

## 2023-02-01 PROCEDURE — 99140 ANES COMP EMERGENCY COND: CPT | Performed by: ANESTHESIOLOGY

## 2023-02-01 PROCEDURE — 160035 HCHG PACU - 1ST 60 MINS PHASE I: Performed by: OBSTETRICS & GYNECOLOGY

## 2023-02-01 PROCEDURE — 88305 TISSUE EXAM BY PATHOLOGIST: CPT

## 2023-02-01 RX ORDER — ONDANSETRON 2 MG/ML
INJECTION INTRAMUSCULAR; INTRAVENOUS PRN
Status: DISCONTINUED | OUTPATIENT
Start: 2023-02-01 | End: 2023-02-01 | Stop reason: SURG

## 2023-02-01 RX ORDER — KETOROLAC TROMETHAMINE 30 MG/ML
INJECTION, SOLUTION INTRAMUSCULAR; INTRAVENOUS PRN
Status: DISCONTINUED | OUTPATIENT
Start: 2023-02-01 | End: 2023-02-01 | Stop reason: SURG

## 2023-02-01 RX ORDER — HALOPERIDOL 5 MG/ML
1 INJECTION INTRAMUSCULAR
Status: DISCONTINUED | OUTPATIENT
Start: 2023-02-01 | End: 2023-02-01 | Stop reason: HOSPADM

## 2023-02-01 RX ORDER — CEFAZOLIN SODIUM 1 G/3ML
INJECTION, POWDER, FOR SOLUTION INTRAMUSCULAR; INTRAVENOUS PRN
Status: DISCONTINUED | OUTPATIENT
Start: 2023-02-01 | End: 2023-02-01 | Stop reason: SURG

## 2023-02-01 RX ORDER — IBUPROFEN 600 MG/1
600 TABLET ORAL EVERY 6 HOURS PRN
Qty: 30 TABLET | Refills: 1 | Status: SHIPPED | OUTPATIENT
Start: 2023-02-01

## 2023-02-01 RX ORDER — DIPHENHYDRAMINE HYDROCHLORIDE 50 MG/ML
12.5 INJECTION INTRAMUSCULAR; INTRAVENOUS
Status: DISCONTINUED | OUTPATIENT
Start: 2023-02-01 | End: 2023-02-01 | Stop reason: HOSPADM

## 2023-02-01 RX ORDER — OXYCODONE HYDROCHLORIDE 5 MG/1
5 TABLET ORAL EVERY 8 HOURS PRN
Qty: 6 TABLET | Refills: 0 | Status: SHIPPED | OUTPATIENT
Start: 2023-02-01 | End: 2023-02-03

## 2023-02-01 RX ORDER — LIDOCAINE HYDROCHLORIDE 20 MG/ML
INJECTION, SOLUTION EPIDURAL; INFILTRATION; INTRACAUDAL; PERINEURAL PRN
Status: DISCONTINUED | OUTPATIENT
Start: 2023-02-01 | End: 2023-02-01 | Stop reason: SURG

## 2023-02-01 RX ORDER — ONDANSETRON 2 MG/ML
4 INJECTION INTRAMUSCULAR; INTRAVENOUS
Status: DISCONTINUED | OUTPATIENT
Start: 2023-02-01 | End: 2023-02-01 | Stop reason: HOSPADM

## 2023-02-01 RX ORDER — SODIUM CHLORIDE, SODIUM LACTATE, POTASSIUM CHLORIDE, CALCIUM CHLORIDE 600; 310; 30; 20 MG/100ML; MG/100ML; MG/100ML; MG/100ML
INJECTION, SOLUTION INTRAVENOUS
Status: DISCONTINUED | OUTPATIENT
Start: 2023-02-01 | End: 2023-02-01 | Stop reason: SURG

## 2023-02-01 RX ORDER — DEXAMETHASONE SODIUM PHOSPHATE 4 MG/ML
INJECTION, SOLUTION INTRA-ARTICULAR; INTRALESIONAL; INTRAMUSCULAR; INTRAVENOUS; SOFT TISSUE PRN
Status: DISCONTINUED | OUTPATIENT
Start: 2023-02-01 | End: 2023-02-01 | Stop reason: SURG

## 2023-02-01 RX ORDER — BUPIVACAINE HYDROCHLORIDE AND EPINEPHRINE 5; 5 MG/ML; UG/ML
INJECTION, SOLUTION EPIDURAL; INTRACAUDAL; PERINEURAL
Status: DISCONTINUED | OUTPATIENT
Start: 2023-02-01 | End: 2023-02-01 | Stop reason: HOSPADM

## 2023-02-01 RX ADMIN — PROPOFOL 200 MG: 10 INJECTION, EMULSION INTRAVENOUS at 00:36

## 2023-02-01 RX ADMIN — SUGAMMADEX 200 MG: 100 INJECTION, SOLUTION INTRAVENOUS at 01:28

## 2023-02-01 RX ADMIN — CEFAZOLIN 1 G: 330 INJECTION, POWDER, FOR SOLUTION INTRAMUSCULAR; INTRAVENOUS at 00:36

## 2023-02-01 RX ADMIN — SODIUM CHLORIDE, POTASSIUM CHLORIDE, SODIUM LACTATE AND CALCIUM CHLORIDE: 600; 310; 30; 20 INJECTION, SOLUTION INTRAVENOUS at 00:30

## 2023-02-01 RX ADMIN — DEXAMETHASONE SODIUM PHOSPHATE 4 MG: 4 INJECTION, SOLUTION INTRA-ARTICULAR; INTRALESIONAL; INTRAMUSCULAR; INTRAVENOUS; SOFT TISSUE at 01:28

## 2023-02-01 RX ADMIN — ROCURONIUM BROMIDE 50 MG: 10 INJECTION, SOLUTION INTRAVENOUS at 00:36

## 2023-02-01 RX ADMIN — LIDOCAINE HYDROCHLORIDE 100 MG: 20 INJECTION, SOLUTION EPIDURAL; INFILTRATION; INTRACAUDAL at 00:36

## 2023-02-01 RX ADMIN — KETOROLAC TROMETHAMINE 30 MG: 30 INJECTION, SOLUTION INTRAMUSCULAR at 01:24

## 2023-02-01 RX ADMIN — ONDANSETRON 4 MG: 2 INJECTION INTRAMUSCULAR; INTRAVENOUS at 01:24

## 2023-02-01 ASSESSMENT — PAIN DESCRIPTION - PAIN TYPE
TYPE: SURGICAL PAIN

## 2023-02-01 NOTE — DISCHARGE INSTRUCTIONS
If any questions arise, call your provider.  If your provider is not available, please feel free to call the Surgical Center at [phone].    MEDICATIONS: Resume taking daily medication.  Take prescribed pain medication with food.  If no medication is prescribed, you may take non-aspirin pain medication if needed.  PAIN MEDICATION CAN BE VERY CONSTIPATING.  Take a stool softener or laxative such as senokot, pericolace, or milk of magnesia if needed.    Last pain medication given at     What to Expect Post Anesthesia    Rest and take it easy for the first 24 hours.  A responsible adult is recommended to remain with you during that time.  It is normal to feel sleepy.  We encourage you to not do anything that requires balance, judgment or coordination.    FOR 24 HOURS DO NOT:  Drive, operate machinery or run household appliances.  Drink beer or alcoholic beverages.  Make important decisions or sign legal documents.    To avoid nausea, slowly advance diet as tolerated, avoiding spicy or greasy foods for the first day.  Add more substantial food to your diet according to your provider's instructions.  Babies can be fed formula or breast milk as soon as they are hungry.  INCREASE FLUIDS AND FIBER TO AVOID CONSTIPATION.    MILD FLU-LIKE SYMPTOMS ARE NORMAL.  YOU MAY EXPERIENCE GENERALIZED MUSCLE ACHES, THROAT IRRITATION, HEADACHE AND/OR SOME NAUSEA.

## 2023-02-01 NOTE — ANESTHESIA POSTPROCEDURE EVALUATION
Patient: Chen Gupta    Procedure Summary     Date: 02/01/23 Room / Location: [place] [address] / SURGERY [facility]    Anesthesia Start: 0030 Anesthesia Stop: 0143    Procedures:       PELVISCOPY FOR RUPTURED ECTOPIC (Left: Abdomen)      SALPINGECTOMY (Left: Abdomen) Diagnosis: (RUPTURED ECTOPIC PREGNANCY)    Surgeons: Martha Jacobo D.O. Responsible Provider: Sb Lopez M.D.    Anesthesia Type: general ASA Status: 2 - Emergent          Final Anesthesia Type: general  Last vitals  BP   Blood Pressure: 112/52    Temp   36.4 °C (97.6 °F)    Pulse   84   Resp   20    SpO2   100 %      Anesthesia Post Evaluation    Patient location during evaluation: PACU  Patient participation: complete - patient participated  Level of consciousness: awake and alert    Airway patency: patent  Anesthetic complications: no  Cardiovascular status: hemodynamically stable  Respiratory status: acceptable  Hydration status: euvolemic    PONV: none          There were no known notable events for this encounter.     Nurse Pain Score: 0 (NPRS)

## 2023-02-01 NOTE — OR NURSING
Patient arrived from OR via gurney at 0136 with siderails up x 2, brake locked upon arrival. Oral airway in place. Received report from Zhanna MONTOYA RN and Dr. Lopez, assumed care of patient. Three abdominal trocar sites with Dermabond, CDI. Katie pad in place, CDI Patient rouses to voice at 0146, oral airway removed. Patient oriented x 4 at 0155, remains drowsy. Denies pain or nausea.  updated at 0220. Patient reports pain tolerable at 2/10, refuses narcotics. Vital signs stable. Patient ambulated to restroom and able to void at 0305. Meeting criteria for discharge at 0315. Katie pad and abdominal surgical sites remain CDI. Patient continues to report pain tolerable.  Patient able to assist with dressing self. Discharge instructions given, copy to patient. All concerns and questions answered/addressed. IV dc'd tip intact at 0352. Patient discharged home via wheelchair.

## 2023-02-01 NOTE — ANESTHESIA PROCEDURE NOTES
Airway    Date/Time: 2/1/2023 12:36 AM  Performed by: Sb Lopez M.D.  Authorized by: Sb Lopez M.D.     Location:  OR  Urgency:  Elective  Indications for Airway Management:  Anesthesia      Spontaneous Ventilation: absent    Sedation Level:  Deep  Preoxygenated: Yes    Patient Position:  Sniffing  Final Airway Type:  Endotracheal airway  Final Endotracheal Airway:  ETT  Cuffed: Yes    Technique Used for Successful ETT Placement:  Direct laryngoscopy    Insertion Site:  Oral  Blade Type:  Lara  Laryngoscope Blade/Videolaryngoscope Blade Size:  2  ETT Size (mm):  7.0  Measured from:  Teeth  ETT to Teeth (cm):  22  Placement Verified by: auscultation and capnometry    Cormack-Lehane Classification:  Grade I - full view of glottis  Number of Attempts at Approach:  1

## 2023-02-01 NOTE — ANESTHESIA PREPROCEDURE EVALUATION
Case: [id number] Date/Time: 01/31/23 2255    Procedures:       PELVISCOPY FOR ECTOPIC      SALPINGECTOMY (Left: Abdomen)    Anesthesia type: General    Location: [facility] OR  / SURGERY [facility]    Surgeons: Martha Jacobo D.O.        Ectopic pregnancy  Relevant Problems   No relevant active problems       Physical Exam    Airway   Mallampati: II  TM distance: >3 FB  Neck ROM: full       Cardiovascular - normal exam  Rhythm: regular  Rate: normal  (-) murmur     Dental - normal exam           Pulmonary - normal exam  Breath sounds clear to auscultation     Abdominal    Neurological - normal exam                 Anesthesia Plan    ASA 2- EMERGENT       Plan - general       Airway plan will be ETT          Induction: intravenous      Pertinent diagnostic labs and testing reviewed    Informed Consent:    Anesthetic plan and risks discussed with patient.

## 2023-02-01 NOTE — CONSULTS
ED Gyn Consultation Note      Consultation performed on behalf of Dr. Huitron    Chief Complaint   Patient presents with    Sent from Urgent Care     Had HCG taken today, and was told to come in for an ultrasound.    Lightheadedness     With SOB    Abdominal Pain     Shooting pain from the uterus up the abdomen.  Denies any vaginal bleeding since last dose of methotrexate      Chen Gupta is a 22 y.o. female presents with pregnancy of unknown origin and increasing pain. She was seen on  23 and diagnosed with an ectopic pregnancy. She was treated with methotrexate and is being followed outpatient. Her HCG levels ramy and she has had increasing pain. Denies fevers/chills/syncope.       Review of systems:  Pertinent positives documented in HPI and a comprehensive review of system is negative     Gyn history:   Denies STI, denies abnormal pap smear      History reviewed. No pertinent past medical history.  History reviewed. No pertinent surgical history.  OB History    Para Term  AB Living   2 1 1 0 0 1   SAB IAB Ectopic Molar Multiple Live Births   0 0 0 0 0 1      # Outcome Date GA Lbr Power/2nd Weight Sex Delivery Anes PTL Lv   2 Current            1 Term 21 37w1d 06:29 / 00:35 2.81 kg (6 lb 3.1 oz) F Vag-Spont EPI N JAMES     Social History     Socioeconomic History    Marital status:      Spouse name: Not on file    Number of children: Not on file    Years of education: Not on file    Highest education level: Not on file   Occupational History    Not on file   Tobacco Use    Smoking status: Never    Smokeless tobacco: Never   Vaping Use    Vaping Use: Never used   Substance and Sexual Activity    Alcohol use: Not Currently    Drug use: No    Sexual activity: Yes     Partners: Male     Birth control/protection: None   Other Topics Concern    Behavioral problems Not Asked    Interpersonal relationships Not Asked    Sad or not enjoying activities Not Asked    Suicidal  "thoughts Not Asked    Poor school performance Not Asked    Reading difficulties Not Asked    Speech difficulties Not Asked    Writing difficulties Not Asked    Inadequate sleep Not Asked    Excessive TV viewing Not Asked    Excessive video game use Not Asked    Inadequate exercise Not Asked    Sports related Not Asked    Poor diet Not Asked    Family concerns for drug/alcohol abuse Not Asked    Poor oral hygiene Not Asked    Bike safety Not Asked    Family concerns vehicle safety Not Asked   Social History Narrative    Not on file     Social Determinants of Health     Financial Resource Strain: Not on file   Food Insecurity: Not on file   Transportation Needs: Not on file   Physical Activity: Not on file   Stress: Not on file   Social Connections: Not on file   Intimate Partner Violence: Not on file   Housing Stability: Not on file     Allergies: Patient has no known allergies.  No current facility-administered medications for this encounter.    Current Outpatient Medications:     COLLAGEN PO, Take 1 Tablet by mouth every day., Disp: , Rfl:     Prenatal Vit-Fe Fumarate-FA (PRENATAL PO), Take 1 Tablet by mouth every day., Disp: , Rfl:     Pt with following problems:  Patient Active Problem List    Diagnosis Date Noted    Supervision of normal first pregnancy, antepartum 06/24/2021    Obesity affecting pregnancy in third trimester 04/01/2021    BMI (body mass index), pediatric 95-99% for age, obese child structured weight management/multidisciplinary intervention category 03/09/2016    Acanthosis nigricans 03/09/2016         Objective:      /68   Pulse 69   Temp 36.4 °C (97.5 °F) (Temporal)   Resp 14   Ht 1.6 m (5' 3\")   Wt 106 kg (233 lb 4 oz)   SpO2 98%     General:   no acute distress, alert, cooperative   Skin:   normal, no rashes   HEENT:  PERRLA and EOMI   Lungs:   normal respiratory effort   Heart:   RRR       Abdomen:   Tenderness in the LLQ, no rebound or guarding   EXT:  NT, no edema     Lab " Review  Lab:    Recent Results (from the past 2016 hour(s))   CBC WITH DIFFERENTIAL    Collection Time: 01/25/23 12:00 PM   Result Value Ref Range    WBC 12.6 (H) 4.8 - 10.8 K/uL    RBC 4.77 4.20 - 5.40 M/uL    Hemoglobin 14.5 12.0 - 16.0 g/dL    Hematocrit 42.3 37.0 - 47.0 %    MCV 88.7 81.4 - 97.8 fL    MCH 30.4 27.0 - 33.0 pg    MCHC 34.3 33.6 - 35.0 g/dL    RDW 41.1 35.9 - 50.0 fL    Platelet Count 313 164 - 446 K/uL    MPV 11.2 9.0 - 12.9 fL    Neutrophils-Polys 75.20 (H) 44.00 - 72.00 %    Lymphocytes 15.20 (L) 22.00 - 41.00 %    Monocytes 7.10 0.00 - 13.40 %    Eosinophils 1.00 0.00 - 6.90 %    Basophils 0.70 0.00 - 1.80 %    Immature Granulocytes 0.80 0.00 - 0.90 %    Nucleated RBC 0.00 /100 WBC    Neutrophils (Absolute) 9.47 (H) 2.00 - 7.15 K/uL    Lymphs (Absolute) 1.91 1.00 - 4.80 K/uL    Monos (Absolute) 0.90 (H) 0.00 - 0.85 K/uL    Eos (Absolute) 0.12 0.00 - 0.51 K/uL    Baso (Absolute) 0.09 0.00 - 0.12 K/uL    Immature Granulocytes (abs) 0.10 0.00 - 0.11 K/uL    NRBC (Absolute) 0.00 K/uL   COMP METABOLIC PANEL    Collection Time: 01/25/23 12:00 PM   Result Value Ref Range    Sodium 137 135 - 145 mmol/L    Potassium 3.9 3.6 - 5.5 mmol/L    Chloride 108 96 - 112 mmol/L    Co2 20 20 - 33 mmol/L    Anion Gap 9.0 7.0 - 16.0    Glucose 82 65 - 99 mg/dL    Bun 10 8 - 22 mg/dL    Creatinine 0.39 (L) 0.50 - 1.40 mg/dL    Calcium 8.4 (L) 8.5 - 10.5 mg/dL    AST(SGOT) 14 12 - 45 U/L    ALT(SGPT) 15 2 - 50 U/L    Alkaline Phosphatase 64 30 - 99 U/L    Total Bilirubin <0.2 0.1 - 1.5 mg/dL    Albumin 2.8 (L) 3.2 - 4.9 g/dL    Total Protein 5.5 (L) 6.0 - 8.2 g/dL    Globulin 2.7 1.9 - 3.5 g/dL    A-G Ratio 1.0 g/dL   LIPASE    Collection Time: 01/25/23 12:00 PM   Result Value Ref Range    Lipase 20 11 - 82 U/L   HCG QUAL SERUM    Collection Time: 01/25/23 12:00 PM   Result Value Ref Range    Beta-Hcg Qualitative Serum Positive (A) Negative   COD (ADULT)    Collection Time: 01/25/23 12:00 PM   Result Value Ref Range     ABO Grouping Only O     Rh Grouping Only POS     Antibody Screen-Cod NEG    PROTHROMBIN TIME    Collection Time: 01/25/23 12:00 PM   Result Value Ref Range    PT 13.9 12.0 - 14.6 sec    INR 1.08 0.87 - 1.13   APTT    Collection Time: 01/25/23 12:00 PM   Result Value Ref Range    APTT 27.8 24.7 - 36.0 sec   CORRECTED CALCIUM    Collection Time: 01/25/23 12:00 PM   Result Value Ref Range    Correct Calcium 9.4 8.5 - 10.5 mg/dL   ESTIMATED GFR    Collection Time: 01/25/23 12:00 PM   Result Value Ref Range    GFR (CKD-EPI) 143 >60 mL/min/1.73 m 2   HCG QUANTITATIVE SERUM    Collection Time: 01/25/23 12:00 PM   Result Value Ref Range    Bhcg 14297.0 (H) 0.0 - 5.0 mIU/mL   RH TYPE FOR RHOGAM FROM E.D.    Collection Time: 01/25/23 12:00 PM   Result Value Ref Range    Number Of Rh Doses Indicated ZERO    URINALYSIS,CULTURE IF INDICATED    Collection Time: 01/25/23  1:09 PM    Specimen: Urine   Result Value Ref Range    Color Yellow     Character Clear     Specific Gravity 1.018 <1.035    Ph 6.5 5.0 - 8.0    Glucose Negative Negative mg/dL    Ketones Negative Negative mg/dL    Protein 300 (A) Negative mg/dL    Bilirubin Negative Negative    Urobilinogen, Urine 0.2 Negative    Nitrite Negative Negative    Leukocyte Esterase Negative Negative    Occult Blood Negative Negative    Micro Urine Req Microscopic    URINE MICROSCOPIC (W/UA)    Collection Time: 01/25/23  1:09 PM   Result Value Ref Range    WBC 2-5 /hpf    RBC 2-5 (A) /hpf    Bacteria Few (A) None /hpf    Epithelial Cells Few /hpf    Mucous Threads Many /hpf    Hyaline Cast 0-2 /lpf   URINE CULTURE-EXISTING-LESS THAN 48 HOURS    Collection Time: 01/25/23  1:09 PM    Specimen: Urine, Clean Catch   Result Value Ref Range    Significant Indicator NEG     Source UR     Site URINE, CLEAN CATCH     Culture Result Usual urogenital nico 10-50,000 cfu/mL    WET PREP    Collection Time: 01/25/23  2:55 PM    Specimen: Vaginal; Genital   Result Value Ref Range    Significant  Indicator NEG     Source GEN     Site VAGINAL     Wet Prep For Parasites       No yeast.  No motile Trichomonas seen.  No clue cells seen.     Chlamydia/GC, PCR (Genital/Anal swab)    Collection Time: 01/25/23  2:55 PM    Specimen: Genital   Result Value Ref Range    C. trachomatis by PCR Negative Negative    N. gonorrhoeae by PCR Negative Negative    Source Vaginal    HCG QUANTITATIVE    Collection Time: 01/27/23 11:21 AM   Result Value Ref Range    Bhcg 09145.0 (H) 0.0 - 5.0 mIU/mL   HCG QUANTITATIVE    Collection Time: 01/31/23 12:43 PM   Result Value Ref Range    Bhcg 65152.0 (H) 0.0 - 5.0 mIU/mL   CBC WITH DIFFERENTIAL    Collection Time: 01/31/23  8:32 PM   Result Value Ref Range    WBC 12.2 (H) 4.8 - 10.8 K/uL    RBC 4.63 4.20 - 5.40 M/uL    Hemoglobin 13.9 12.0 - 16.0 g/dL    Hematocrit 41.0 37.0 - 47.0 %    MCV 88.6 81.4 - 97.8 fL    MCH 30.0 27.0 - 33.0 pg    MCHC 33.9 33.6 - 35.0 g/dL    RDW 39.4 35.9 - 50.0 fL    Platelet Count 319 164 - 446 K/uL    MPV 11.3 9.0 - 12.9 fL    Neutrophils-Polys 60.80 44.00 - 72.00 %    Lymphocytes 29.20 22.00 - 41.00 %    Monocytes 7.00 0.00 - 13.40 %    Eosinophils 1.60 0.00 - 6.90 %    Basophils 0.70 0.00 - 1.80 %    Immature Granulocytes 0.70 0.00 - 0.90 %    Nucleated RBC 0.00 /100 WBC    Neutrophils (Absolute) 7.44 (H) 2.00 - 7.15 K/uL    Lymphs (Absolute) 3.56 1.00 - 4.80 K/uL    Monos (Absolute) 0.85 0.00 - 0.85 K/uL    Eos (Absolute) 0.19 0.00 - 0.51 K/uL    Baso (Absolute) 0.09 0.00 - 0.12 K/uL    Immature Granulocytes (abs) 0.08 0.00 - 0.11 K/uL    NRBC (Absolute) 0.00 K/uL   COMP METABOLIC PANEL    Collection Time: 01/31/23  8:32 PM   Result Value Ref Range    Sodium 136 135 - 145 mmol/L    Potassium 4.0 3.6 - 5.5 mmol/L    Chloride 104 96 - 112 mmol/L    Co2 23 20 - 33 mmol/L    Anion Gap 9.0 7.0 - 16.0    Glucose 87 65 - 99 mg/dL    Bun 12 8 - 22 mg/dL    Creatinine 0.45 (L) 0.50 - 1.40 mg/dL    Calcium 8.8 8.5 - 10.5 mg/dL    AST(SGOT) 14 12 - 45 U/L     ALT(SGPT) 20 2 - 50 U/L    Alkaline Phosphatase 63 30 - 99 U/L    Total Bilirubin <0.2 0.1 - 1.5 mg/dL    Albumin 3.0 (L) 3.2 - 4.9 g/dL    Total Protein 5.7 (L) 6.0 - 8.2 g/dL    Globulin 2.7 1.9 - 3.5 g/dL    A-G Ratio 1.1 g/dL   LIPASE    Collection Time: 23  8:32 PM   Result Value Ref Range    Lipase 16 11 - 82 U/L   HCG QUANTITATIVE    Collection Time: 23  8:32 PM   Result Value Ref Range    Bhcg 49920.0 (H) 0.0 - 5.0 mIU/mL   CORRECTED CALCIUM    Collection Time: 23  8:32 PM   Result Value Ref Range    Correct Calcium 9.6 8.5 - 10.5 mg/dL   ESTIMATED GFR    Collection Time: 23  8:32 PM   Result Value Ref Range    GFR (CKD-EPI) 138 >60 mL/min/1.73 m 2   RH TYPE FOR RHOGAM FROM E.D.    Collection Time: 23  8:32 PM   Result Value Ref Range    Emergency Department Rh Typing POS     Number Of Rh Doses Indicated ZERO    URINALYSIS,CULTURE IF INDICATED    Collection Time: 23  8:57 PM    Specimen: Urine, Clean Catch   Result Value Ref Range    Color Yellow     Character Clear     Specific Gravity 1.026 <1.035    Ph 5.0 5.0 - 8.0    Glucose Negative Negative mg/dL    Ketones Trace (A) Negative mg/dL    Protein 100 (A) Negative mg/dL    Bilirubin Negative Negative    Urobilinogen, Urine 0.2 Negative    Nitrite Negative Negative    Leukocyte Esterase Negative Negative    Occult Blood Negative Negative    Micro Urine Req Microscopic    URINE MICROSCOPIC (W/UA)    Collection Time: 23  8:57 PM   Result Value Ref Range    WBC 0-2 /hpf    RBC 0-2 /hpf    Bacteria Negative None /hpf    Epithelial Cells Few /hpf    Hyaline Cast 0-2 /lpf        Assessment:   22 y.o.  here with ectopic pregnancy     Plan:   #  Ectopic pregnancy now with cardiac activity. Discussed recommendation for urgent surgery for laparoscopic left salpingectomy, possible salpingotomy and all indicated procedures. Discussed risks of procedure including bleeding, infection, and injury to surrounding organs like  bowel bladder and ureters. She expresses understanding and consents.

## 2023-02-01 NOTE — OP REPORT
PreOp Diagnosis: 1. Left sided ectopic pregnancy affecting the left fallopian tube.      PostOp Diagnosis: same      Procedure(s):  PELVISCOPY FOR ECTOPIC - Wound Class: Clean  SALPINGECTOMY - Wound Class: Clean    Surgeon(s):  Martha Jacobo D.O.    Anesthesiologist/Type of Anesthesia:  Anesthesiologist: Sb Lopez M.D./General    Surgical Staff:  Circulator: Zhanna Edwards R.N.  Scrub Person: Mayco Marlow    Specimens removed if any:  ID Type Source Tests Collected by Time Destination   A : LEFT FALLOPIAN TUBE Tissue Abdominal PATHOLOGY SPECIMEN Martha Jacobo D.O. 2/1/2023  1:07 AM        Estimated Blood Loss: minimal    Findings: ectopic pregnancy obvious affecting all of the length of the left fallopian tube. Normal apearing right fallopian tube. Normal appearing ovaries bilaterally.     Complications: none      Operative Report        PROCEDURE:  The patient was taken to the operating room where general    endotracheal anesthesia was applied.  The patient was placed in the dorsal    lithotomy position with Adolph stirrups. The patient was prepped and draped in   the normal sterile fashion.  The cervix was visualized using a weighted speculum and right angle rectractor. The acorn manipulator was then inserted without any difficulty as a means to manipulate the cervix.     The procedure went towards the abdomen. Marcaine was injected into the    umbilicus and an 5 mm incision was made at the umbilicus.  Fascia was grasped with a kocher clamp and elevated and the veress needle was inserted into the abdomen. Placement was confirmed using a water filled syringe and low pressure with low flow gas. Pneumoperitoneum was established with CO2 gas to a pressure of 15 mmHg.  The visiport was introduced into the peritoneal cavity without difficulty. Intraperiotneal placement was confirmed with visualization. No damage to bowel was noted at this time.        2 additional laparoscopic ports were inserted into the  patient's left  abdominal quadrant. This was done by infiltration with Marcaine, incising the skin with a scalpel, and insertion of 5 mm ports under direct laparoscopic visualization. The ligasure device was then introduced into the left lower quadrant port and with the assistance of an atraumatic grasper, the left fallopian tube was elevated and the mesosalpinx was coagulated and cut to the level of the isthmus. The tube and ectopic were placed in a 5 mm endocatch bag and removed through the port. The skin and fascia were extended in order to allow for the removal of the tube and ectopic pregnancy to approximately 8 mm. The ovaries were then examined again and noted to be within normal    limits.  At this point, the CO2 gas was released.  There was no bleeding noted   at the operative sites or anywhere else in the abdomen.     All instruments were removed from the abdomen. The skin was    reapproximated with 4-0 Monocryl subcuticular stitch.  Sponge, needle,    instrument, and lap counts were correct x2.  Patient tolerated the procedure    well and went to recovery room in stable condition.

## 2023-02-01 NOTE — OR SURGEON
Immediate Post OP Note    PreOp Diagnosis: 1. Left sided ectopic pregnancy affecting the left fallopian tube.      PostOp Diagnosis: same      Procedure(s):  PELVISCOPY FOR ECTOPIC - Wound Class: Clean  SALPINGECTOMY - Wound Class: Clean    Surgeon(s):  Martha Jacobo D.O.    Anesthesiologist/Type of Anesthesia:  Anesthesiologist: Sb Lopez M.D./General    Surgical Staff:  Circulator: Zhanna Edwards R.N.  Scrub Person: Mayco Marlow    Specimens removed if any:  ID Type Source Tests Collected by Time Destination   A : LEFT FALLOPIAN TUBE Tissue Abdominal PATHOLOGY SPECIMEN Martha Jacobo D.O. 2/1/2023  1:07 AM        Estimated Blood Loss: minimal    Findings: ectopic pregnancy obvious affecting all of the length of the left fallopian tube. Normal apearing right fallopian tube. Normal appearing ovaries bilaterally.     Complications: none        2/1/2023 1:32 AM Martha aJcobo D.O.

## 2023-02-01 NOTE — ED PROVIDER NOTES
ED Provider Note    CHIEF COMPLAINT  Chief Complaint   Patient presents with    Sent from Urgent Care     Had HCG taken today, and was told to come in for an ultrasound.    Lightheadedness     With SOB    Abdominal Pain     Shooting pain from the uterus up the abdomen.  Denies any vaginal bleeding since last dose of methotrexate 1/27       EXTERNAL RECORDS REVIEWED  Inpatient Notes OB/GYN    HPI/ROS  LIMITATION TO HISTORY   None  OUTSIDE HISTORIAN(S):  None    Chen Gupta is a 22 y.o. female who presents here for evaluation of left-sided abdominal pain.  The patient has a pregnancy noted last week, was suspected for ectopic, so she was given methotrexate on Friday.  The patient states that her pain returned intermittently tonight, to the left abdomen, and was sent here for evaluation.  She has no vomiting, no fever chills.  She has no chest pain, or shortness of breath.  Her last oral intake was around 5 PM, and she does not take any anticoagulants.  She is G2, P1  Last  meal was at 5 pm tonight.     PAST MEDICAL HISTORY   No bleeding disorders noted    SURGICAL HISTORY  patient denies any surgical history    FAMILY HISTORY  Family History   Problem Relation Age of Onset    No Known Problems Mother     Heart Disease Father     Diabetes Sister        SOCIAL HISTORY  Social History     Tobacco Use    Smoking status: Never    Smokeless tobacco: Never   Vaping Use    Vaping Use: Never used   Substance and Sexual Activity    Alcohol use: Not Currently    Drug use: No    Sexual activity: Yes     Partners: Male     Birth control/protection: None       CURRENT MEDICATIONS  Home Medications       Reviewed by Ashley Lerma R.N. (Registered Nurse) on 01/31/23 at 2025  Med List Status: Partial     Medication Last Dose Status   COLLAGEN PO  Active   Prenatal Vit-Fe Fumarate-FA (PRENATAL PO)  Active                    ALLERGIES  No Known Allergies    PHYSICAL EXAM  VITAL SIGNS: /54   Pulse 63   Temp 36.4  "°C (97.5 °F) (Temporal)   Resp 14   Ht 1.6 m (5' 3\")   Wt 106 kg (233 lb 4 oz)   LMP 12/08/2022   SpO2 99%   BMI 41.32 kg/m²    Constitutional: Well developed, well nourished. No acute distress.  HEENT: Normocephalic, atraumatic. Posterior pharynx clear and moist.  Eyes:  EOMI. Normal sclera.  Neck: Supple, Full range of motion, nontender.  Chest/Pulmonary: clear to ausculation. Symmetrical expansion.   Cardio: Regular rate and rhythm with no murmur.   Abdomen: Soft, mild left lower quadrant tenderness, and mild suprapubic tenderness, no peritoneal signs. No guarding. No palpable masses.  Back: No CVA tenderness, nontender midline, no step offs.  Musculoskeletal: No deformity, no edema, neurovascular intact.   Neuro: Clear speech, appropriate, cooperative, cranial nerves II-XII grossly intact.  Psych: Normal mood and affect      DIAGNOSTIC STUDIES / PROCEDURES  Results for orders placed or performed during the hospital encounter of 01/31/23   CBC WITH DIFFERENTIAL   Result Value Ref Range    WBC 12.2 (H) 4.8 - 10.8 K/uL    RBC 4.63 4.20 - 5.40 M/uL    Hemoglobin 13.9 12.0 - 16.0 g/dL    Hematocrit 41.0 37.0 - 47.0 %    MCV 88.6 81.4 - 97.8 fL    MCH 30.0 27.0 - 33.0 pg    MCHC 33.9 33.6 - 35.0 g/dL    RDW 39.4 35.9 - 50.0 fL    Platelet Count 319 164 - 446 K/uL    MPV 11.3 9.0 - 12.9 fL    Neutrophils-Polys 60.80 44.00 - 72.00 %    Lymphocytes 29.20 22.00 - 41.00 %    Monocytes 7.00 0.00 - 13.40 %    Eosinophils 1.60 0.00 - 6.90 %    Basophils 0.70 0.00 - 1.80 %    Immature Granulocytes 0.70 0.00 - 0.90 %    Nucleated RBC 0.00 /100 WBC    Neutrophils (Absolute) 7.44 (H) 2.00 - 7.15 K/uL    Lymphs (Absolute) 3.56 1.00 - 4.80 K/uL    Monos (Absolute) 0.85 0.00 - 0.85 K/uL    Eos (Absolute) 0.19 0.00 - 0.51 K/uL    Baso (Absolute) 0.09 0.00 - 0.12 K/uL    Immature Granulocytes (abs) 0.08 0.00 - 0.11 K/uL    NRBC (Absolute) 0.00 K/uL   COMP METABOLIC PANEL   Result Value Ref Range    Sodium 136 135 - 145 mmol/L    " Potassium 4.0 3.6 - 5.5 mmol/L    Chloride 104 96 - 112 mmol/L    Co2 23 20 - 33 mmol/L    Anion Gap 9.0 7.0 - 16.0    Glucose 87 65 - 99 mg/dL    Bun 12 8 - 22 mg/dL    Creatinine 0.45 (L) 0.50 - 1.40 mg/dL    Calcium 8.8 8.5 - 10.5 mg/dL    AST(SGOT) 14 12 - 45 U/L    ALT(SGPT) 20 2 - 50 U/L    Alkaline Phosphatase 63 30 - 99 U/L    Total Bilirubin <0.2 0.1 - 1.5 mg/dL    Albumin 3.0 (L) 3.2 - 4.9 g/dL    Total Protein 5.7 (L) 6.0 - 8.2 g/dL    Globulin 2.7 1.9 - 3.5 g/dL    A-G Ratio 1.1 g/dL   LIPASE   Result Value Ref Range    Lipase 16 11 - 82 U/L   HCG QUANTITATIVE   Result Value Ref Range    Bhcg 96624.0 (H) 0.0 - 5.0 mIU/mL   CORRECTED CALCIUM   Result Value Ref Range    Correct Calcium 9.6 8.5 - 10.5 mg/dL   ESTIMATED GFR   Result Value Ref Range    GFR (CKD-EPI) 138 >60 mL/min/1.73 m 2   URINALYSIS,CULTURE IF INDICATED    Specimen: Urine, Clean Catch   Result Value Ref Range    Micro Urine Req Microscopic    RH TYPE FOR RHOGAM FROM E.D.   Result Value Ref Range    Emergency Department Rh Typing POS          RADIOLOGY  I have independently interpreted the diagnostic imaging associated with this visit and am waiting the final reading from the radiologist.   My preliminary interpretation is a follows: reviewed   Radiologist interpretation:   US-OB 1ST TRIMESTER WITH TRANSVAGINAL (COMBO)   Final Result      LEFT adnexal ectopic pregnancy      Findings were communicated with and acknowledged by AJAY MILLS via [employer] on 1/31/2023 10:47 PM.            COURSE & MEDICAL DECISION MAKING    INITIAL ASSESSMENT, COURSE AND PLAN  Care Narrative: This is a 22-year-old female here for evaluation of abdominal pain.  Patient was evaluated last week, given methotrexate, and told to follow-up.  She did so, and came in tonight because she was having some intermittent left-sided abdominal pain.  It was noted that she has an ectopic pregnancy at this time, and will need the OR.  I did speak to Dr. Jacobo, who is on  for OB/GYN, and she will come and see the patient.      DISPOSITION AND DISCUSSIONS  I have discussed management of the patient with the following physicians and CRISELDA's: Dr. Jacobo, GYN. She will admit the pt.  She does not need additional blood work, or abx at this time.     CRITICAL CARE  The very real possibility of a deterioration of this patient's condition required the highest level of my preparedness for sudden, emergent intervention.  I provided critical care services, which included medication orders, frequent reevaluations of the patient's condition and response to treatment, ordering and reviewing test results, and discussing the case with various consultants.  The critical care time associated with the care of the patient was 45 minutes. Review chart for interventions. This time is exclusive of any other billable procedures.       Discussion of management with other [facility] or appropriate source(s): Radiology    Escalation of care considered, and ultimately not performed:    FINAL DIAGNOSIS  1. Left tubal pregnancy without intrauterine pregnancy    2.      Critical care 45 minutes.        Electronically signed by: Bill Huitron D.O., 1/31/2023 10:56 PM

## 2023-02-01 NOTE — ANESTHESIA TIME REPORT
Anesthesia Start and Stop Event Times     Date Time Event    2/1/2023 0021 Ready for Procedure     0030 Anesthesia Start     0143 Anesthesia Stop        Responsible Staff  02/01/23    Name Role Begin End    Sb Lopez M.D. Anesth 0030 0143        Overtime Reason:  no overtime (within assigned shift)    Comments:

## 2023-02-01 NOTE — ED TRIAGE NOTES
"Chief Complaint   Patient presents with    Sent from Urgent Care     Had HCG taken today, and was told to come in for an ultrasound.    Lightheadedness     With SOB    Abdominal Pain     Shooting pain from the uterus up the abdomen.  Denies any vaginal bleeding since last dose of methotrexate 1/27       Pt ambulated to triage. Pt A&Ox4, came in for above complaint.     Pt to lobby . Pt educated on alerting staff in changes to condition. Pt verbalized understanding. Protocol ordered.     /45   Pulse 61   Temp 36.4 °C (97.5 °F) (Temporal)   Resp 14   Ht 1.6 m (5' 3\")   Wt 106 kg (233 lb 4 oz)   LMP 12/08/2022   SpO2 100%   BMI 41.32 kg/m²     "

## 2023-02-06 ENCOUNTER — TELEPHONE (OUTPATIENT)
Dept: OBGYN | Facility: CLINIC | Age: 23
End: 2023-02-06
Payer: COMMERCIAL

## 2023-02-06 DIAGNOSIS — N92.4 EXCESSIVE BLEEDING IN PREMENOPAUSAL PERIOD: ICD-10-CM

## 2023-02-06 RX ORDER — TRANEXAMIC ACID 650 MG/1
1300 TABLET ORAL 3 TIMES DAILY
Qty: 15 TABLET | Refills: 3 | Status: SHIPPED | OUTPATIENT
Start: 2023-02-06 | End: 2023-02-11

## 2023-02-06 NOTE — TELEPHONE ENCOUNTER
02/06/23  10:38 AM  Spoke with patient she reports since Saturday vaginal bleeding. She reports bleeding is getting worse. She reports blood clots when she wipes and when she urinates. The clots are dark red. Some clots are the size of a dime and some are the size of a quarter. Patient is taking iron supplement daily. Pt admits to having a history of heavy flow but the amount of blood clots was concerning to her. Informed pt if soaking through one or more pads per hour, light-headed or experiencing increasing fatigue to please go to the nearest emergency room. Pt agreed to plan.     Dr Jacobo and MA discussed plan of care. Please see patient mychart message thread.

## 2023-02-16 ENCOUNTER — GYNECOLOGY VISIT (OUTPATIENT)
Dept: OBGYN | Facility: CLINIC | Age: 23
End: 2023-02-16
Payer: COMMERCIAL

## 2023-02-16 ENCOUNTER — HOSPITAL ENCOUNTER (OUTPATIENT)
Facility: MEDICAL CENTER | Age: 23
End: 2023-02-16
Attending: OBSTETRICS & GYNECOLOGY
Payer: COMMERCIAL

## 2023-02-16 VITALS — DIASTOLIC BLOOD PRESSURE: 65 MMHG | WEIGHT: 233.5 LBS | BODY MASS INDEX: 41.36 KG/M2 | SYSTOLIC BLOOD PRESSURE: 134 MMHG

## 2023-02-16 DIAGNOSIS — F43.21 GRIEF: ICD-10-CM

## 2023-02-16 DIAGNOSIS — L08.9 WOUND INFECTION: ICD-10-CM

## 2023-02-16 DIAGNOSIS — F43.21 GRIEF ASSOCIATED WITH LOSS OF FETUS: ICD-10-CM

## 2023-02-16 DIAGNOSIS — T14.8XXA WOUND INFECTION: ICD-10-CM

## 2023-02-16 PROCEDURE — 87186 SC STD MICRODIL/AGAR DIL: CPT | Mod: 91

## 2023-02-16 PROCEDURE — 87075 CULTR BACTERIA EXCEPT BLOOD: CPT

## 2023-02-16 PROCEDURE — 99999 PR NO CHARGE: CPT | Performed by: OBSTETRICS & GYNECOLOGY

## 2023-02-16 PROCEDURE — 87077 CULTURE AEROBIC IDENTIFY: CPT | Mod: 91

## 2023-02-16 PROCEDURE — 87205 SMEAR GRAM STAIN: CPT

## 2023-02-16 PROCEDURE — 87076 CULTURE ANAEROBE IDENT EACH: CPT

## 2023-02-16 PROCEDURE — 87070 CULTURE OTHR SPECIMN AEROBIC: CPT

## 2023-02-16 RX ORDER — SULFAMETHOXAZOLE AND TRIMETHOPRIM 800; 160 MG/1; MG/1
1 TABLET ORAL 2 TIMES DAILY
Qty: 14 TABLET | Refills: 0 | Status: SHIPPED | OUTPATIENT
Start: 2023-02-16

## 2023-02-16 ASSESSMENT — FIBROSIS 4 INDEX: FIB4 SCORE: 0.23

## 2023-02-16 NOTE — PROGRESS NOTES
SUBJECTIVE:  Chen Gupta presents following ectopic pregnancy that required surgical intervention on 2023.  She underwent a laparoscopic left salpingectomy.    Eating a regular diet without difficulty. Bowel movement are Normal.  The patient is not having any pain. Patient admits to incisional pain, drainage or redness    OBJECTIVE:  /65 (BP Location: Left arm, Patient Position: Sitting, BP Cuff Size: Adult)   Wt 233 lb 8 oz   LMP 2022   BMI 41.36 kg/m²   Current Outpatient Medications on File Prior to Visit   Medication Sig Dispense Refill    ibuprofen (MOTRIN) 600 MG Tab Take 1 Tablet by mouth every 6 hours as needed for Moderate Pain. 30 Tablet 1    COLLAGEN PO Take 1 Tablet by mouth every day.      Prenatal Vit-Fe Fumarate-FA (PRENATAL PO) Take 1 Tablet by mouth every day.       No current facility-administered medications on file prior to visit.       Constitutional:  alert, healthy, no distress.  Abdomen:  soft, non-tender.  Incision: Umbilical incision does show signs of degranulation and erythema with purulent discharge, otherwise other incisions are healing well, no drainage, no erythema, no hernia, no seroma, no swelling, no dehiscence, incision well approximated.    IMPRESSION: Doing well postoperatively.  Wound culture performed today and will start patient on Bactrim to treat skin infection.  If she does not notice improvement within 3 days she is to let us know.  Discussed precautions such as fever or worsening of symptoms and reasons to return to the office or emergency department.    Discussed [facility] for group support for  loss.  Behavioral health referral requested as patient is having a hard time emotionally coping with loss.    Lab:   Recent Results (from the past 1008 hour(s))   CBC WITH DIFFERENTIAL    Collection Time: 23 12:00 PM   Result Value Ref Range    WBC 12.6 (H) 4.8 - 10.8 K/uL    RBC 4.77 4.20 - 5.40 M/uL     Hemoglobin 14.5 12.0 - 16.0 g/dL    Hematocrit 42.3 37.0 - 47.0 %    MCV 88.7 81.4 - 97.8 fL    MCH 30.4 27.0 - 33.0 pg    MCHC 34.3 33.6 - 35.0 g/dL    RDW 41.1 35.9 - 50.0 fL    Platelet Count 313 164 - 446 K/uL    MPV 11.2 9.0 - 12.9 fL    Neutrophils-Polys 75.20 (H) 44.00 - 72.00 %    Lymphocytes 15.20 (L) 22.00 - 41.00 %    Monocytes 7.10 0.00 - 13.40 %    Eosinophils 1.00 0.00 - 6.90 %    Basophils 0.70 0.00 - 1.80 %    Immature Granulocytes 0.80 0.00 - 0.90 %    Nucleated RBC 0.00 /100 WBC    Neutrophils (Absolute) 9.47 (H) 2.00 - 7.15 K/uL    Lymphs (Absolute) 1.91 1.00 - 4.80 K/uL    Monos (Absolute) 0.90 (H) 0.00 - 0.85 K/uL    Eos (Absolute) 0.12 0.00 - 0.51 K/uL    Baso (Absolute) 0.09 0.00 - 0.12 K/uL    Immature Granulocytes (abs) 0.10 0.00 - 0.11 K/uL    NRBC (Absolute) 0.00 K/uL   COMP METABOLIC PANEL    Collection Time: 01/25/23 12:00 PM   Result Value Ref Range    Sodium 137 135 - 145 mmol/L    Potassium 3.9 3.6 - 5.5 mmol/L    Chloride 108 96 - 112 mmol/L    Co2 20 20 - 33 mmol/L    Anion Gap 9.0 7.0 - 16.0    Glucose 82 65 - 99 mg/dL    Bun 10 8 - 22 mg/dL    Creatinine 0.39 (L) 0.50 - 1.40 mg/dL    Calcium 8.4 (L) 8.5 - 10.5 mg/dL    AST(SGOT) 14 12 - 45 U/L    ALT(SGPT) 15 2 - 50 U/L    Alkaline Phosphatase 64 30 - 99 U/L    Total Bilirubin <0.2 0.1 - 1.5 mg/dL    Albumin 2.8 (L) 3.2 - 4.9 g/dL    Total Protein 5.5 (L) 6.0 - 8.2 g/dL    Globulin 2.7 1.9 - 3.5 g/dL    A-G Ratio 1.0 g/dL   LIPASE    Collection Time: 01/25/23 12:00 PM   Result Value Ref Range    Lipase 20 11 - 82 U/L   HCG QUAL SERUM    Collection Time: 01/25/23 12:00 PM   Result Value Ref Range    Beta-Hcg Qualitative Serum Positive (A) Negative   COD (ADULT)    Collection Time: 01/25/23 12:00 PM   Result Value Ref Range    ABO Grouping Only O     Rh Grouping Only POS     Antibody Screen-Cod NEG    PROTHROMBIN TIME    Collection Time: 01/25/23 12:00 PM   Result Value Ref Range    PT 13.9 12.0 - 14.6 sec    INR 1.08 0.87 - 1.13    APTT    Collection Time: 01/25/23 12:00 PM   Result Value Ref Range    APTT 27.8 24.7 - 36.0 sec   CORRECTED CALCIUM    Collection Time: 01/25/23 12:00 PM   Result Value Ref Range    Correct Calcium 9.4 8.5 - 10.5 mg/dL   ESTIMATED GFR    Collection Time: 01/25/23 12:00 PM   Result Value Ref Range    GFR (CKD-EPI) 143 >60 mL/min/1.73 m 2   HCG QUANTITATIVE SERUM    Collection Time: 01/25/23 12:00 PM   Result Value Ref Range    Bhcg 18257.0 (H) 0.0 - 5.0 mIU/mL   RH TYPE FOR RHOGAM FROM E.D.    Collection Time: 01/25/23 12:00 PM   Result Value Ref Range    Number Of Rh Doses Indicated ZERO    URINALYSIS,CULTURE IF INDICATED    Collection Time: 01/25/23  1:09 PM    Specimen: Urine   Result Value Ref Range    Color Yellow     Character Clear     Specific Gravity 1.018 <1.035    Ph 6.5 5.0 - 8.0    Glucose Negative Negative mg/dL    Ketones Negative Negative mg/dL    Protein 300 (A) Negative mg/dL    Bilirubin Negative Negative    Urobilinogen, Urine 0.2 Negative    Nitrite Negative Negative    Leukocyte Esterase Negative Negative    Occult Blood Negative Negative    Micro Urine Req Microscopic    URINE MICROSCOPIC (W/UA)    Collection Time: 01/25/23  1:09 PM   Result Value Ref Range    WBC 2-5 /hpf    RBC 2-5 (A) /hpf    Bacteria Few (A) None /hpf    Epithelial Cells Few /hpf    Mucous Threads Many /hpf    Hyaline Cast 0-2 /lpf   URINE CULTURE-EXISTING-LESS THAN 48 HOURS    Collection Time: 01/25/23  1:09 PM    Specimen: Urine, Clean Catch   Result Value Ref Range    Significant Indicator NEG     Source UR     Site URINE, CLEAN CATCH     Culture Result Usual urogenital nico 10-50,000 cfu/mL    WET PREP    Collection Time: 01/25/23  2:55 PM    Specimen: Vaginal; Genital   Result Value Ref Range    Significant Indicator NEG     Source GEN     Site VAGINAL     Wet Prep For Parasites       No yeast.  No motile Trichomonas seen.  No clue cells seen.     Chlamydia/GC, PCR (Genital/Anal swab)    Collection Time: 01/25/23   2:55 PM    Specimen: Genital   Result Value Ref Range    C. trachomatis by PCR Negative Negative    N. gonorrhoeae by PCR Negative Negative    Source Vaginal    HCG QUANTITATIVE    Collection Time: 01/27/23 11:21 AM   Result Value Ref Range    Bhcg 49476.0 (H) 0.0 - 5.0 mIU/mL   HCG QUANTITATIVE    Collection Time: 01/31/23 12:43 PM   Result Value Ref Range    Bhcg 36868.0 (H) 0.0 - 5.0 mIU/mL   CBC WITH DIFFERENTIAL    Collection Time: 01/31/23  8:32 PM   Result Value Ref Range    WBC 12.2 (H) 4.8 - 10.8 K/uL    RBC 4.63 4.20 - 5.40 M/uL    Hemoglobin 13.9 12.0 - 16.0 g/dL    Hematocrit 41.0 37.0 - 47.0 %    MCV 88.6 81.4 - 97.8 fL    MCH 30.0 27.0 - 33.0 pg    MCHC 33.9 33.6 - 35.0 g/dL    RDW 39.4 35.9 - 50.0 fL    Platelet Count 319 164 - 446 K/uL    MPV 11.3 9.0 - 12.9 fL    Neutrophils-Polys 60.80 44.00 - 72.00 %    Lymphocytes 29.20 22.00 - 41.00 %    Monocytes 7.00 0.00 - 13.40 %    Eosinophils 1.60 0.00 - 6.90 %    Basophils 0.70 0.00 - 1.80 %    Immature Granulocytes 0.70 0.00 - 0.90 %    Nucleated RBC 0.00 /100 WBC    Neutrophils (Absolute) 7.44 (H) 2.00 - 7.15 K/uL    Lymphs (Absolute) 3.56 1.00 - 4.80 K/uL    Monos (Absolute) 0.85 0.00 - 0.85 K/uL    Eos (Absolute) 0.19 0.00 - 0.51 K/uL    Baso (Absolute) 0.09 0.00 - 0.12 K/uL    Immature Granulocytes (abs) 0.08 0.00 - 0.11 K/uL    NRBC (Absolute) 0.00 K/uL   COMP METABOLIC PANEL    Collection Time: 01/31/23  8:32 PM   Result Value Ref Range    Sodium 136 135 - 145 mmol/L    Potassium 4.0 3.6 - 5.5 mmol/L    Chloride 104 96 - 112 mmol/L    Co2 23 20 - 33 mmol/L    Anion Gap 9.0 7.0 - 16.0    Glucose 87 65 - 99 mg/dL    Bun 12 8 - 22 mg/dL    Creatinine 0.45 (L) 0.50 - 1.40 mg/dL    Calcium 8.8 8.5 - 10.5 mg/dL    AST(SGOT) 14 12 - 45 U/L    ALT(SGPT) 20 2 - 50 U/L    Alkaline Phosphatase 63 30 - 99 U/L    Total Bilirubin <0.2 0.1 - 1.5 mg/dL    Albumin 3.0 (L) 3.2 - 4.9 g/dL    Total Protein 5.7 (L) 6.0 - 8.2 g/dL    Globulin 2.7 1.9 - 3.5 g/dL    A-G  Ratio 1.1 g/dL   LIPASE    Collection Time: 01/31/23  8:32 PM   Result Value Ref Range    Lipase 16 11 - 82 U/L   HCG QUANTITATIVE    Collection Time: 01/31/23  8:32 PM   Result Value Ref Range    Bhcg 68550.0 (H) 0.0 - 5.0 mIU/mL   CORRECTED CALCIUM    Collection Time: 01/31/23  8:32 PM   Result Value Ref Range    Correct Calcium 9.6 8.5 - 10.5 mg/dL   ESTIMATED GFR    Collection Time: 01/31/23  8:32 PM   Result Value Ref Range    GFR (CKD-EPI) 138 >60 mL/min/1.73 m 2   RH TYPE FOR RHOGAM FROM E.D.    Collection Time: 01/31/23  8:32 PM   Result Value Ref Range    Emergency Department Rh Typing POS     Number Of Rh Doses Indicated ZERO    URINALYSIS,CULTURE IF INDICATED    Collection Time: 01/31/23  8:57 PM    Specimen: Urine, Clean Catch   Result Value Ref Range    Color Yellow     Character Clear     Specific Gravity 1.026 <1.035    Ph 5.0 5.0 - 8.0    Glucose Negative Negative mg/dL    Ketones Trace (A) Negative mg/dL    Protein 100 (A) Negative mg/dL    Bilirubin Negative Negative    Urobilinogen, Urine 0.2 Negative    Nitrite Negative Negative    Leukocyte Esterase Negative Negative    Occult Blood Negative Negative    Micro Urine Req Microscopic    URINE MICROSCOPIC (W/UA)    Collection Time: 01/31/23  8:57 PM   Result Value Ref Range    WBC 0-2 /hpf    RBC 0-2 /hpf    Bacteria Negative None /hpf    Epithelial Cells Few /hpf    Hyaline Cast 0-2 /lpf   Histology Request    Collection Time: 02/01/23  1:07 AM   Result Value Ref Range    Pathology Request Sent to Histo        PLAN:  Continue any current medications.  (See Med List for details.)  Return to clinic  : in 2 week(s).

## 2023-02-16 NOTE — PROGRESS NOTES
Pt is here for Post-op visit  2/1/2023:Pelviscopy for Ectopic, Salpingectomy.  Pt states she might be having an infection from her surgery.  Pt states no other complaints for today.

## 2023-02-17 LAB
GRAM STN SPEC: NORMAL
SIGNIFICANT IND 70042: NORMAL
SITE SITE: NORMAL
SOURCE SOURCE: NORMAL

## 2023-02-19 LAB
BACTERIA WND AEROBE CULT: ABNORMAL
GRAM STN SPEC: ABNORMAL
SIGNIFICANT IND 70042: ABNORMAL
SITE SITE: ABNORMAL
SOURCE SOURCE: ABNORMAL

## 2023-02-21 LAB
BACTERIA SPEC ANAEROBE CULT: ABNORMAL
BACTERIA SPEC ANAEROBE CULT: ABNORMAL
SIGNIFICANT IND 70042: ABNORMAL
SITE SITE: ABNORMAL
SOURCE SOURCE: ABNORMAL

## 2023-03-08 ENCOUNTER — APPOINTMENT (OUTPATIENT)
Dept: OBGYN | Facility: CLINIC | Age: 23
End: 2023-03-08
Payer: COMMERCIAL

## 2023-04-06 ENCOUNTER — APPOINTMENT (OUTPATIENT)
Dept: OBGYN | Facility: CLINIC | Age: 23
End: 2023-04-06
Payer: COMMERCIAL

## 2023-04-12 ENCOUNTER — GYNECOLOGY VISIT (OUTPATIENT)
Dept: OBGYN | Facility: CLINIC | Age: 23
End: 2023-04-12
Payer: COMMERCIAL

## 2023-04-12 ENCOUNTER — HOSPITAL ENCOUNTER (OUTPATIENT)
Facility: MEDICAL CENTER | Age: 23
End: 2023-04-12
Attending: NURSE PRACTITIONER
Payer: COMMERCIAL

## 2023-04-12 VITALS
WEIGHT: 239 LBS | DIASTOLIC BLOOD PRESSURE: 70 MMHG | BODY MASS INDEX: 42.35 KG/M2 | SYSTOLIC BLOOD PRESSURE: 102 MMHG | HEIGHT: 63 IN

## 2023-04-12 DIAGNOSIS — Z01.419 WELL WOMAN EXAM WITH ROUTINE GYNECOLOGICAL EXAM: Primary | ICD-10-CM

## 2023-04-12 DIAGNOSIS — N92.0 MENORRHAGIA WITH REGULAR CYCLE: ICD-10-CM

## 2023-04-12 DIAGNOSIS — Z01.419 WELL WOMAN EXAM WITH ROUTINE GYNECOLOGICAL EXAM: ICD-10-CM

## 2023-04-12 DIAGNOSIS — Z87.59 HISTORY OF ECTOPIC PREGNANCY: ICD-10-CM

## 2023-04-12 PROBLEM — O99.213 OBESITY AFFECTING PREGNANCY IN THIRD TRIMESTER: Status: RESOLVED | Noted: 2021-04-01 | Resolved: 2023-04-12

## 2023-04-12 PROBLEM — O00.90 ECTOPIC PREGNANCY WITHOUT INTRAUTERINE PREGNANCY: Status: RESOLVED | Noted: 2023-02-01 | Resolved: 2023-04-12

## 2023-04-12 PROBLEM — Z34.00 SUPERVISION OF NORMAL FIRST PREGNANCY, ANTEPARTUM: Status: RESOLVED | Noted: 2021-06-24 | Resolved: 2023-04-12

## 2023-04-12 PROCEDURE — G0101 CA SCREEN;PELVIC/BREAST EXAM: HCPCS | Performed by: NURSE PRACTITIONER

## 2023-04-12 PROCEDURE — 88175 CYTOPATH C/V AUTO FLUID REDO: CPT

## 2023-04-12 PROCEDURE — 87591 N.GONORRHOEAE DNA AMP PROB: CPT

## 2023-04-12 PROCEDURE — 87491 CHLMYD TRACH DNA AMP PROBE: CPT

## 2023-04-12 ASSESSMENT — FIBROSIS 4 INDEX: FIB4 SCORE: 0.23

## 2023-04-12 NOTE — PROGRESS NOTES
Patient here for annual exam.   Last pap done/results : Today   LMP : 4/2/23   BCM : None   Last Mammogram, if applicable: N/A   Pt states after having the L tube removed in Jan 2023 due to an ectopic she has only had 2 periods and they have been really heavy. Also would like to discuss what pregnancy would like w the L tube removed. *Cervical cancer runs in her family.

## 2023-04-12 NOTE — PROGRESS NOTES
Chen Gupta is a 22 y.o. y.o. female who presents for her Gynecologic Exam        HPI Comments: Pt presents for well woman exam. Pt has complaints related to irregular periods. Patient's last menstrual period was 2023 (exact date).    Chen is here today for well woman exam  She is a  with hx of  x 1 of a full term infant and an ectopic pregnancy this year with left tube removal.  She is currently sexually active with 1 male partner. No concerns. Does not want another pregnancy at this time. Probably will wait until her first child is 3-4 year old.  Denies any breast issues.  She states her LMP was 2023 and lasted for about 5-7 days with heavy bleeding, states she is filling a large pad every 2 hours with clots. Feels okay overall, but pap is saturated.  She is not currently on any BCM  Many questions regarding pregnancy after ectopic    Review of Systems   Pertinent positives documented in HPI and all other systems reviewed & are negative    All PMH, PSH, allergies, social history and FH reviewed and updated today:  History reviewed. No pertinent past medical history.  Past Surgical History:   Procedure Laterality Date    ID LAP,DIAGNOSTIC ABDOMEN Left 2023    Procedure: PELVISCOPY FOR RUPTURED ECTOPIC;  Surgeon: Martha Jacobo D.O.;  Location: SURGERY [facility];  Service: Obstetrics    SALPINGECTOMY Left 2023    Procedure: SALPINGECTOMY;  Surgeon: Martha Jacobo D.O.;  Location: SURGERY [facility];  Service: Obstetrics     Patient has no known allergies.  Social History     Socioeconomic History    Marital status:    Tobacco Use    Smoking status: Never    Smokeless tobacco: Never   Vaping Use    Vaping Use: Never used   Substance and Sexual Activity    Alcohol use: Not Currently    Drug use: No    Sexual activity: Yes     Partners: Male     Birth control/protection: None     Family History   Problem Relation Age of Onset    No Known Problems Mother   "   Heart Disease Father     Diabetes Sister      Medications:   Current Outpatient Medications Ordered in Epic   Medication Sig Dispense Refill    sulfamethoxazole-trimethoprim (BACTRIM DS) 800-160 MG tablet Take 1 Tablet by mouth 2 times a day. (Patient not taking: Reported on 4/12/2023) 14 Tablet 0    ibuprofen (MOTRIN) 600 MG Tab Take 1 Tablet by mouth every 6 hours as needed for Moderate Pain. (Patient not taking: Reported on 4/12/2023) 30 Tablet 1    COLLAGEN PO Take 1 Tablet by mouth every day. (Patient not taking: Reported on 4/12/2023)      Prenatal Vit-Fe Fumarate-FA (PRENATAL PO) Take 1 Tablet by mouth every day. (Patient not taking: Reported on 4/12/2023)       No current [facility]-ordered facility-administered medications on file.          Objective:   Vital measurements:  /70 (BP Location: Right arm, Patient Position: Sitting, BP Cuff Size: Large adult)   Ht 5' 3\"   Wt 239 lb   Body mass index is 42.34 kg/m². (Goal BM I>18 <25)    Physical Exam     Chaperone offered: yes    Nursing note and vitals reviewed.  Constitutional: She is oriented to person, place, and time. She appears well-developed and well-nourished. No distress.     HEENT:   Head: Normocephalic and atraumatic.   Right Ear: External ear normal.   Left Ear: External ear normal.   Nose: Nose normal.   Eyes: Conjunctivae and EOM are normal. Pupils are equal, round, and reactive to light. No scleral icterus.     Neck: Normal range of motion. Neck supple. No tracheal deviation present. No thyromegaly present.     Pulmonary/Chest: Effort normal and breath sounds normal. No respiratory distress. She has no wheezes. She has no rales. She exhibits no tenderness.     Cardiovascular: Regular, rate and rhythm. No JVD.    Abdominal: Soft. Bowel sounds are normal. She exhibits no distension and no mass. No tenderness. She has no rebound and no guarding.     Breast:  Symmetrical, normal consistency without masses., No dimpling or skin changes, Normal " nipples without discharge, no axillary lymphadenopathy, negative, Inspection negative. No nipple discharge or bleeding, No masses    Genitourinary:  Pelvic exam was performed with patient supine.  External genitalia with no abnormal pigmentation, labial fusion,rash, tenderness, lesion or injury to the labia bilaterally.  Vagina is moist with no lesions, foul discharge, erythema, tenderness or bleeding. No foreign body around the vagina or signs of injury.   Cervix exhibits no motion tenderness, no discharge and no friability.   Uterus is mid-position, not deviated, not enlarged, not fixed and not tender.  Right adnexum displays no mass, no tenderness and no fullness. Left adnexum displays no mass, no tenderness and no fullness.   Bladder exhibits no tenderness and is normal is size and contour. No prolapse noted  Urethra is non tender and no discharge noted.  Anus is without hemorrhoids or prolapse noted. No bleeding on exam.    Musculoskeletal: Normal range of motion. She exhibits no edema and no tenderness.     Lymphadenopathy: She has no cervical adenopathy.     Neurological: She is alert and oriented to person, place, and time. She exhibits normal muscle tone.     Skin: Skin is warm and dry. No rash noted. She is not diaphoretic. No erythema. No pallor.     Psychiatric: She has a normal mood and affect. Her behavior is normal. Judgment and thought content normal.          Assessment:     1. Well woman exam with routine gynecological exam        2. History of ectopic pregnancy        3. Menorrhagia with regular cycle  US-PELVIC COMPLETE (TRANSABDOMINAL/TRANSVAGINAL) (COMBO)        Discussed US to check uterus and ovaries/pelvis  Will likely want BCM but wants to wait until after US    Discussed with patient today were forms of birth control. We reviewed birth control pills and their use, risks benefits and side effects. I reviewed Depo-Provera use as well as risk-benefit side effect, I also discussed with the  patient NuvaRing risks benefits and side effects. We also discussed IUDs, discussed progesterone containing IUDs such as Mirena and Jade. I also discussed ParaGard IUD. I discussed risks benefits and side effects of all these medications. We also discussed Nexplanon, subdermal implant, risks benefits and side effects.  Also discussed with patient were barrier methods especially condoms for prevention of STDs.     Likely jade or mirena- will await US and discuss more after    Pap done today  Pelvic US ordered    Plan:   Pap and physical exam performed  Monthly SBE.  Counseling: breast self exam, STD prevention, use and side effects of OCPs, and family planning choices  Encourage exercise and proper diet.  Mammograms starting @ age 40 annually.  See medications and orders placed in encounter report.    Follow up after US for BCM  Will call with results    Kim FARRELLM, APRN

## 2023-04-13 LAB
C TRACH DNA GENITAL QL NAA+PROBE: NEGATIVE
CYTOLOGY REG CYTOL: NORMAL
N GONORRHOEA DNA GENITAL QL NAA+PROBE: NEGATIVE
SPECIMEN SOURCE: NORMAL

## 2023-05-09 ENCOUNTER — HOSPITAL ENCOUNTER (OUTPATIENT)
Dept: RADIOLOGY | Facility: MEDICAL CENTER | Age: 23
End: 2023-05-09
Attending: NURSE PRACTITIONER
Payer: COMMERCIAL

## 2023-05-09 DIAGNOSIS — N92.0 MENORRHAGIA WITH REGULAR CYCLE: ICD-10-CM

## 2023-05-09 PROCEDURE — 76830 TRANSVAGINAL US NON-OB: CPT

## 2023-05-10 DIAGNOSIS — N83.202 LEFT OVARIAN CYST: ICD-10-CM

## 2023-05-12 ENCOUNTER — TELEPHONE (OUTPATIENT)
Dept: OBGYN | Facility: CLINIC | Age: 23
End: 2023-05-12
Payer: COMMERCIAL

## 2023-05-12 NOTE — TELEPHONE ENCOUNTER
----- Message from Kim Stein C.N.M. sent at 5/10/2023  2:37 PM PDT -----  Please call with results. She needs another US in 6-8 weeks to recheck the cyst. Orders placed, please give her the number to call and schedule    Thanks    Kim Stein CNM, APRN        Called pt and informed as above. Pt state she already schedule US appt for 6/20/2023. Pt states since her last appt her pain has become more frequent, when she uses the restroom and with intercourse and she has taken OTC meds for pain. Explained to pt this RN will send a message to Kim VAZQUEZ CNM and will call her back as soon as this RN hears back from provider, pt informed provider is not back in clinic until Tuesday 5/16/2032. Advised pt if her pain is so intense and does not improve with Tylenol or Ibuprofen advised to go to ER for evaluation. Pt agreed and verbalized understanding.     Staff message sent to Stephanie VAZQUEZ CNM     5/12/2023   9:36pm ADDISON Fontanez R.N.  I agree with this. Tylenol and Ibuprofen should be appropriate, but if it is not, she needs urgent follow up in the ED.     Thank you     Kim     Neg[employer] called pt and informed as above. Pt agreed and verbalized understanding.

## 2023-06-20 ENCOUNTER — HOSPITAL ENCOUNTER (OUTPATIENT)
Dept: RADIOLOGY | Facility: MEDICAL CENTER | Age: 23
End: 2023-06-20
Attending: NURSE PRACTITIONER
Payer: COMMERCIAL

## 2023-06-20 DIAGNOSIS — N83.202 LEFT OVARIAN CYST: ICD-10-CM

## 2023-06-20 PROCEDURE — 76830 TRANSVAGINAL US NON-OB: CPT

## 2023-11-09 ENCOUNTER — APPOINTMENT (OUTPATIENT)
Dept: RADIOLOGY | Facility: MEDICAL CENTER | Age: 23
End: 2023-11-09
Attending: STUDENT IN AN ORGANIZED HEALTH CARE EDUCATION/TRAINING PROGRAM
Payer: COMMERCIAL

## 2023-11-09 ENCOUNTER — HOSPITAL ENCOUNTER (EMERGENCY)
Facility: MEDICAL CENTER | Age: 23
End: 2023-11-09
Attending: STUDENT IN AN ORGANIZED HEALTH CARE EDUCATION/TRAINING PROGRAM
Payer: COMMERCIAL

## 2023-11-09 VITALS
HEART RATE: 82 BPM | BODY MASS INDEX: 42.52 KG/M2 | TEMPERATURE: 97.5 F | DIASTOLIC BLOOD PRESSURE: 63 MMHG | RESPIRATION RATE: 16 BRPM | HEIGHT: 63 IN | OXYGEN SATURATION: 97 % | SYSTOLIC BLOOD PRESSURE: 120 MMHG | WEIGHT: 240 LBS

## 2023-11-09 DIAGNOSIS — Z30.431 IUD CHECK UP: ICD-10-CM

## 2023-11-09 DIAGNOSIS — R10.9 ABDOMINAL CRAMPING: ICD-10-CM

## 2023-11-09 LAB
ALBUMIN SERPL BCP-MCNC: 4.2 G/DL (ref 3.2–4.9)
ALBUMIN/GLOB SERPL: 1.5 G/DL
ALP SERPL-CCNC: 62 U/L (ref 30–99)
ALT SERPL-CCNC: 27 U/L (ref 2–50)
ANION GAP SERPL CALC-SCNC: 11 MMOL/L (ref 7–16)
APPEARANCE UR: CLEAR
AST SERPL-CCNC: 15 U/L (ref 12–45)
BASOPHILS # BLD AUTO: 0.8 % (ref 0–1.8)
BASOPHILS # BLD: 0.12 K/UL (ref 0–0.12)
BILIRUB SERPL-MCNC: 0.2 MG/DL (ref 0.1–1.5)
BILIRUB UR QL STRIP.AUTO: NEGATIVE
BUN SERPL-MCNC: 12 MG/DL (ref 8–22)
CALCIUM ALBUM COR SERPL-MCNC: 8.7 MG/DL (ref 8.5–10.5)
CALCIUM SERPL-MCNC: 8.9 MG/DL (ref 8.5–10.5)
CANDIDA DNA VAG QL PROBE+SIG AMP: NEGATIVE
CHLORIDE SERPL-SCNC: 106 MMOL/L (ref 96–112)
CO2 SERPL-SCNC: 22 MMOL/L (ref 20–33)
COLOR UR: YELLOW
CREAT SERPL-MCNC: 0.45 MG/DL (ref 0.5–1.4)
EOSINOPHIL # BLD AUTO: 0.3 K/UL (ref 0–0.51)
EOSINOPHIL NFR BLD: 2 % (ref 0–6.9)
ERYTHROCYTE [DISTWIDTH] IN BLOOD BY AUTOMATED COUNT: 40.6 FL (ref 35.9–50)
G VAGINALIS DNA VAG QL PROBE+SIG AMP: NEGATIVE
GFR SERPLBLD CREATININE-BSD FMLA CKD-EPI: 138 ML/MIN/1.73 M 2
GLOBULIN SER CALC-MCNC: 2.8 G/DL (ref 1.9–3.5)
GLUCOSE SERPL-MCNC: 88 MG/DL (ref 65–99)
GLUCOSE UR STRIP.AUTO-MCNC: NEGATIVE MG/DL
HCG SERPL QL: NEGATIVE
HCT VFR BLD AUTO: 44.8 % (ref 37–47)
HGB BLD-MCNC: 15.3 G/DL (ref 12–16)
HIV 1+2 AB+HIV1 P24 AG SERPL QL IA: NORMAL
IMM GRANULOCYTES # BLD AUTO: 0.16 K/UL (ref 0–0.11)
IMM GRANULOCYTES NFR BLD AUTO: 1.1 % (ref 0–0.9)
KETONES UR STRIP.AUTO-MCNC: NEGATIVE MG/DL
LEUKOCYTE ESTERASE UR QL STRIP.AUTO: NEGATIVE
LIPASE SERPL-CCNC: 19 U/L (ref 11–82)
LYMPHOCYTES # BLD AUTO: 4.12 K/UL (ref 1–4.8)
LYMPHOCYTES NFR BLD: 27.9 % (ref 22–41)
MCH RBC QN AUTO: 30.7 PG (ref 27–33)
MCHC RBC AUTO-ENTMCNC: 34.2 G/DL (ref 32.2–35.5)
MCV RBC AUTO: 89.8 FL (ref 81.4–97.8)
MICRO URNS: NORMAL
MONOCYTES # BLD AUTO: 1.26 K/UL (ref 0–0.85)
MONOCYTES NFR BLD AUTO: 8.5 % (ref 0–13.4)
NEUTROPHILS # BLD AUTO: 8.8 K/UL (ref 1.82–7.42)
NEUTROPHILS NFR BLD: 59.7 % (ref 44–72)
NITRITE UR QL STRIP.AUTO: NEGATIVE
NRBC # BLD AUTO: 0 K/UL
NRBC BLD-RTO: 0 /100 WBC (ref 0–0.2)
PH UR STRIP.AUTO: 5.5 [PH] (ref 5–8)
PLATELET # BLD AUTO: 331 K/UL (ref 164–446)
PMV BLD AUTO: 10.8 FL (ref 9–12.9)
POTASSIUM SERPL-SCNC: 4.4 MMOL/L (ref 3.6–5.5)
PROT SERPL-MCNC: 7 G/DL (ref 6–8.2)
PROT UR QL STRIP: NEGATIVE MG/DL
RBC # BLD AUTO: 4.99 M/UL (ref 4.2–5.4)
RBC UR QL AUTO: NEGATIVE
SODIUM SERPL-SCNC: 139 MMOL/L (ref 135–145)
SP GR UR STRIP.AUTO: 1.01
T PALLIDUM AB SER QL IA: NORMAL
T VAGINALIS DNA VAG QL PROBE+SIG AMP: NEGATIVE
UROBILINOGEN UR STRIP.AUTO-MCNC: 0.2 MG/DL
WBC # BLD AUTO: 14.8 K/UL (ref 4.8–10.8)

## 2023-11-09 PROCEDURE — 36415 COLL VENOUS BLD VENIPUNCTURE: CPT

## 2023-11-09 PROCEDURE — 85025 COMPLETE CBC W/AUTO DIFF WBC: CPT

## 2023-11-09 PROCEDURE — 87510 GARDNER VAG DNA DIR PROBE: CPT

## 2023-11-09 PROCEDURE — 87660 TRICHOMONAS VAGIN DIR PROBE: CPT

## 2023-11-09 PROCEDURE — 87591 N.GONORRHOEAE DNA AMP PROB: CPT

## 2023-11-09 PROCEDURE — 86780 TREPONEMA PALLIDUM: CPT

## 2023-11-09 PROCEDURE — 87389 HIV-1 AG W/HIV-1&-2 AB AG IA: CPT

## 2023-11-09 PROCEDURE — 99284 EMERGENCY DEPT VISIT MOD MDM: CPT

## 2023-11-09 PROCEDURE — 83690 ASSAY OF LIPASE: CPT

## 2023-11-09 PROCEDURE — 87480 CANDIDA DNA DIR PROBE: CPT

## 2023-11-09 PROCEDURE — 76830 TRANSVAGINAL US NON-OB: CPT

## 2023-11-09 PROCEDURE — 81003 URINALYSIS AUTO W/O SCOPE: CPT

## 2023-11-09 PROCEDURE — 87491 CHLMYD TRACH DNA AMP PROBE: CPT

## 2023-11-09 PROCEDURE — 84703 CHORIONIC GONADOTROPIN ASSAY: CPT

## 2023-11-09 PROCEDURE — 80053 COMPREHEN METABOLIC PANEL: CPT

## 2023-11-09 ASSESSMENT — LIFESTYLE VARIABLES: DO YOU DRINK ALCOHOL: NO

## 2023-11-09 ASSESSMENT — FIBROSIS 4 INDEX: FIB4 SCORE: 0.24

## 2023-11-10 LAB
C TRACH DNA GENITAL QL NAA+PROBE: NEGATIVE
N GONORRHOEA DNA GENITAL QL NAA+PROBE: NEGATIVE
SPECIMEN SOURCE: NORMAL

## 2023-11-10 NOTE — ED PROVIDER NOTES
ED Provider Note    CHIEF COMPLAINT  Chief Complaint   Patient presents with    Abdominal Pain     Lower abdominal pain - believes it may be her IUD     Cramping       EXTERNAL RECORDS REVIEWED  Inpatient Notes patient had ruptured ectopic in February 2023    HPI/ROS  LIMITATION TO HISTORY   Select: : None  OUTSIDE HISTORIAN(S):  None    Chen Giovana Gupta is a 23 y.o. female who presents for evaluation of intermittent abdominal cramping that has been ongoing since she had her IUD placed in August.  She states that the current abdominal cramping started today.  She also feels bloated.  She is concerned that her IUD may be displaced.  She states that the last time that she had cramping was October 19 when she had her period.  She states that this pain feels the same.  She denies any fever, chills, nausea, vomiting, dysuria, hematuria, anorexia, diarrhea.  She states that she has had some vaginal discharge since she had the IUD placed.  She is monogamous with her  and has never had STI.  She has no concerns for STI.  She states that she had an ectopic pregnancy earlier this year.  She otherwise has had no abdominal surgeries.    PAST MEDICAL HISTORY   She has history of ectopic pregnancy    SURGICAL HISTORY   has a past surgical history that includes lap,diagnostic abdomen (Left, 1/31/2023) and salpingectomy (Left, 1/31/2023).    FAMILY HISTORY  Family History   Problem Relation Age of Onset    No Known Problems Mother     Heart Disease Father     Diabetes Sister        SOCIAL HISTORY  Social History     Tobacco Use    Smoking status: Never    Smokeless tobacco: Never   Vaping Use    Vaping Use: Never used   Substance and Sexual Activity    Alcohol use: Not Currently    Drug use: No    Sexual activity: Yes     Partners: Male     Birth control/protection: None       CURRENT MEDICATIONS  Home Medications       Reviewed by Alexandra Gomez R.N. (Registered Nurse) on 11/09/23 at 1907  Med List Status: Not  "Addressed     Medication Last Dose Status   COLLAGEN PO  Active   ibuprofen (MOTRIN) 600 MG Tab  Active   Prenatal Vit-Fe Fumarate-FA (PRENATAL PO)  Active   sulfamethoxazole-trimethoprim (BACTRIM DS) 800-160 MG tablet  Active                    ALLERGIES  No Known Allergies    PHYSICAL EXAM  VITAL SIGNS: /47   Pulse 65   Temp 36.7 °C (98.1 °F) (Temporal)   Resp 18   Ht 1.6 m (5' 3\")   Wt 109 kg (240 lb)   SpO2 99%   BMI 42.51 kg/m²      Constitutional: Well developed, Well nourished, No acute distress, Non-toxic appearance.   HEENT: Normocephalic, Atraumatic,  external ears normal, pharynx pink,  Mucous  Membranes moist, No rhinorrhea or mucosal edema  Eyes: PERRL, EOMI, Conjunctiva normal, No discharge.   Neck: Normal range of motion, No tenderness, Supple, No stridor.   Cardiovascular: Regular Rate and Rhythm, No murmurs,  rubs, or gallops.   Thorax & Lungs: Lungs clear to auscultation bilaterally, No respiratory distress, No wheezes, rhales or rhonchi, No chest wall tenderness.   Abdomen: Bowel sounds normal, Soft, suprapubic tenderness to palpation, no right lower quadrant tenderness to palpation non distended,  No pulsatile masses., no rebound guarding or peritoneal signs.   Skin: Warm, Dry, No erythema, No rash,   Back:  No CVA tenderness,  No spinal tenderness, bony crepitance step offs or instability.   Pelvic: With female nurse chaperone, normal external female genitalia, normal vaginal vault, physiologic discharge seen in vaginal vault, IUD strings seen at cervical os, some discomfort with touching cervix but no significant cervical motion tenderness  Extremities: Equal, intact distal pulses, No cyanosis, clubbing or edema,  No tenderness.   Musculoskeletal: Good range of motion in all major joints. No tenderness to palpation or major deformities noted.   Neurologic: Alert & oriented No focal deficits noted.  Psychiatric: Affect normal, Judgment normal, Mood normal.      DIAGNOSTIC STUDIES / " PROCEDURES      LABS  Results for orders placed or performed during the hospital encounter of 11/09/23   CBC WITH DIFFERENTIAL   Result Value Ref Range    WBC 14.8 (H) 4.8 - 10.8 K/uL    RBC 4.99 4.20 - 5.40 M/uL    Hemoglobin 15.3 12.0 - 16.0 g/dL    Hematocrit 44.8 37.0 - 47.0 %    MCV 89.8 81.4 - 97.8 fL    MCH 30.7 27.0 - 33.0 pg    MCHC 34.2 32.2 - 35.5 g/dL    RDW 40.6 35.9 - 50.0 fL    Platelet Count 331 164 - 446 K/uL    MPV 10.8 9.0 - 12.9 fL    Neutrophils-Polys 59.70 44.00 - 72.00 %    Lymphocytes 27.90 22.00 - 41.00 %    Monocytes 8.50 0.00 - 13.40 %    Eosinophils 2.00 0.00 - 6.90 %    Basophils 0.80 0.00 - 1.80 %    Immature Granulocytes 1.10 (H) 0.00 - 0.90 %    Nucleated RBC 0.00 0.00 - 0.20 /100 WBC    Neutrophils (Absolute) 8.80 (H) 1.82 - 7.42 K/uL    Lymphs (Absolute) 4.12 1.00 - 4.80 K/uL    Monos (Absolute) 1.26 (H) 0.00 - 0.85 K/uL    Eos (Absolute) 0.30 0.00 - 0.51 K/uL    Baso (Absolute) 0.12 0.00 - 0.12 K/uL    Immature Granulocytes (abs) 0.16 (H) 0.00 - 0.11 K/uL    NRBC (Absolute) 0.00 K/uL   COMP METABOLIC PANEL   Result Value Ref Range    Sodium 139 135 - 145 mmol/L    Potassium 4.4 3.6 - 5.5 mmol/L    Chloride 106 96 - 112 mmol/L    Co2 22 20 - 33 mmol/L    Anion Gap 11.0 7.0 - 16.0    Glucose 88 65 - 99 mg/dL    Bun 12 8 - 22 mg/dL    Creatinine 0.45 (L) 0.50 - 1.40 mg/dL    Calcium 8.9 8.5 - 10.5 mg/dL    Correct Calcium 8.7 8.5 - 10.5 mg/dL    AST(SGOT) 15 12 - 45 U/L    ALT(SGPT) 27 2 - 50 U/L    Alkaline Phosphatase 62 30 - 99 U/L    Total Bilirubin 0.2 0.1 - 1.5 mg/dL    Albumin 4.2 3.2 - 4.9 g/dL    Total Protein 7.0 6.0 - 8.2 g/dL    Globulin 2.8 1.9 - 3.5 g/dL    A-G Ratio 1.5 g/dL   LIPASE   Result Value Ref Range    Lipase 19 11 - 82 U/L   URINALYSIS,CULTURE IF INDICATED    Specimen: Urine   Result Value Ref Range    Color Yellow     Character Clear     Specific Gravity 1.011 <1.035    Ph 5.5 5.0 - 8.0    Glucose Negative Negative mg/dL    Ketones Negative Negative mg/dL     Protein Negative Negative mg/dL    Bilirubin Negative Negative    Urobilinogen, Urine 0.2 Negative    Nitrite Negative Negative    Leukocyte Esterase Negative Negative    Occult Blood Negative Negative    Micro Urine Req see below    Chlamydia/GC, PCR (Genital/Anal swab)    Specimen: Genital   Result Value Ref Range    C. trachomatis by PCR Negative Negative    N. gonorrhoeae by PCR Negative Negative    Source Vaginal    HIV AG/AB COMBO ASSAY SCREENING   Result Value Ref Range    HIV Ag/Ab Combo Assay Non-Reactive Non Reactive   T.PALLIDUM AB IRIS (SCREENING)   Result Value Ref Range    Syphilis, Treponemal Qual Non-Reactive Non-Reactive   HCG QUAL SERUM   Result Value Ref Range    Beta-Hcg Qualitative Serum Negative Negative   ESTIMATED GFR   Result Value Ref Range    GFR (CKD-EPI) 138 >60 mL/min/1.73 m 2   VAGINAL PATHOGENS DNA PANEL   Result Value Ref Range    Candida species DNA Probe Negative Negative    Trichamonas vaginalis DNA Probe Negative Negative    Gardnerella vaginalis DNA Probe Negative Negative         RADIOLOGY  I have independently interpreted the diagnostic imaging associated with this visit and am waiting the final reading from the radiologist.   My preliminary interpretation is as follows: IUD in uterus  Radiologist interpretation:   US-PELVIC COMPLETE (TRANSABDOMINAL/TRANSVAGINAL) (COMBO)   Final Result         1.  Hypoechoic left ovarian lesion, could represent hemorrhagic cyst or endometrioma, otherwise indeterminate. Follow-up in 6 weeks with repeat pelvic ultrasound for evaluation of stability.   2.  IUD is identified, in the lower uterine segment, could represent incompletely inserted or partially expulsed IUD.            COURSE & MEDICAL DECISION MAKING    ED Observation Status? Yes; I am placing the patient in to an observation status due to a diagnostic uncertainty as well as therapeutic intensity. Patient placed in observation status at 8:27 PM, 11/9/2023.     Observation plan is as  follows: Labs, imaging, serial abdominal exams    10:27 PM I discussed the patient's case with OB/GYN, Dr. Wilcox, as ultrasound shows that IUD is in the lower uterine segment.  Dr. Wilcox states that if patient wishes, IUD can be removed in the emergency room however patient can also follow-up with GYN in the clinic if she wishes to have IUD removed by them.  She states that this does not need to be removed emergently.    11:05 PM patient was reevaluated bedside.  Repeat abdominal exam is benign.  I discussed ultrasound results with the patient.  I offered to remove IUD or told patient that I had also discussed with GYN and they said that they would remove it in clinic if she preferred that.  She prefers to have IUD removed by GYN in clinic.  Labs were discussed with the patient.  She does have a leukocytosis of 14,800 but she has no right lower quadrant tenderness to palpation, no fever, doubt appendicitis.  Discussed with patient that chlamydia and gonorrhea tests are pending at time of discharge.  She will be called if they are positive.  I have advised her to follow-up with her GYN and she was provided phone number.  Strict ER return precautions were discussed including fever, worsening pain or any other concerns.  Patient is agreeable to discharge plan with no further questions.    Upon Reevaluation, the patient's condition has: Improved; and will be discharged.    Patient discharged from ED Observation status at 11:05 PM (Time) 11/09/2023 (Date).     INITIAL ASSESSMENT, COURSE AND PLAN  Care Narrative: This is a 23-year-old female presenting for evaluation of abdominal cramping.  On arrival her vital signs are stable.  She is nontoxic in appearance.  She has suprapubic tenderness palpation but no right lower quadrant tenderness to palpation.  Patient has had intermittent pain like this since her IUD was placed therefore concern for uterine perforation, IUD displacement, IUD embedded in the uterus.    Ultrasound was  obtained and the IUD is in the lower uterine segment which may mean partially exposed or incompletely inserted IUD.  I did discuss with GYN as above who states that patient can follow-up in clinic if she wishes for IUD removal or she can have it removed here in the emergency room.  Patient was provided both options and prefers to follow-up with GYN in the clinic.  Patient is sexually active with her  but has been having some vaginal discharge since IUD was placed.  She has no significant cervical motion tenderness on exam to suggest PID.  She has never had STI.  STI testing was performed however treatment was deferred until results come back.  Consider UTI however UA negative for infection.  There is leukocytosis of 14,800 however her vital signs are normal.  LFTs within normal limits.  Kidney function is normal.  She is not pregnant therefore ectopic pregnancy ruled out.    Suspect that her pain is likely related to discomfort from the IUD.  I do not think that she has pelvic inflammatory disease.  She will follow-up with GYN to have the IUD removed.  Strict ER return precautions were discussed including fever, right lower quadrant abdominal pain, any other concerns.  Patient is agreeable to discharge plan with no further questions.          DISPOSITION AND DISCUSSIONS  I have discussed management of the patient with the following physicians and CRISELDA's:    OB/GYN - Dr Wilcox    Discussion of management with other QHP or appropriate source(s):  None      Escalation of care considered, and ultimately not performed:diagnostic imaging such as CT scan of the abdomen given leukocytosis and lower abdominal pain but she has no right lower quadrant tenderness to palpation, low suspicion for appendicitis    Barriers to care at this time, including but not limited to:  None .     Decision tools and prescription drugs considered including, but not limited to:  None .    Patient discharged home in stable condition with  instructions to follow-up with OB/GYN.  Strict ER return precautions were discussed.  Patient is agreeable to discharge plan with no further questions.    FINAL DIAGNOSIS  1. IUD check up    2. Abdominal cramping           Electronically signed by: Cristina Reyna M.D., 11/9/2023 8:29 PM

## 2023-11-10 NOTE — ED TRIAGE NOTES
"Chief Complaint   Patient presents with    Abdominal Pain     Lower abdominal pain - believes it may be her IUD     Cramping     Pt to triage for above complaint. Pt states she's had abdominal cramping since having her IUD placed a couple of months ago. Pt is concerned that her IUD may be displaced. Pt also reports her  can feel the IUD during intercourse. Protocol ordered.     Pt educated on triage process and placed in lobby.      /47   Pulse 65   Temp 36.7 °C (98.1 °F) (Temporal)   Resp 18   Ht 1.6 m (5' 3\")   Wt 109 kg (240 lb)   SpO2 99%       "

## 2023-11-10 NOTE — DISCHARGE INSTRUCTIONS
You were seen in the emergency room for evaluation of abdominal cramping.  An ultrasound was done and your IUD appears to be in the lower uterine segment.  Please follow-up with GYN to have the IUD removed.    You were also tested for chlamydia and gonorrhea.  These tests are pending at the time of discharge.  If they are positive, you will be called.  Please return to emergency room for any fever, worsening pain or any other concerns.

## 2023-11-30 ENCOUNTER — GYNECOLOGY VISIT (OUTPATIENT)
Dept: OBGYN | Facility: CLINIC | Age: 23
End: 2023-11-30
Payer: COMMERCIAL

## 2023-11-30 VITALS — BODY MASS INDEX: 43.22 KG/M2 | WEIGHT: 244 LBS

## 2023-11-30 DIAGNOSIS — Z30.432 ENCOUNTER FOR IUD REMOVAL: ICD-10-CM

## 2023-11-30 DIAGNOSIS — Z31.69 ENCOUNTER FOR PRECONCEPTION CONSULTATION: ICD-10-CM

## 2023-11-30 PROCEDURE — 58301 REMOVE INTRAUTERINE DEVICE: CPT | Performed by: OBSTETRICS & GYNECOLOGY

## 2023-11-30 RX ORDER — PNV NO.95/FERROUS FUM/FOLIC AC 28MG-0.8MG
1 TABLET ORAL DAILY
Qty: 90 TABLET | Refills: 3 | Status: SHIPPED | OUTPATIENT
Start: 2023-11-30

## 2023-11-30 ASSESSMENT — ENCOUNTER SYMPTOMS
NEUROLOGICAL NEGATIVE: 1
CARDIOVASCULAR NEGATIVE: 1
RESPIRATORY NEGATIVE: 1
MUSCULOSKELETAL NEGATIVE: 1
EYES NEGATIVE: 1
PSYCHIATRIC NEGATIVE: 1
GASTROINTESTINAL NEGATIVE: 1
CONSTITUTIONAL NEGATIVE: 1

## 2023-11-30 ASSESSMENT — FIBROSIS 4 INDEX: FIB4 SCORE: 0.21

## 2023-11-30 NOTE — PROCEDURES
IUD Removal    Date/Time: 11/30/2023 11:48 AM    Performed by: Stanley Du M.D.  Authorized by: Stanley Du M.D.    Consent:     Consent obtained:  Written    Consent given by:  Patient    Procedure risks and benefits discussed: yes      Patient questions answered: yes      Patient agrees, verbalizes understanding, and wants to proceed: yes      Educational handouts given: yes      Instructions and paperwork completed: yes    Pre-procedure details:     Reason for removal: pelvic pain      IUD placed at this facility: no      Length of time IUD in place:  1 month  Procedure:     Pelvic exam performed: yes      Speculum placed: yes      IUD strings visualized in external os: yes      Removal mechanism:  Forceps    IUD removed intact: yes      IUD type removed:  Mirena    Removal complications: no    Post-procedure:     New birth control prescribed: no      Counseling regarding contraception given: yes      Stanley Du M.D.

## 2023-11-30 NOTE — PROGRESS NOTES
GYN PROBLEM VISIT    CC:  No chief complaint on file.    HPI: Patient is a 23 y.o.  who complains of pelvic pain since IUD placement. She presented to the ED for this and it was found that she had a low lying IUD. She has a history of an ectopic pregnancy earlier in the year and is very worried about the possibility of another ectopic. She does however want to conceive again this year. We discussed that a history of an ectopic is a risk factor for an ectopic, but that she will likely have a normal pregnancy. It was recommended that she start a prenatal vitamin since she will be susceptible to pregnancy and a prescription was provided. We discussed that at her positive pregnancy test, she should call the clinic to be seen so hcgs can be trended and she can have an earlier ultrasound.      ROS:   Review of Systems   Constitutional: Negative.    HENT: Negative.     Eyes: Negative.    Respiratory: Negative.     Cardiovascular: Negative.    Gastrointestinal: Negative.    Genitourinary:         Pelvic pain since IUD insertion    Musculoskeletal: Negative.    Skin: Negative.    Neurological: Negative.    Endo/Heme/Allergies: Negative.    Psychiatric/Behavioral: Negative.           PFSH:  I personally reviewed the past medical and surgical histories.     Social History     Tobacco Use    Smoking status: Never    Smokeless tobacco: Never   Vaping Use    Vaping Use: Never used   Substance Use Topics    Alcohol use: Not Currently    Drug use: No       Social History     Substance and Sexual Activity   Sexual Activity Yes    Partners: Male    Birth control/protection: None        ALLERGIES / REACTIONS:  No Known Allergies                        PHYSICAL EXAMINATION:  Vital Signs:   Vitals:    23 1041   Weight: 244 lb     Body mass index is 43.22 kg/m².    Physical Exam  Vitals reviewed. Exam conducted with a chaperone present.   Constitutional:       Appearance: Normal appearance.   HENT:      Head: Normocephalic.    Eyes:      Extraocular Movements: Extraocular movements intact.      Pupils: Pupils are equal, round, and reactive to light.   Cardiovascular:      Rate and Rhythm: Normal rate.   Pulmonary:      Effort: Pulmonary effort is normal.   Abdominal:      General: Abdomen is flat.      Palpations: Abdomen is soft.   Genitourinary:     General: Normal vulva.      Exam position: Lithotomy position.      Vagina: Normal.      Comments: IUD body was sticking out of the cervical os. It was removed intact with complication (see note)  Musculoskeletal:         General: Normal range of motion.      Cervical back: Normal range of motion.   Skin:     General: Skin is warm and dry.   Neurological:      General: No focal deficit present.      Mental Status: She is alert and oriented to person, place, and time.   Psychiatric:         Mood and Affect: Mood normal.         Behavior: Behavior normal.          ASSESSMENT AND PLAN:  23 y.o.  who presents for IUD removal and to discuss future pregnancy plans     1. Encounter for IUD removal  - Consent for all Surgical, Special Diagnostic or Therapeutic Procedures    2. Encounter for preconception consultation  - Prenatal Vit-Fe Fumarate-FA (PRENATAL VITAMINS) 28-0.8 MG Tab; Take 1 Tablet by mouth every day.  Dispense: 90 Tablet; Refill: 3    Plan:  IUD was removed  She has a history of an ectopic earlier this year. She desires another pregnancy, but also is worried about another ectopic. We discussed that when she becomes pregnant, we can trend her hcgs and do an earlier ultrasound  A prenatal vitamin was ordered    At least 20 minutes were spent counseling the patient prior to IUD removal    Stanley Du M.D.  Obstetrics & Gynecology   [id number]  11:42 AM

## 2024-02-23 ENCOUNTER — OFFICE VISIT (OUTPATIENT)
Dept: OBGYN | Facility: CLINIC | Age: 24
End: 2024-02-23
Payer: COMMERCIAL

## 2024-02-23 ENCOUNTER — HOSPITAL ENCOUNTER (OUTPATIENT)
Dept: LAB | Facility: MEDICAL CENTER | Age: 24
End: 2024-02-23
Attending: OBSTETRICS & GYNECOLOGY
Payer: COMMERCIAL

## 2024-02-23 VITALS
SYSTOLIC BLOOD PRESSURE: 120 MMHG | HEIGHT: 64 IN | WEIGHT: 228.2 LBS | BODY MASS INDEX: 38.96 KG/M2 | DIASTOLIC BLOOD PRESSURE: 56 MMHG

## 2024-02-23 DIAGNOSIS — Z30.09 BIRTH CONTROL COUNSELING: Primary | ICD-10-CM

## 2024-02-23 DIAGNOSIS — Z20.2 STD EXPOSURE: ICD-10-CM

## 2024-02-23 LAB
HAV IGM SERPL QL IA: NORMAL
HBV CORE IGM SER QL: NORMAL
HBV SURFACE AG SER QL: NORMAL
HCV AB SER QL: NORMAL
HIV 1+2 AB+HIV1 P24 AG SERPL QL IA: NORMAL
POCT INT CON NEG: NEGATIVE
POCT INT CON POS: POSITIVE
POCT URINE PREGNANCY TEST: NEGATIVE

## 2024-02-23 PROCEDURE — 99214 OFFICE O/P EST MOD 30 MIN: CPT | Performed by: OBSTETRICS & GYNECOLOGY

## 2024-02-23 PROCEDURE — 86780 TREPONEMA PALLIDUM: CPT

## 2024-02-23 PROCEDURE — 80074 ACUTE HEPATITIS PANEL: CPT

## 2024-02-23 PROCEDURE — 81025 URINE PREGNANCY TEST: CPT | Performed by: OBSTETRICS & GYNECOLOGY

## 2024-02-23 PROCEDURE — 3078F DIAST BP <80 MM HG: CPT | Performed by: OBSTETRICS & GYNECOLOGY

## 2024-02-23 PROCEDURE — 3074F SYST BP LT 130 MM HG: CPT | Performed by: OBSTETRICS & GYNECOLOGY

## 2024-02-23 PROCEDURE — 87389 HIV-1 AG W/HIV-1&-2 AB AG IA: CPT

## 2024-02-23 PROCEDURE — 36415 COLL VENOUS BLD VENIPUNCTURE: CPT

## 2024-02-23 RX ORDER — LEVONORGESTREL AND ETHINYL ESTRADIOL 0.1-0.02MG
1 KIT ORAL DAILY
Qty: 28 TABLET | Refills: 11 | Status: SHIPPED | OUTPATIENT
Start: 2024-02-23

## 2024-02-23 RX ORDER — FERROUS SULFATE 325(65) MG
325 TABLET ORAL DAILY
COMMUNITY

## 2024-02-23 ASSESSMENT — FIBROSIS 4 INDEX: FIB4 SCORE: 0.21

## 2024-02-23 NOTE — PROGRESS NOTES
"GYN FOLLOW UP VISIT    CC:  No chief complaint on file.       HPI: Patient is a 23 y.o.  who presents for discussion of birth control options.   She recently had a Mirena IUD which was placed  last year at [facility]. She reported at the time of placement it was very painful. She then had continued pain and presented to the ER 3 months later and found to have IUD malpositioned in the uterus, and she followed up shortly after and had it removed in our office on 23. Per my colleagues note, \"IUD body was sticking out of the cervical os.\"  She has had intercourse twice in the past month with a prior male partner and she took Plan B after each encounter. Is not on any other birth control currently. Patient's last menstrual period was 2024 (exact date). She is also worried about STD's due to being told her partner was cheating on her.   She is interested in birth control, especially long term birth control. She is afraid of having another ectopic pregnancy. Had ectopic pregnancy on 24 and underwent emergent laparoscopy and left salpingectomy.       - birth control options discussed in detail including pill, patch, ring, shot, implant, IUD.   - discussed use and SE of each option.     - discussed that OCP and ring allow for scheduled monthly periods and or manipulation of cycles if desired to skip periods.  SE profile for each is similar   - dicussed that progesterone only options, namely shot and implant are most associated with weight gain, namely depo.   - discussed benefits of ring, shot, implant in negating need to take daily pill   - discussed abnormal bleeding associated with shot and implants, often being most common reason for stopping/removal   - discussed obesity and unproven impact on contraceptive efficacy   - patient no history of uncontrolled HTN or DM, denies history of migraines with aura or blood clots.   - discussed possible SE: N/V, HA, menstrual changes, weight " "fluctuation, breast tenderness, abdominal pain, anxiety or depression, DVT   - counseled that contraception does not protect against STDs and condom use was recommended    She has had Nexplanon in the past but was bothered by constant irregular bleeding, mood changes and weight gain. She declines depo provera due to possible side effect of weight gain. She thinks the Mirena IUD that she had placed at [facility] was not placed appropriately and considering of getting another Mirena IUD again.            ROS:   General: denies fever / chills  HEENT: denies sore throat:  CV: denies chest pain:  Repiratory: denies shortness of breath  GI: denies abdominal pain  : denies dysuria:    PFSH:  I personally reviewed the past medical and surgical histories.       PHYSICAL EXAMINATION:  Vital Signs:   Vitals:    24 1024   BP: 120/56   BP Location: Right arm   Patient Position: Sitting   BP Cuff Size: Large adult   Weight: 228 lb 3.2 oz   Height: 5' 3.98\"     Body mass index is 39.2 kg/m².    Gen: appears well, NAD  Exam deferred.     UPT negative today    ASSESSMENT AND PLAN:  23 y.o.    1. Birth control counseling  Discussed that since she has had unprotected intercourse within the past month, we need to avoid placing IUD now until we can confirm with negative pregnancy test in 1 month with backup birth control on board. After discussion of options, she wishes to take OCPs as backup birth control. She does desire another trial of Mirena IUD, placed by a gynecologist, and advised her to schedule this in 4-6 weeks. She is to take her OCPs until that time.     - POCT Pregnancy  - levonorgestrel-ethinyl estradiol (AVIANE) 0.1-20 MG-MCG per tablet; Take 1 Tablet by mouth every day.  Dispense: 28 Tablet; Refill: 11    2. STD exposure  Encouraged condom use and STD screening ordered.   - Chlamydia/GC, PCR (Urine); Future  - HIV AG/AB COMBO ASSAY SCREENING; Future  - HEPATITIS PANEL ACUTE(4 COMPONENTS); Future "   -RPR    RTC in 4-6 weeks for Mirena IUD placement.     Time spent: 30 minutes    Cathy Wilcox M.D.

## 2024-02-24 LAB — T PALLIDUM AB SER QL IA: NORMAL

## 2024-03-18 ENCOUNTER — NON-PROVIDER VISIT (OUTPATIENT)
Dept: OBGYN | Facility: CLINIC | Age: 24
End: 2024-03-18
Payer: COMMERCIAL

## 2024-03-18 ENCOUNTER — HOSPITAL ENCOUNTER (OUTPATIENT)
Dept: LAB | Facility: MEDICAL CENTER | Age: 24
End: 2024-03-18
Attending: OBSTETRICS & GYNECOLOGY
Payer: COMMERCIAL

## 2024-03-18 DIAGNOSIS — O36.80X0 PREGNANCY, LOCATION UNKNOWN: ICD-10-CM

## 2024-03-18 DIAGNOSIS — Z32.01 PREGNANCY EXAMINATION OR TEST, POSITIVE RESULT: ICD-10-CM

## 2024-03-18 LAB
B-HCG SERPL-ACNC: ABNORMAL MIU/ML (ref 0–5)
POCT INT CON NEG: NEGATIVE
POCT INT CON POS: POSITIVE
POCT URINE PREGNANCY TEST: POSITIVE

## 2024-03-18 PROCEDURE — 36415 COLL VENOUS BLD VENIPUNCTURE: CPT

## 2024-03-18 PROCEDURE — 84702 CHORIONIC GONADOTROPIN TEST: CPT

## 2024-03-18 PROCEDURE — 81025 URINE PREGNANCY TEST: CPT | Performed by: OBSTETRICS & GYNECOLOGY

## 2024-03-18 NOTE — PROGRESS NOTES
Pt here as a walk-in for a pregnancy test  LMP: 02/02/2024 (pt sure of date)  +UPT today in clinic   GA: 6W3D, AYAN: 11/08/2024    Pt stated she was seen in our office on 02/23/24 with Dr. Wilcox. Who prescribed her OCPs (Pt had  a Negative UPT on 02/23/2024 in our office) Pt stated she started the birth control pills on 02/24/23 and she is on her last week this week. Pt is concerned that she has been taking a lot of contraceptive in a short period of time.  Pt stated she took Plan B 3 times duet to she had intercourse with 2 different partners.  Had intercourse on 02/15/24 had Plan B the same day. Had intercourse 02/18/24 took Plan B on 02/20/24, had intercourse on 02/27/24 took Plan B on 02/28/24.   Pt is concerned about an ectopic pregnancy due to she is experiencing cramping for I week, having nausea and dizziness.   I told pt that I needed to consult with a provider to giver her a medical advised.    I consulted with Dr. Winston and informed him the above. Dr. Winston ordered HCG Quants.get the first order done today and then in 48 hrs.  To advised pt to watch out for pain, and bleeding and to go to the hospital if she is having any of these symptoms or her cramping does not go away.   Pt informed and verbalized understating. Pt had no further questions.

## 2024-03-19 ENCOUNTER — APPOINTMENT (OUTPATIENT)
Dept: RADIOLOGY | Facility: MEDICAL CENTER | Age: 24
End: 2024-03-19
Attending: STUDENT IN AN ORGANIZED HEALTH CARE EDUCATION/TRAINING PROGRAM
Payer: COMMERCIAL

## 2024-03-19 ENCOUNTER — HOSPITAL ENCOUNTER (EMERGENCY)
Facility: MEDICAL CENTER | Age: 24
End: 2024-03-19
Attending: STUDENT IN AN ORGANIZED HEALTH CARE EDUCATION/TRAINING PROGRAM
Payer: COMMERCIAL

## 2024-03-19 VITALS
HEIGHT: 62 IN | WEIGHT: 231.26 LBS | DIASTOLIC BLOOD PRESSURE: 60 MMHG | SYSTOLIC BLOOD PRESSURE: 119 MMHG | BODY MASS INDEX: 42.56 KG/M2 | RESPIRATION RATE: 14 BRPM | HEART RATE: 55 BPM | OXYGEN SATURATION: 98 % | TEMPERATURE: 98.3 F

## 2024-03-19 DIAGNOSIS — N93.9 VAGINAL BLEEDING: ICD-10-CM

## 2024-03-19 DIAGNOSIS — O41.8X10 SUBCHORIONIC HEMATOMA IN FIRST TRIMESTER, SINGLE OR UNSPECIFIED FETUS: ICD-10-CM

## 2024-03-19 DIAGNOSIS — O46.8X1 SUBCHORIONIC HEMATOMA IN FIRST TRIMESTER, SINGLE OR UNSPECIFIED FETUS: ICD-10-CM

## 2024-03-19 LAB
ALBUMIN SERPL BCP-MCNC: 4.1 G/DL (ref 3.2–4.9)
ALBUMIN/GLOB SERPL: 1.4 G/DL
ALP SERPL-CCNC: 63 U/L (ref 30–99)
ALT SERPL-CCNC: 34 U/L (ref 2–50)
ANION GAP SERPL CALC-SCNC: 12 MMOL/L (ref 7–16)
APPEARANCE UR: CLEAR
AST SERPL-CCNC: 18 U/L (ref 12–45)
B-HCG SERPL-ACNC: ABNORMAL MIU/ML (ref 0–5)
BASOPHILS # BLD AUTO: 0.7 % (ref 0–1.8)
BASOPHILS # BLD: 0.11 K/UL (ref 0–0.12)
BILIRUB SERPL-MCNC: 0.2 MG/DL (ref 0.1–1.5)
BILIRUB UR QL STRIP.AUTO: NEGATIVE
BUN SERPL-MCNC: 11 MG/DL (ref 8–22)
CALCIUM ALBUM COR SERPL-MCNC: 9 MG/DL (ref 8.5–10.5)
CALCIUM SERPL-MCNC: 9.1 MG/DL (ref 8.5–10.5)
CHLORIDE SERPL-SCNC: 102 MMOL/L (ref 96–112)
CO2 SERPL-SCNC: 20 MMOL/L (ref 20–33)
COLOR UR: YELLOW
CREAT SERPL-MCNC: 0.46 MG/DL (ref 0.5–1.4)
EOSINOPHIL # BLD AUTO: 0.2 K/UL (ref 0–0.51)
EOSINOPHIL NFR BLD: 1.3 % (ref 0–6.9)
ERYTHROCYTE [DISTWIDTH] IN BLOOD BY AUTOMATED COUNT: 42.8 FL (ref 35.9–50)
GFR SERPLBLD CREATININE-BSD FMLA CKD-EPI: 137 ML/MIN/1.73 M 2
GLOBULIN SER CALC-MCNC: 3 G/DL (ref 1.9–3.5)
GLUCOSE SERPL-MCNC: 86 MG/DL (ref 65–99)
GLUCOSE UR STRIP.AUTO-MCNC: NEGATIVE MG/DL
HCT VFR BLD AUTO: 43 % (ref 37–47)
HGB BLD-MCNC: 14.4 G/DL (ref 12–16)
IMM GRANULOCYTES # BLD AUTO: 0.07 K/UL (ref 0–0.11)
IMM GRANULOCYTES NFR BLD AUTO: 0.4 % (ref 0–0.9)
KETONES UR STRIP.AUTO-MCNC: NEGATIVE MG/DL
LEUKOCYTE ESTERASE UR QL STRIP.AUTO: NEGATIVE
LIPASE SERPL-CCNC: 12 U/L (ref 11–82)
LYMPHOCYTES # BLD AUTO: 3.74 K/UL (ref 1–4.8)
LYMPHOCYTES NFR BLD: 24 % (ref 22–41)
MCH RBC QN AUTO: 30.6 PG (ref 27–33)
MCHC RBC AUTO-ENTMCNC: 33.5 G/DL (ref 32.2–35.5)
MCV RBC AUTO: 91.3 FL (ref 81.4–97.8)
MICRO URNS: NORMAL
MONOCYTES # BLD AUTO: 1.07 K/UL (ref 0–0.85)
MONOCYTES NFR BLD AUTO: 6.9 % (ref 0–13.4)
NEUTROPHILS # BLD AUTO: 10.42 K/UL (ref 1.82–7.42)
NEUTROPHILS NFR BLD: 66.7 % (ref 44–72)
NITRITE UR QL STRIP.AUTO: NEGATIVE
NRBC # BLD AUTO: 0 K/UL
NRBC BLD-RTO: 0 /100 WBC (ref 0–0.2)
NUMBER OF RH DOSES IND 8505RD: NORMAL
PH UR STRIP.AUTO: 5.5 [PH] (ref 5–8)
PLATELET # BLD AUTO: 327 K/UL (ref 164–446)
PMV BLD AUTO: 11.7 FL (ref 9–12.9)
POTASSIUM SERPL-SCNC: 3.8 MMOL/L (ref 3.6–5.5)
PROT SERPL-MCNC: 7.1 G/DL (ref 6–8.2)
PROT UR QL STRIP: NEGATIVE MG/DL
RBC # BLD AUTO: 4.71 M/UL (ref 4.2–5.4)
RBC UR QL AUTO: NEGATIVE
RH BLD: NORMAL
SODIUM SERPL-SCNC: 134 MMOL/L (ref 135–145)
SP GR UR STRIP.AUTO: 1.02
UROBILINOGEN UR STRIP.AUTO-MCNC: 0.2 MG/DL
WBC # BLD AUTO: 15.6 K/UL (ref 4.8–10.8)

## 2024-03-19 PROCEDURE — 86901 BLOOD TYPING SEROLOGIC RH(D): CPT

## 2024-03-19 PROCEDURE — 84702 CHORIONIC GONADOTROPIN TEST: CPT

## 2024-03-19 PROCEDURE — 85025 COMPLETE CBC W/AUTO DIFF WBC: CPT

## 2024-03-19 PROCEDURE — 76801 OB US < 14 WKS SINGLE FETUS: CPT

## 2024-03-19 PROCEDURE — 36415 COLL VENOUS BLD VENIPUNCTURE: CPT

## 2024-03-19 PROCEDURE — 80053 COMPREHEN METABOLIC PANEL: CPT

## 2024-03-19 PROCEDURE — 81003 URINALYSIS AUTO W/O SCOPE: CPT

## 2024-03-19 PROCEDURE — 99284 EMERGENCY DEPT VISIT MOD MDM: CPT

## 2024-03-19 PROCEDURE — 83690 ASSAY OF LIPASE: CPT

## 2024-03-19 ASSESSMENT — FIBROSIS 4 INDEX: FIB4 SCORE: 0.21

## 2024-03-19 NOTE — ED NOTES
Pt understands DC instructions and follow-up, DC paperwork provided, pt ambulated with steady gait to SHAUNNA martinez for DC

## 2024-03-19 NOTE — ED PROVIDER NOTES
ED Provider Note    CHIEF COMPLAINT  Chief Complaint   Patient presents with    Vaginal Bleeding     Pt states that she feels she is having a miscarriage. Pt found out she was pregnant on Saturday but had taken plan B, 3 times earlier in the month. Pt reports spotting but no bleeding since. LMP 2/2.     Pelvic Pain     Pt reporting constant lower abdominal pain that started yesterday. Hx of ectopic pregnancy       EXTERNAL RECORDS REVIEWED  Operative report by Dr. Martha Jacobo, left-sided ectopic pregnancy status post left salpingectomy February 1, 2023    HPI/ROS  LIMITATION TO HISTORY   Select: : None  OUTSIDE HISTORIAN(S):  none    Chen Giovana Gupta is a 23 y.o. female with past medical history of left ectopic pregnancy status post salpingectomy presenting to the emergency department for pelvic cramping, nausea, vomiting.  Says that she has been given Plan B 3 times this month and is currently on oral contraceptive pills, last menstrual period was February 2, says that symptoms are similar to prior when she had an ectopic pregnancy.  Has not been able to get into see an obstetrician over the last several days.  Denies fevers, chills, dysuria, flank pain.    PAST MEDICAL HISTORY       SURGICAL HISTORY   has a past surgical history that includes lap,diagnostic abdomen (Left, 1/31/2023) and salpingectomy (Left, 1/31/2023).    FAMILY HISTORY  Family History   Problem Relation Age of Onset    No Known Problems Mother     Heart Disease Father     Diabetes Sister        SOCIAL HISTORY  Social History     Tobacco Use    Smoking status: Never    Smokeless tobacco: Never   Vaping Use    Vaping Use: Never used   Substance and Sexual Activity    Alcohol use: Yes     Comment: socially    Drug use: No    Sexual activity: Yes     Partners: Male     Birth control/protection: None     Comment:        CURRENT MEDICATIONS  Home Medications       Reviewed by Lexus Crump R.N. (Registered Nurse) on 03/19/24 at  "0022  Med List Status: Partial     Medication Last Dose Status   COLLAGEN PO  Active   ferrous sulfate 325 (65 Fe) MG tablet  Active   ibuprofen (MOTRIN) 600 MG Tab  Active   levonorgestrel-ethinyl estradiol (AVIANE) 0.1-20 MG-MCG per tablet  Active   Prenatal Vit-Fe Fumarate-FA (PRENATAL PO)  Active   Prenatal Vit-Fe Fumarate-FA (PRENATAL VITAMINS) 28-0.8 MG Tab  Active   sulfamethoxazole-trimethoprim (BACTRIM DS) 800-160 MG tablet  Active                    ALLERGIES  No Known Allergies    PHYSICAL EXAM  VITAL SIGNS: /60   Pulse (!) 55   Temp 36.8 °C (98.3 °F) (Temporal)   Resp 14   Ht 1.575 m (5' 2\")   Wt 105 kg (231 lb 4.2 oz)   LMP 02/02/2024 (Exact Date)   SpO2 98%   BMI 42.30 kg/m²    General: Well- appearing , non-toxic, no acute distress  Neuro: oriented x 3, moving all extremities.   HEENT:   - Head: Normocephalic, atraumatic  - Eyes: PERRL  - Ears/Nose: normal external nose and ears  - Mouth: moist mucosal membranes  Resp: clear to auscultation, no increased work of breathing  CV: Regular rate and rhythm  Abd: Soft, mild suprapubic tenderness to palpation.  No rigidity or guarding.  Extremities: No peripheral edema  Psych: lucid and conversational         DIAGNOSTIC STUDIES / PROCEDURES    EKG  My independent EKG interpretation:  No results found for this or any previous visit.    LABS  Results for orders placed or performed during the hospital encounter of 03/19/24   CBC WITH DIFFERENTIAL   Result Value Ref Range    WBC 15.6 (H) 4.8 - 10.8 K/uL    RBC 4.71 4.20 - 5.40 M/uL    Hemoglobin 14.4 12.0 - 16.0 g/dL    Hematocrit 43.0 37.0 - 47.0 %    MCV 91.3 81.4 - 97.8 fL    MCH 30.6 27.0 - 33.0 pg    MCHC 33.5 32.2 - 35.5 g/dL    RDW 42.8 35.9 - 50.0 fL    Platelet Count 327 164 - 446 K/uL    MPV 11.7 9.0 - 12.9 fL    Neutrophils-Polys 66.70 44.00 - 72.00 %    Lymphocytes 24.00 22.00 - 41.00 %    Monocytes 6.90 0.00 - 13.40 %    Eosinophils 1.30 0.00 - 6.90 %    Basophils 0.70 0.00 - 1.80 %    " Immature Granulocytes 0.40 0.00 - 0.90 %    Nucleated RBC 0.00 0.00 - 0.20 /100 WBC    Neutrophils (Absolute) 10.42 (H) 1.82 - 7.42 K/uL    Lymphs (Absolute) 3.74 1.00 - 4.80 K/uL    Monos (Absolute) 1.07 (H) 0.00 - 0.85 K/uL    Eos (Absolute) 0.20 0.00 - 0.51 K/uL    Baso (Absolute) 0.11 0.00 - 0.12 K/uL    Immature Granulocytes (abs) 0.07 0.00 - 0.11 K/uL    NRBC (Absolute) 0.00 K/uL   COMP METABOLIC PANEL   Result Value Ref Range    Sodium 134 (L) 135 - 145 mmol/L    Potassium 3.8 3.6 - 5.5 mmol/L    Chloride 102 96 - 112 mmol/L    Co2 20 20 - 33 mmol/L    Anion Gap 12.0 7.0 - 16.0    Glucose 86 65 - 99 mg/dL    Bun 11 8 - 22 mg/dL    Creatinine 0.46 (L) 0.50 - 1.40 mg/dL    Calcium 9.1 8.5 - 10.5 mg/dL    Correct Calcium 9.0 8.5 - 10.5 mg/dL    AST(SGOT) 18 12 - 45 U/L    ALT(SGPT) 34 2 - 50 U/L    Alkaline Phosphatase 63 30 - 99 U/L    Total Bilirubin 0.2 0.1 - 1.5 mg/dL    Albumin 4.1 3.2 - 4.9 g/dL    Total Protein 7.1 6.0 - 8.2 g/dL    Globulin 3.0 1.9 - 3.5 g/dL    A-G Ratio 1.4 g/dL   LIPASE   Result Value Ref Range    Lipase 12 11 - 82 U/L   HCG QUANTITATIVE   Result Value Ref Range    Bhcg 96933.0 (H) 0.0 - 5.0 mIU/mL   URINALYSIS,CULTURE IF INDICATED    Specimen: Urine   Result Value Ref Range    Color Yellow     Character Clear     Specific Gravity 1.018 <1.035    Ph 5.5 5.0 - 8.0    Glucose Negative Negative mg/dL    Ketones Negative Negative mg/dL    Protein Negative Negative mg/dL    Bilirubin Negative Negative    Urobilinogen, Urine 0.2 Negative    Nitrite Negative Negative    Leukocyte Esterase Negative Negative    Occult Blood Negative Negative    Micro Urine Req see below    RH Type for Rhogam from E.D.   Result Value Ref Range    Emergency Department Rh Typing POS     Number Of Rh Doses Indicated ZERO    ESTIMATED GFR   Result Value Ref Range    GFR (CKD-EPI) 137 >60 mL/min/1.73 m 2       RADIOLOGY  I have independently interpreted the diagnostic imaging associated with this visit and am  waiting the final reading from the radiologist.   My preliminary interpretation is as follows:   - Ultrasound shows intrauterine pregnancy, small subchorionic hemorrhage  Radiologist interpretation:   US-OB 1ST TRIMESTER SINGLE GEST Is the patient pregnant? Yes   Final Result         1.  Single living intrauterine pregnancy at 6 weeks, 6 days estimated gestational age.   2.  Small subchorionic hemorrhage   3.  Cystic lesion in the right ovary, appearance favoring corpus luteal cyst, otherwise indeterminate.              MEDICAL DECISION MAKING    ED Observation Status? No; Patient does not meet criteria for ED Observation.     ED COURSE AND PLAN    Chen Gupta is a 23 y.o. female presenting to the emergency department for suprapubic and bilateral lower quadrant abdominal cramping present for the last several hours.  History of ectopic pregnancy.  On arrival to the emergency department her vital signs are stable.  Labs reveal slight leukocytosis.  Chemistry unremarkable.  Lipase normal.  hCG elevated at 32,000.   Urine is clean  Plan for ultrasound of the pelvis    ---Pertinent ED Course---:    3:25 AM I reviewed the patient's old records in Epic, medication list, allergies, past medical history and performed a physical examination.     5:00 AM ultrasound of the pelvis shows live intrauterine pregnancy, 6 weeks 6 days, subchorionic hemorrhage explaining her vaginal bleeding.   Patient hemodynamically stable, no indication for OB/GYN consultation in the emergency department.  I reviewed the results of the ultrasound with the patient.  Advised her to follow-up with her OB/GYN.  At this time is appropriate for discharge home, strict return precautions discussed.            Procedures:      ----------------------------------------------------------------------------------  DISCUSSIONS    I have discussed management of the patient with the following physicians and CRISELDA's:      Discussion of management with other  QHP or appropriate source(s):     Escalation of care considered, and ultimately not performed: Considered but no indication for OB/GYN consultation    Barriers to care at this time, including but not limited to:     Decision tools and prescription drugs considered including, but not limited to: Considered but no indication for prescription medication    FINAL IMPRESSION    1. Vaginal bleeding    2. Subchorionic hematoma in first trimester, single or unspecified fetus        Discharge Medication List as of 3/19/2024  4:30 AM            DISPOSITION    Discharge home, Stable      This chart was dictated using an electronic voice recognition software. The chart has been reviewed and edited but there is still possibility for dictation errors due to limitation of software.    Armani [place], DO 3/19/2024

## 2024-03-19 NOTE — ED TRIAGE NOTES
"Chief Complaint   Patient presents with    Vaginal Bleeding     Pt states that she feels she is having a miscarriage. Pt found out she was pregnant on Saturday but had taken plan B, 3 times earlier in the month. Pt reports spotting but no bleeding since. LMP 2/2.     Pelvic Pain     Pt reporting constant lower abdominal pain that started yesterday. Hx of ectopic pregnancy     Abdominal pain/ vag bleed protocol ordered  Pt educated on ED process and asked to wait in lobby. Patient educated on importance of alerting staff to new or worsening symptoms or concerns.  Pt AOx4, GCS 15, VSS  /59   Pulse 64   Temp 36.7 °C (98.1 °F) (Temporal)   Resp 18   Ht 1.575 m (5' 2\")   Wt 105 kg (231 lb 4.2 oz)   LMP 02/02/2024 (Exact Date)   SpO2 99%   BMI 42.30 kg/m²     "

## 2024-03-19 NOTE — DISCHARGE INSTRUCTIONS
You were seen in the emergency department for vaginal bleeding.  Please follow-up with your OB/GYN.  If you are feeling light headed or feel like you are going to pass out due to the bleeding, come back to the emergency department for reevaluation

## 2024-03-30 ENCOUNTER — HOSPITAL ENCOUNTER (EMERGENCY)
Facility: MEDICAL CENTER | Age: 24
End: 2024-03-31
Attending: STUDENT IN AN ORGANIZED HEALTH CARE EDUCATION/TRAINING PROGRAM
Payer: COMMERCIAL

## 2024-03-30 DIAGNOSIS — R10.9 ABDOMINAL CRAMPING AFFECTING PREGNANCY: ICD-10-CM

## 2024-03-30 DIAGNOSIS — R42 DIZZINESS: ICD-10-CM

## 2024-03-30 DIAGNOSIS — D72.829 LEUKOCYTOSIS, UNSPECIFIED TYPE: ICD-10-CM

## 2024-03-30 DIAGNOSIS — O26.899 ABDOMINAL CRAMPING AFFECTING PREGNANCY: ICD-10-CM

## 2024-03-30 PROCEDURE — 36415 COLL VENOUS BLD VENIPUNCTURE: CPT

## 2024-03-30 PROCEDURE — 81003 URINALYSIS AUTO W/O SCOPE: CPT

## 2024-03-30 PROCEDURE — 99284 EMERGENCY DEPT VISIT MOD MDM: CPT

## 2024-03-30 ASSESSMENT — FIBROSIS 4 INDEX: FIB4 SCORE: 0.23

## 2024-03-31 VITALS
HEART RATE: 67 BPM | HEIGHT: 62 IN | RESPIRATION RATE: 16 BRPM | SYSTOLIC BLOOD PRESSURE: 122 MMHG | WEIGHT: 235.89 LBS | TEMPERATURE: 98 F | OXYGEN SATURATION: 96 % | BODY MASS INDEX: 43.41 KG/M2 | DIASTOLIC BLOOD PRESSURE: 58 MMHG

## 2024-03-31 LAB
ALBUMIN SERPL BCP-MCNC: 3.9 G/DL (ref 3.2–4.9)
ALBUMIN/GLOB SERPL: 1.4 G/DL
ALP SERPL-CCNC: 67 U/L (ref 30–99)
ALT SERPL-CCNC: 14 U/L (ref 2–50)
ANION GAP SERPL CALC-SCNC: 12 MMOL/L (ref 7–16)
APPEARANCE UR: CLEAR
AST SERPL-CCNC: 12 U/L (ref 12–45)
B-HCG SERPL-ACNC: ABNORMAL MIU/ML (ref 0–5)
BASOPHILS # BLD AUTO: 0.6 % (ref 0–1.8)
BASOPHILS # BLD: 0.1 K/UL (ref 0–0.12)
BILIRUB SERPL-MCNC: <0.2 MG/DL (ref 0.1–1.5)
BILIRUB UR QL STRIP.AUTO: NEGATIVE
BUN SERPL-MCNC: 10 MG/DL (ref 8–22)
CALCIUM ALBUM COR SERPL-MCNC: 9.4 MG/DL (ref 8.5–10.5)
CALCIUM SERPL-MCNC: 9.3 MG/DL (ref 8.5–10.5)
CHLORIDE SERPL-SCNC: 106 MMOL/L (ref 96–112)
CO2 SERPL-SCNC: 21 MMOL/L (ref 20–33)
COLOR UR: YELLOW
CREAT SERPL-MCNC: 0.46 MG/DL (ref 0.5–1.4)
EKG IMPRESSION: NORMAL
EOSINOPHIL # BLD AUTO: 0.25 K/UL (ref 0–0.51)
EOSINOPHIL NFR BLD: 1.6 % (ref 0–6.9)
ERYTHROCYTE [DISTWIDTH] IN BLOOD BY AUTOMATED COUNT: 41.1 FL (ref 35.9–50)
GFR SERPLBLD CREATININE-BSD FMLA CKD-EPI: 137 ML/MIN/1.73 M 2
GLOBULIN SER CALC-MCNC: 2.8 G/DL (ref 1.9–3.5)
GLUCOSE SERPL-MCNC: 95 MG/DL (ref 65–99)
GLUCOSE UR STRIP.AUTO-MCNC: NEGATIVE MG/DL
HCT VFR BLD AUTO: 40.7 % (ref 37–47)
HGB BLD-MCNC: 13.9 G/DL (ref 12–16)
IMM GRANULOCYTES # BLD AUTO: 0.13 K/UL (ref 0–0.11)
IMM GRANULOCYTES NFR BLD AUTO: 0.8 % (ref 0–0.9)
KETONES UR STRIP.AUTO-MCNC: NEGATIVE MG/DL
LEUKOCYTE ESTERASE UR QL STRIP.AUTO: NEGATIVE
LIPASE SERPL-CCNC: 17 U/L (ref 11–82)
LYMPHOCYTES # BLD AUTO: 3.52 K/UL (ref 1–4.8)
LYMPHOCYTES NFR BLD: 22.1 % (ref 22–41)
MCH RBC QN AUTO: 30.5 PG (ref 27–33)
MCHC RBC AUTO-ENTMCNC: 34.2 G/DL (ref 32.2–35.5)
MCV RBC AUTO: 89.5 FL (ref 81.4–97.8)
MICRO URNS: NORMAL
MONOCYTES # BLD AUTO: 1.1 K/UL (ref 0–0.85)
MONOCYTES NFR BLD AUTO: 6.9 % (ref 0–13.4)
NEUTROPHILS # BLD AUTO: 10.8 K/UL (ref 1.82–7.42)
NEUTROPHILS NFR BLD: 68 % (ref 44–72)
NITRITE UR QL STRIP.AUTO: NEGATIVE
NRBC # BLD AUTO: 0 K/UL
NRBC BLD-RTO: 0 /100 WBC (ref 0–0.2)
NUMBER OF RH DOSES IND 8505RD: NORMAL
PH UR STRIP.AUTO: 5.5 [PH] (ref 5–8)
PLATELET # BLD AUTO: 311 K/UL (ref 164–446)
PMV BLD AUTO: 11 FL (ref 9–12.9)
POTASSIUM SERPL-SCNC: 3.5 MMOL/L (ref 3.6–5.5)
PROT SERPL-MCNC: 6.7 G/DL (ref 6–8.2)
PROT UR QL STRIP: NEGATIVE MG/DL
RBC # BLD AUTO: 4.55 M/UL (ref 4.2–5.4)
RBC UR QL AUTO: NEGATIVE
RH BLD: NORMAL
SODIUM SERPL-SCNC: 139 MMOL/L (ref 135–145)
SP GR UR STRIP.AUTO: 1.03
UROBILINOGEN UR STRIP.AUTO-MCNC: 0.2 MG/DL
WBC # BLD AUTO: 15.9 K/UL (ref 4.8–10.8)

## 2024-03-31 PROCEDURE — 86901 BLOOD TYPING SEROLOGIC RH(D): CPT

## 2024-03-31 PROCEDURE — 93005 ELECTROCARDIOGRAM TRACING: CPT | Performed by: STUDENT IN AN ORGANIZED HEALTH CARE EDUCATION/TRAINING PROGRAM

## 2024-03-31 PROCEDURE — 83690 ASSAY OF LIPASE: CPT

## 2024-03-31 PROCEDURE — 85025 COMPLETE CBC W/AUTO DIFF WBC: CPT

## 2024-03-31 PROCEDURE — 80053 COMPREHEN METABOLIC PANEL: CPT

## 2024-03-31 PROCEDURE — 84702 CHORIONIC GONADOTROPIN TEST: CPT

## 2024-03-31 PROCEDURE — 700105 HCHG RX REV CODE 258: Mod: UD | Performed by: STUDENT IN AN ORGANIZED HEALTH CARE EDUCATION/TRAINING PROGRAM

## 2024-03-31 RX ORDER — SODIUM CHLORIDE 9 MG/ML
1000 INJECTION, SOLUTION INTRAVENOUS ONCE
Status: COMPLETED | OUTPATIENT
Start: 2024-03-31 | End: 2024-03-31

## 2024-03-31 RX ADMIN — SODIUM CHLORIDE 1000 ML: 9 INJECTION, SOLUTION INTRAVENOUS at 00:30

## 2024-03-31 ASSESSMENT — LIFESTYLE VARIABLES: DO YOU DRINK ALCOHOL: NO

## 2024-03-31 NOTE — ED NOTES
Reviewed discharge instructions with pt. Verbalized understanding of instructions. Will follow up as directed. Ambulatory out of ER with steady gait.

## 2024-03-31 NOTE — ED PROVIDER NOTES
CHIEF COMPLAINT  Chief Complaint   Patient presents with    Abdominal Pain     Lower quadrant pain described as severe cramping w/ lightheadedness. Reports pain is radiating down legs. Denies vaginal bleeding. Currently 2 months pregnant.        LIMITATION TO HISTORY   Select: None    HPI    Chen Gupta is a 23 y.o. female who presents to the Emergency Department evaluation of lower abdominal cramping which she states has been ongoing for the past 2 to 3 weeks.  She was seen in the emergency department on the  and diagnosed with an intrauterine pregnancy with a noted small subchorionic hemorrhage.   at approximately 8 weeks gestation.  Last menstrual cycle was .  Does have an upcoming appointment with OB gynecology.  Denies any vaginal bleeding.  Today she stated that she began to have some lightheadedness though no syncopal episodes has had some nausea though no vomiting or diarrhea.    OUTSIDE HISTORIAN(S):  Select: None    EXTERNAL RECORDS REVIEWED  Select: Other reviewed the ultrasound from the , does appear to have a live IUP with a small subchorionic hemorrhage subchorionic hemorrhage today noted on bedside appears smaller than previously visualized on her ultrasound on the  reviewed previous Rh+ no need for RhoGAM       PAST MEDICAL HISTORY  History reviewed. No pertinent past medical history.  .    SURGICAL HISTORY  Past Surgical History:   Procedure Laterality Date    IL LAP,DIAGNOSTIC ABDOMEN Left 2023    Procedure: PELVISCOPY FOR RUPTURED ECTOPIC;  Surgeon: Martha Jacobo D.O.;  Location: SURGERY [facility];  Service: Obstetrics    SALPINGECTOMY Left 2023    Procedure: SALPINGECTOMY;  Surgeon: Martha Jacobo D.O.;  Location: SURGERY [facility];  Service: Obstetrics         FAMILY HISTORY  Family History   Problem Relation Age of Onset    No Known Problems Mother     Heart Disease Father     Diabetes Sister           SOCIAL HISTORY  Social  History     Socioeconomic History    Marital status:      Spouse name: Not on file    Number of children: Not on file    Years of education: Not on file    Highest education level: Not on file   Occupational History    Not on file   Tobacco Use    Smoking status: Never    Smokeless tobacco: Never   Vaping Use    Vaping Use: Never used   Substance and Sexual Activity    Alcohol use: Yes     Comment: socially    Drug use: No    Sexual activity: Yes     Partners: Male     Birth control/protection: None     Comment:    Other Topics Concern    Behavioral problems Not Asked    Interpersonal relationships Not Asked    Sad or not enjoying activities Not Asked    Suicidal thoughts Not Asked    Poor school performance Not Asked    Reading difficulties Not Asked    Speech difficulties Not Asked    Writing difficulties Not Asked    Inadequate sleep Not Asked    Excessive TV viewing Not Asked    Excessive video game use Not Asked    Inadequate exercise Not Asked    Sports related Not Asked    Poor diet Not Asked    Family concerns for drug/alcohol abuse Not Asked    Poor oral hygiene Not Asked    Bike safety Not Asked    Family concerns vehicle safety Not Asked   Social History Narrative    Not on file     Social Determinants of Health     Financial Resource Strain: Not on file   Food Insecurity: Not on file   Transportation Needs: Not on file   Physical Activity: Not on file   Stress: Not on file   Social Connections: Not on file   Intimate Partner Violence: Not on file   Housing Stability: Not on file         CURRENT MEDICATIONS  No current facility-administered medications on file prior to encounter.     Current Outpatient Medications on File Prior to Encounter   Medication Sig Dispense Refill    ferrous sulfate 325 (65 Fe) MG tablet Take 325 mg by mouth every day.      levonorgestrel-ethinyl estradiol (AVIANE) 0.1-20 MG-MCG per tablet Take 1 Tablet by mouth every day. 28 Tablet 11    Prenatal Vit-Fe Fumarate-FA  "(PRENATAL VITAMINS) 28-0.8 MG Tab Take 1 Tablet by mouth every day. 90 Tablet 3    sulfamethoxazole-trimethoprim (BACTRIM DS) 800-160 MG tablet Take 1 Tablet by mouth 2 times a day. (Patient not taking: Reported on 4/12/2023) 14 Tablet 0    ibuprofen (MOTRIN) 600 MG Tab Take 1 Tablet by mouth every 6 hours as needed for Moderate Pain. (Patient not taking: Reported on 4/12/2023) 30 Tablet 1    COLLAGEN PO Take 1 Tablet by mouth every day. (Patient not taking: Reported on 2/23/2024)      Prenatal Vit-Fe Fumarate-FA (PRENATAL PO) Take 1 Tablet by mouth every day. (Patient not taking: Reported on 2/23/2024)             ALLERGIES  No Known Allergies    PHYSICAL EXAM  VITAL SIGNS:BP (!) 150/70   Pulse 84   Temp 36.4 °C (97.6 °F) (Temporal)   Resp 18   Ht 1.575 m (5' 2\")   Wt 107 kg (235 lb 14.3 oz)   LMP 02/02/2024 (Exact Date)   SpO2 99%   BMI 43.15 kg/m²       VITALS - vital signs documented prior to this note have been reviewed and noted,  GENERAL - awake, alert, oriented, GCS 15, no apparent distress, non-toxic  appearing  HEENT - normocephalic, atraumatic, pupils equal, sclera anicteric, mucus  membranes moist  NECK - supple, no meningismus, full active range of motion, trachea midline  CARDIOVASCULAR - regular rate/rhythm, no murmurs/gallops/rubs  PULMONARY - no respiratory distress, speaking in full sentences, clear to  auscultation bilaterally, no wheezing/ronchi/rales, no accessory muscle use  GASTROINTESTINAL - soft, non-tender, non-distended, no rebound, guarding,  or peritonitis  GENITOURINARY - Deferred  NEUROLOGIC - Awake alert, normal mental status, speech fluid, cognition  normal, moves all extremities  MUSCULOSKELETAL - no obvious asymmetry or deformities present  EXTREMITIES - warm, well-perfused, no cyanosis or significant edema  DERMATOLOGIC - warm, dry, no rashes, no jaundice  PSYCHIATRIC - normal affect, normal insight, normal concentration    DIAGNOSTIC STUDIES / PROCEDURES  EKG: EKG shows a " normal sinus rhythm with a normal [place] QRS QTc interval there is a normal axis.  There is no ST elevation or depression to suggest ischemia no abnormal T wave inversion.  There is no STEMI pattern.  Interpretation is normal sinus rhythm as interpreted by myself  Rate is 67     Report   Date Value Ref Range Status   2024       [facility] Emergency Dept.    Test Date:  2024  Pt Name:    LTETY ALSTON         Department: ER  MRN:        [id number]                      Room:        18  Gender:     Female                       Technician: [id number]  :        [id number]                   Requested By:JACKIE ARAUJO  Order #:    [id number]                    Reading MD: Jackie Araujo    Measurements  Intervals                                Axis  Rate:       67                           P:          34  NY:         140                          QRS:        55  QRSD:       102                          T:          29  QT:         399  QTc:        422    Interpretive Statements  Sinus rhythm  RSR' in V1 or V2, right VCD or RVH  No previous ECG available for comparison  Electronically Signed On 2024 02:15:21 PDT by Jackie Araujo                LABS  Labs Reviewed   CBC WITH DIFFERENTIAL - Abnormal; Notable for the following components:       Result Value    WBC 15.9 (*)     Neutrophils (Absolute) 10.80 (*)     Monos (Absolute) 1.10 (*)     Immature Granulocytes (abs) 0.13 (*)     All other components within normal limits   COMP METABOLIC PANEL - Abnormal; Notable for the following components:    Potassium 3.5 (*)     Creatinine 0.46 (*)     All other components within normal limits   HCG QUANTITATIVE - Abnormal; Notable for the following components:    Bhcg 36564.0 (*)     All other components within normal limits   LIPASE   URINALYSIS,CULTURE IF INDICATED   RH TYPE FOR RHOGAM FROM E.D.   ESTIMATED GFR       Labs do show leukocytosis of the patient has no infectious complaints  and appears a similar when compared to prior CBCs.  hCG is appropriately uptrending Rh from last week was positive    RADIOLOGY  I have independently interpreted the diagnostic imaging associated with this visit and am waiting the final reading from the radiologist.   My preliminary interpretation is as follows:    Point of Care Ultrasound    ED POINT OF CARE ULTRASOUND: LIMITED TRANSABDOMINAL PELVIC/OB    Indication for Exam: Abdominal cramping in pregnancy    Intraunterine pregnancy Identified:Yes  Fetal Movement: Present  Fetal Heart Rate: 148  R. Adnexa: Unable to visualize  L. Adnexa: Unable to visualize  Recto-uterine pouch Free Fluid:not present      Image retained through [employer] as seen below:             Additional interpretation: Single live IUP    This study is a limited ultrasound examination performed and interpreted to evaluate for limited conditions as outlined above. There may be other clinically important information contained in the images that is outside this scope. When clinically warranted, a comprehensive ultrasound through the appropriate department is considered.       Radiologist interpretation:   No orders to display        COURSE & MEDICAL DECISION MAKING    ED COURSE:        INTERVENTIONS BY ME:  Medications   NS (Bolus) 0.9 % infusion 1,000 mL (1,000 mL Intravenous New Bag 3/31/24 0030)       Response on recheck: Improved    INITIAL ASSESSMENT, COURSE AND PLAN  Care Narrative: Patient presented for evaluation of lightheadedness and abdominal cramping in pregnancy.  Patient has had a intermittent abdominal cramping throughout this pregnancy no vaginal bleeding today.  Did have an episode of lightheadedness without associated syncopal episode earlier in the day.  Upon my assessment patient is resting comfortably in no acute distress, does have an elevated blood pressure reading, though this improved on recheck did recommend she follow-up with her OB gynecologist to ensure she does not  develop preeclampsia or hypertension during pregnancy.  Otherwise the patient has no headaches abdominal pain nausea vomiting or diarrhea.  Did feel better after IV fluids.  Did perform a bedside ultrasound which showed a single live IUP.  Does have an upcoming OB gynecology appointment.  Return precautions discussed discharge stable condition           ADDITIONAL PROBLEM LIST  Elevated blood pressure reading  DISPOSITION AND DISCUSSIONS      Escalation of care considered, and ultimately not performed:diagnostic imaging through formal ultrasound I am able to visualize a live IUP with normal heart rate on examination with what appears to be an improving subchorionic hemorrhage    Decision tools and prescription drugs considered including, but not limited to:  Discussed Tylenol for pain .    FINAL DIAGNOSIS  1. Dizziness    2. Abdominal cramping affecting pregnancy    3. Leukocytosis, unspecified type             Electronically signed by: Damion Araujo DO ,2:15 AM 03/31/24

## 2024-03-31 NOTE — ED TRIAGE NOTES
Chief Complaint   Patient presents with    Abdominal Pain     Lower quadrant pain described as severe cramping w/ lightheadedness. Reports pain is radiating down legs. Denies vaginal bleeding. Currently 2 months pregnant.      Vitals:    03/30/24 2333   BP: (!) 150/70   Pulse: 84   Resp: 18   Temp: 36.4 °C (97.6 °F)   SpO2: 99%     Pt ambulatory to triage w/ above complaint.   Pt was seen on 3/19 and told to come back if became lightheaded or had worsening cramping due to small subchorionic hemorrhage seen on US.   Placed pt back w/ phlebotomy, educated on NPO status, notified to alert staff of any changes/worsening symptoms.

## 2024-03-31 NOTE — ED NOTES
Break RN: Patient to room from lobby with steady gait. Changed into gown, connected to monitor. Agree with triage note. Charted for ERP. Call light within reach.

## 2024-04-08 ENCOUNTER — APPOINTMENT (OUTPATIENT)
Dept: RADIOLOGY | Facility: IMAGING CENTER | Age: 24
End: 2024-04-08
Payer: COMMERCIAL

## 2024-04-09 ENCOUNTER — INITIAL PRENATAL (OUTPATIENT)
Dept: OBGYN | Facility: CLINIC | Age: 24
End: 2024-04-09
Payer: COMMERCIAL

## 2024-04-09 ENCOUNTER — HOSPITAL ENCOUNTER (OUTPATIENT)
Facility: MEDICAL CENTER | Age: 24
End: 2024-04-09
Attending: ADVANCED PRACTICE MIDWIFE
Payer: COMMERCIAL

## 2024-04-09 ENCOUNTER — APPOINTMENT (OUTPATIENT)
Dept: OBGYN | Facility: CLINIC | Age: 24
End: 2024-04-09
Payer: COMMERCIAL

## 2024-04-09 VITALS — DIASTOLIC BLOOD PRESSURE: 70 MMHG | BODY MASS INDEX: 42.25 KG/M2 | WEIGHT: 231 LBS | SYSTOLIC BLOOD PRESSURE: 126 MMHG

## 2024-04-09 DIAGNOSIS — Z34.81 ENCOUNTER FOR SUPERVISION OF OTHER NORMAL PREGNANCY IN FIRST TRIMESTER: ICD-10-CM

## 2024-04-09 DIAGNOSIS — Z80.41 FAMILY HISTORY OF OVARIAN CANCER: ICD-10-CM

## 2024-04-09 LAB
ABO GROUP BLD: NORMAL
BLD GP AB SCN SERPL QL: NORMAL
ERYTHROCYTE [DISTWIDTH] IN BLOOD BY AUTOMATED COUNT: 41.9 FL (ref 35.9–50)
HBV SURFACE AG SER QL: ABNORMAL
HCT VFR BLD AUTO: 44.6 % (ref 37–47)
HCV AB SER QL: ABNORMAL
HGB BLD-MCNC: 14.9 G/DL (ref 12–16)
HIV 1+2 AB+HIV1 P24 AG SERPL QL IA: NORMAL
MCH RBC QN AUTO: 30.8 PG (ref 27–33)
MCHC RBC AUTO-ENTMCNC: 33.4 G/DL (ref 32.2–35.5)
MCV RBC AUTO: 92.3 FL (ref 81.4–97.8)
PLATELET # BLD AUTO: 311 K/UL (ref 164–446)
PMV BLD AUTO: 11.5 FL (ref 9–12.9)
RBC # BLD AUTO: 4.83 M/UL (ref 4.2–5.4)
RH BLD: NORMAL
RUBV AB SER QL: 160 IU/ML
T PALLIDUM AB SER QL IA: ABNORMAL
WBC # BLD AUTO: 15.5 K/UL (ref 4.8–10.8)

## 2024-04-09 PROCEDURE — 86900 BLOOD TYPING SEROLOGIC ABO: CPT

## 2024-04-09 PROCEDURE — 85027 COMPLETE CBC AUTOMATED: CPT

## 2024-04-09 PROCEDURE — 86803 HEPATITIS C AB TEST: CPT

## 2024-04-09 PROCEDURE — 87389 HIV-1 AG W/HIV-1&-2 AB AG IA: CPT

## 2024-04-09 PROCEDURE — 87077 CULTURE AEROBIC IDENTIFY: CPT

## 2024-04-09 PROCEDURE — 87591 N.GONORRHOEAE DNA AMP PROB: CPT

## 2024-04-09 PROCEDURE — 87086 URINE CULTURE/COLONY COUNT: CPT

## 2024-04-09 PROCEDURE — 0500F INITIAL PRENATAL CARE VISIT: CPT | Performed by: ADVANCED PRACTICE MIDWIFE

## 2024-04-09 PROCEDURE — 87480 CANDIDA DNA DIR PROBE: CPT

## 2024-04-09 PROCEDURE — 86780 TREPONEMA PALLIDUM: CPT

## 2024-04-09 PROCEDURE — 87660 TRICHOMONAS VAGIN DIR PROBE: CPT

## 2024-04-09 PROCEDURE — 3078F DIAST BP <80 MM HG: CPT | Performed by: ADVANCED PRACTICE MIDWIFE

## 2024-04-09 PROCEDURE — 3074F SYST BP LT 130 MM HG: CPT | Performed by: ADVANCED PRACTICE MIDWIFE

## 2024-04-09 PROCEDURE — 87510 GARDNER VAG DNA DIR PROBE: CPT

## 2024-04-09 PROCEDURE — 80307 DRUG TEST PRSMV CHEM ANLYZR: CPT

## 2024-04-09 PROCEDURE — 87491 CHLMYD TRACH DNA AMP PROBE: CPT

## 2024-04-09 PROCEDURE — 86850 RBC ANTIBODY SCREEN: CPT

## 2024-04-09 PROCEDURE — 36415 COLL VENOUS BLD VENIPUNCTURE: CPT

## 2024-04-09 PROCEDURE — 86901 BLOOD TYPING SEROLOGIC RH(D): CPT

## 2024-04-09 PROCEDURE — 86762 RUBELLA ANTIBODY: CPT

## 2024-04-09 PROCEDURE — 87340 HEPATITIS B SURFACE AG IA: CPT

## 2024-04-09 ASSESSMENT — FIBROSIS 4 INDEX: FIB4 SCORE: 0.25

## 2024-04-09 NOTE — PROGRESS NOTES
Pt. Here for NOB visit today.  # [phone]  First prenatal care  Pt. States no complaints   Pharmacy verified  Pt would like AFP   Chaperone offered and not indicated  Last PAP: 4/12/2023, [facility]

## 2024-04-10 LAB
C TRACH DNA GENITAL QL NAA+PROBE: NEGATIVE
CANDIDA DNA VAG QL PROBE+SIG AMP: NEGATIVE
G VAGINALIS DNA VAG QL PROBE+SIG AMP: POSITIVE
N GONORRHOEA DNA GENITAL QL NAA+PROBE: POSITIVE
SPECIMEN SOURCE: ABNORMAL
T VAGINALIS DNA VAG QL PROBE+SIG AMP: NEGATIVE

## 2024-04-11 ENCOUNTER — TELEPHONE (OUTPATIENT)
Dept: OBGYN | Facility: CLINIC | Age: 24
End: 2024-04-11
Payer: COMMERCIAL

## 2024-04-11 PROBLEM — O98.211 GONORRHEA AFFECTING PREGNANCY IN FIRST TRIMESTER: Status: ACTIVE | Noted: 2024-04-11

## 2024-04-11 PROBLEM — R82.71 GROUP B STREPTOCOCCAL BACTERIURIA: Status: ACTIVE | Noted: 2024-04-11

## 2024-04-11 LAB
AMPHET CTO UR CFM-MCNC: NEGATIVE NG/ML
BACTERIA UR CULT: ABNORMAL
BACTERIA UR CULT: ABNORMAL
BARBITURATES CTO UR CFM-MCNC: NEGATIVE NG/ML
BENZODIAZ CTO UR CFM-MCNC: NEGATIVE NG/ML
CANNABINOIDS CTO UR CFM-MCNC: NEGATIVE NG/ML
COCAINE CTO UR CFM-MCNC: NEGATIVE NG/ML
CREAT UR-MCNC: 171.3 MG/DL (ref 20–400)
DRUG COMMENT 753798: NORMAL
METHADONE CTO UR CFM-MCNC: NEGATIVE NG/ML
OPIATES CTO UR CFM-MCNC: NEGATIVE NG/ML
PCP CTO UR CFM-MCNC: NEGATIVE NG/ML
PROPOXYPH CTO UR CFM-MCNC: NEGATIVE NG/ML
SIGNIFICANT IND 70042: ABNORMAL
SITE SITE: ABNORMAL
SOURCE SOURCE: ABNORMAL

## 2024-04-11 RX ORDER — METRONIDAZOLE 500 MG/1
500 TABLET ORAL 2 TIMES DAILY
Qty: 14 TABLET | Refills: 0 | Status: SHIPPED | OUTPATIENT
Start: 2024-04-11

## 2024-04-11 NOTE — TELEPHONE ENCOUNTER
----- Message from JUAN Bruce sent at 4/11/2024  1:14 PM PDT -----  Noted; will treat BV and gonorrhea.     4/11/2024 1449  Left message for pt to call back regarding lab results.   [facility] form and results sent to [facility]. See media.    4/12/2024 0924  Left message for pt to call back regarding lab results.     4/15/2024 1057  Called pt and informed her test came back positive for BV, explained this is not an STI. Pt informed she needs to start antibiotics. Should take the full Tx and advised to take meds with food but not with milk and to avoid alcohol and intercourse while on Tx. Pharmacy verified with pt. Pt agreed and verbalized understanding.   Informed pt of Gonorrhea diagnosis and need for tx in office with Rocephin injection. Scheduled for 4/16/2024 at 1500. Informed pt that any current partner or partners in the last 3 months need to be notified and tx'd by the [facility]. Pt needs to abstain from sexual intercourse for a week after she and her current partner are tx'd. Pt stated that she had been tested 1.5 months ago and it was negative. Wanted to know if she could have been infected previously, but it takes a while to be positive on a text. Consulted with Sanam Gutierrez CNM who stated that the test 1.5 months ago could have been a false negative, but most likely she was infected in the last 1.5 months. Informed pt. Pt asked if a positive test would show up immediately. Pt stated that she would ask at her next visit. Pt verbalized understanding.  Consulted with Pilar delarosa: incubation period. She stated that it can take a week to show up positive in testing. Will send pt a [employer] message with information.

## 2024-04-15 ASSESSMENT — EDINBURGH POSTNATAL DEPRESSION SCALE (EPDS)
TOTAL SCORE: 9
I HAVE BEEN SO UNHAPPY THAT I HAVE BEEN CRYING: ONLY OCCASIONALLY
I HAVE BEEN ABLE TO LAUGH AND SEE THE FUNNY SIDE OF THINGS: AS MUCH AS I ALWAYS COULD
THINGS HAVE BEEN GETTING ON TOP OF ME: YES, SOMETIMES I HAVEN'T BEEN COPING AS WELL AS USUAL
THE THOUGHT OF HARMING MYSELF HAS OCCURRED TO ME: HARDLY EVER
I HAVE BEEN SO UNHAPPY THAT I HAVE HAD DIFFICULTY SLEEPING: NOT AT ALL
I HAVE LOOKED FORWARD WITH ENJOYMENT TO THINGS: AS MUCH AS I EVER DID
I HAVE FELT SAD OR MISERABLE: NOT VERY OFTEN
I HAVE FELT SCARED OR PANICKY FOR NO GOOD REASON: NO, NOT MUCH
I HAVE BEEN ANXIOUS OR WORRIED FOR NO GOOD REASON: YES, SOMETIMES
I HAVE BLAMED MYSELF UNNECESSARILY WHEN THINGS WENT WRONG: NOT VERY OFTEN

## 2024-04-16 ENCOUNTER — NON-PROVIDER VISIT (OUTPATIENT)
Dept: OBGYN | Facility: CLINIC | Age: 24
End: 2024-04-16
Payer: COMMERCIAL

## 2024-04-16 DIAGNOSIS — O98.211 GONORRHEA AFFECTING PREGNANCY IN FIRST TRIMESTER: ICD-10-CM

## 2024-04-16 PROCEDURE — 96372 THER/PROPH/DIAG INJ SC/IM: CPT | Performed by: ADVANCED PRACTICE MIDWIFE

## 2024-04-16 NOTE — PROGRESS NOTES
Pt here for Rocephin for +Gonorrhea. Pt states she has not started meds for BV but will later today. Pt states her partner went to urgent care on 4/15/2024 and received rocephin injection but was not actually tested, Pt instructed to abstain from sex for 7 days after treatment. Pt agreed and had no further questions or concerns.

## 2024-04-26 ENCOUNTER — TELEPHONE (OUTPATIENT)
Dept: OBGYN | Facility: CLINIC | Age: 24
End: 2024-04-26
Payer: COMMERCIAL

## 2024-05-04 ENCOUNTER — HOSPITAL ENCOUNTER (EMERGENCY)
Facility: MEDICAL CENTER | Age: 24
End: 2024-05-04
Attending: EMERGENCY MEDICINE
Payer: COMMERCIAL

## 2024-05-04 ENCOUNTER — APPOINTMENT (OUTPATIENT)
Dept: RADIOLOGY | Facility: MEDICAL CENTER | Age: 24
End: 2024-05-04
Attending: EMERGENCY MEDICINE
Payer: COMMERCIAL

## 2024-05-04 VITALS
RESPIRATION RATE: 18 BRPM | SYSTOLIC BLOOD PRESSURE: 136 MMHG | DIASTOLIC BLOOD PRESSURE: 62 MMHG | OXYGEN SATURATION: 98 % | BODY MASS INDEX: 41.56 KG/M2 | HEIGHT: 63 IN | WEIGHT: 234.57 LBS | TEMPERATURE: 97.2 F | HEART RATE: 79 BPM

## 2024-05-04 DIAGNOSIS — O46.8X2 SUBCHORIONIC HEMATOMA IN SECOND TRIMESTER, SINGLE OR UNSPECIFIED FETUS: ICD-10-CM

## 2024-05-04 DIAGNOSIS — O41.8X20 SUBCHORIONIC HEMATOMA IN SECOND TRIMESTER, SINGLE OR UNSPECIFIED FETUS: ICD-10-CM

## 2024-05-04 DIAGNOSIS — O46.90 VAGINAL BLEEDING IN PREGNANCY: ICD-10-CM

## 2024-05-04 LAB
ALBUMIN SERPL BCP-MCNC: 3.9 G/DL (ref 3.2–4.9)
ALBUMIN/GLOB SERPL: 1.3 G/DL
ALP SERPL-CCNC: 62 U/L (ref 30–99)
ALT SERPL-CCNC: 11 U/L (ref 2–50)
ANION GAP SERPL CALC-SCNC: 12 MMOL/L (ref 7–16)
APPEARANCE UR: CLEAR
AST SERPL-CCNC: 13 U/L (ref 12–45)
B-HCG SERPL-ACNC: ABNORMAL MIU/ML (ref 0–5)
BASOPHILS # BLD AUTO: 0.5 % (ref 0–1.8)
BASOPHILS # BLD: 0.08 K/UL (ref 0–0.12)
BILIRUB SERPL-MCNC: 0.2 MG/DL (ref 0.1–1.5)
BILIRUB UR QL STRIP.AUTO: NEGATIVE
BUN SERPL-MCNC: 7 MG/DL (ref 8–22)
CALCIUM ALBUM COR SERPL-MCNC: 9.6 MG/DL (ref 8.5–10.5)
CALCIUM SERPL-MCNC: 9.5 MG/DL (ref 8.5–10.5)
CHLORIDE SERPL-SCNC: 105 MMOL/L (ref 96–112)
CO2 SERPL-SCNC: 21 MMOL/L (ref 20–33)
COLOR UR: YELLOW
CREAT SERPL-MCNC: 0.4 MG/DL (ref 0.5–1.4)
EOSINOPHIL # BLD AUTO: 0.31 K/UL (ref 0–0.51)
EOSINOPHIL NFR BLD: 2 % (ref 0–6.9)
ERYTHROCYTE [DISTWIDTH] IN BLOOD BY AUTOMATED COUNT: 39.9 FL (ref 35.9–50)
GFR SERPLBLD CREATININE-BSD FMLA CKD-EPI: 141 ML/MIN/1.73 M 2
GLOBULIN SER CALC-MCNC: 2.9 G/DL (ref 1.9–3.5)
GLUCOSE SERPL-MCNC: 102 MG/DL (ref 65–99)
GLUCOSE UR STRIP.AUTO-MCNC: NEGATIVE MG/DL
HCT VFR BLD AUTO: 39.7 % (ref 37–47)
HGB BLD-MCNC: 13.9 G/DL (ref 12–16)
IMM GRANULOCYTES # BLD AUTO: 0.22 K/UL (ref 0–0.11)
IMM GRANULOCYTES NFR BLD AUTO: 1.4 % (ref 0–0.9)
KETONES UR STRIP.AUTO-MCNC: NEGATIVE MG/DL
LEUKOCYTE ESTERASE UR QL STRIP.AUTO: NEGATIVE
LIPASE SERPL-CCNC: 17 U/L (ref 11–82)
LYMPHOCYTES # BLD AUTO: 2.48 K/UL (ref 1–4.8)
LYMPHOCYTES NFR BLD: 16 % (ref 22–41)
MCH RBC QN AUTO: 31.2 PG (ref 27–33)
MCHC RBC AUTO-ENTMCNC: 35 G/DL (ref 32.2–35.5)
MCV RBC AUTO: 89 FL (ref 81.4–97.8)
MICRO URNS: NORMAL
MONOCYTES # BLD AUTO: 1.05 K/UL (ref 0–0.85)
MONOCYTES NFR BLD AUTO: 6.8 % (ref 0–13.4)
NEUTROPHILS # BLD AUTO: 11.36 K/UL (ref 1.82–7.42)
NEUTROPHILS NFR BLD: 73.3 % (ref 44–72)
NITRITE UR QL STRIP.AUTO: NEGATIVE
NRBC # BLD AUTO: 0 K/UL
NRBC BLD-RTO: 0 /100 WBC (ref 0–0.2)
NUMBER OF RH DOSES IND 8505RD: NORMAL
PH UR STRIP.AUTO: 6 [PH] (ref 5–8)
PLATELET # BLD AUTO: 283 K/UL (ref 164–446)
PMV BLD AUTO: 10.8 FL (ref 9–12.9)
POTASSIUM SERPL-SCNC: 3.7 MMOL/L (ref 3.6–5.5)
PROT SERPL-MCNC: 6.8 G/DL (ref 6–8.2)
PROT UR QL STRIP: NEGATIVE MG/DL
RBC # BLD AUTO: 4.46 M/UL (ref 4.2–5.4)
RBC UR QL AUTO: NEGATIVE
RH BLD: NORMAL
SODIUM SERPL-SCNC: 138 MMOL/L (ref 135–145)
SP GR UR STRIP.AUTO: 1.01
UROBILINOGEN UR STRIP.AUTO-MCNC: 0.2 MG/DL
WBC # BLD AUTO: 15.5 K/UL (ref 4.8–10.8)

## 2024-05-04 ASSESSMENT — FIBROSIS 4 INDEX: FIB4 SCORE: 0.25

## 2024-05-05 LAB
C TRACH DNA SPEC QL NAA+PROBE: NEGATIVE
N GONORRHOEA DNA SPEC QL NAA+PROBE: NEGATIVE
SPECIMEN SOURCE: NORMAL

## 2024-05-05 NOTE — ED PROVIDER NOTES
ER Provider Note    Gregory Davis M.D.. 5/4/2024  8:26 PM    Primary Care Provider: Janessa Reveles M.D.    CHIEF COMPLAINT  Chief Complaint   Patient presents with    Abdominal Pain     13 weeks pregnant   Lower abdominal and back pain   Vaginal bleeding       EXTERNAL RECORDS REVIEWED  Records show 2 previous emergency department visits, both in March, for concerns related to this pregnancy.  She has a preadmitted with Dr. Armani Thakkar in November of this year for delivery.  She has a history of ectopic pregnancy.  In March, she was seen for 2 separate visits with abdominal cramping.  There was a small subchorionic hemorrhage noted on 19 March.      HPI/ROS  LIMITATION TO HISTORY       OUTSIDE HISTORIAN(S):      Chen Gupta is a 23 y.o. female who presents to the ED complaining of crampy lower abdominal pain radiating to the back, with some vaginal bleeding earlier today, which has resolved.  This is in the setting of pregnancy, with a previously noted small subchorionic hemorrhage, as above.  No nausea or vomiting.  No dysuria.  The patient does note that she was diagnosed with and treated for gonorrhea, but still has been having some discharge.  Her partner was also treated.  She has an appointment this coming week with her OB/GYN, but is interested in a test of cure.  She has not had it fevers.    PAST MEDICAL HISTORY  Past Medical History:   Diagnosis Date    GERD (gastroesophageal reflux disease)        SURGICAL HISTORY  Past Surgical History:   Procedure Laterality Date    IN LAP,DIAGNOSTIC ABDOMEN Left 1/31/2023    Procedure: PELVISCOPY FOR RUPTURED ECTOPIC;  Surgeon: Martha Jacobo D.O.;  Location: SURGERY [facility];  Service: Obstetrics    SALPINGECTOMY Left 1/31/2023    Procedure: SALPINGECTOMY;  Surgeon: Martha Jacobo D.O.;  Location: SURGERY [facility];  Service: Obstetrics       FAMILY HISTORY  Family History   Problem Relation Age of Onset    Ovarian Cancer Mother      "Heart Disease Father     Diabetes Sister     Diabetes Maternal Grandmother     No Known Problems Paternal Grandmother     No Known Problems Paternal Grandfather        SOCIAL HISTORY   reports that she has never smoked. She has never used smokeless tobacco. She reports that she does not currently use alcohol. She reports that she does not use drugs.    CURRENT MEDICATIONS  Discharge Medication List as of 5/4/2024 10:16 PM        CONTINUE these medications which have NOT CHANGED    Details   metroNIDAZOLE (FLAGYL) 500 MG Tab Take 1 Tablet by mouth 2 times a day., Disp-14 Tablet, R-0, Normal      !! Prenatal Vit-Fe Fumarate-FA (PRENATAL VITAMINS) 28-0.8 MG Tab Take 1 Tablet by mouth every day., Disp-90 Tablet, R-3, Normal      !! Prenatal Vit-Fe Fumarate-FA (PRENATAL PO) Take 1 Tablet by mouth every day., Historical Med       !! - Potential duplicate medications found. Please discuss with provider.          ALLERGIES  Patient has no known allergies.    PHYSICAL EXAM  VITAL SIGNS: /62   Pulse 79   Temp 36.2 °C (97.2 °F) (Temporal)   Resp 18   Ht 1.6 m (5' 3\")   Wt 106 kg (234 lb 9.1 oz)   LMP 02/02/2024 (Exact Date)   SpO2 98%   BMI 41.55 kg/m²   Pulse ox interpretation: I interpret this pulse ox as normal.  Constitutional: Alert in no apparent distress.  HENT: No signs of trauma, Bilateral external ears normal, Nose normal.   Eyes: Conjunctiva normal, Non-icteric.   Neck: Normal range of motion, Supple, No stridor.   Lymphatic: No lymphadenopathy noted.   Cardiovascular: Regular rate and rhythm, no murmurs.   Thorax & Lungs: Normal breath sounds, No respiratory distress, No wheezing  Abdomen: Bowel sounds normal, Soft, mild suprapubic tenderness, No masses, No pulsatile masses. No peritoneal signs.  Skin: Warm, Dry, No erythema, No rash.   Extremities: Intact distal pulses, No edema, No cyanosis.  Musculoskeletal: Good range of motion in all major joints. No or major deformities noted.   Neurologic: " Alert , Normal motor function, Normal sensory function, No focal deficits noted.   Psychiatric: Affect normal, Judgment normal, Mood normal.     DIAGNOSTIC STUDIES    Labs:   Labs Reviewed   CBC WITH DIFFERENTIAL - Abnormal; Notable for the following components:       Result Value    WBC 15.5 (*)     Neutrophils-Polys 73.30 (*)     Lymphocytes 16.00 (*)     Immature Granulocytes 1.40 (*)     Neutrophils (Absolute) 11.36 (*)     Monos (Absolute) 1.05 (*)     Immature Granulocytes (abs) 0.22 (*)     All other components within normal limits   COMP METABOLIC PANEL - Abnormal; Notable for the following components:    Glucose 102 (*)     Bun 7 (*)     Creatinine 0.40 (*)     All other components within normal limits   HCG QUANTITATIVE - Abnormal; Notable for the following components:    Bhcg 81894.0 (*)     All other components within normal limits   LIPASE   URINALYSIS,CULTURE IF INDICATED   RH TYPE FOR RHOGAM FROM E.D.   CHLAMYDIA/GC, PCR (URINE)   ESTIMATED GFR       EKG:   I have independently interpreted this EKG  Results for orders placed or performed during the hospital encounter of 24   EKG   Result Value Ref Range    Report       [facility] Emergency Dept.    Test Date:  2024  Pt Name:    LETTY ALSTON         Department: ER  MRN:        [id number]                      Room:        18  Gender:     Female                       Technician: [id number]  :        2000                   Requested By:JACKIE RAAUJO  Order #:    [id number]                    Reading MD: Jackie Araujo    Measurements  Intervals                                Axis  Rate:       67                           P:          34  SD:         140                          QRS:        55  QRSD:       102                          T:          29  QT:         399  QTc:        422    Interpretive Statements  Sinus rhythm  RSR' in V1 or V2, right VCD or RVH  No previous ECG available for  comparison  Electronically Signed On 03- 02:15:21 PDT by Damion Araujo           Radiology:   The attending emergency physician has independently interpreted the diagnostic imaging associated with this visit and am waiting the final reading from the radiologist.   Preliminary interpretation is a follows: Single IUP  Radiologist interpretation:   US-OB 1ST TRIMESTER WITH TRANSVAGINAL (COMBO)   Final Result      1.  Single living intrauterine pregnancy at 14 weeks, 5 days estimated gestational age.   2.  Small placental lake or small subchorionic hemorrhage lower uterine segment/cervix region.            COURSE & MEDICAL DECISION MAKING     INITIAL ASSESSMENT, COURSE AND PLAN  Care Narrative:       8:26 PM Patient presents to the ED with vaginal bleeding in pregnancy, which started this morning, and resolved by the time she arrived in the emergency department.  She has a documented prior subchorionic hemorrhage during this pregnancy.  She is also concerned that she has not yet had a test of cure after being treated for gonorrhea. Per triage protocol, pregnancy related labs, and I have added a pelvic ultrasound due to the bleeding, as well as a GC from her urine test was ordered. Patient evaluated at bedside and discussed plan of care, including the above.  Differential diagnoses include but not limited to: Subchorionic hemorrhage, threatened miscarriage, vaginal bleeding in pregnancy, gonorrhea or chlamydia.  She has a follow-up OB/GYN appointment on Wednesday, and has already been treated by that provider during this pregnant for gonorrhea and BV.  Her partner has also been appropriately treated.  She does not have systemic symptoms, she does not have significant pain, I think PID and TOA are unlikely at this point.    10:10 PM ultrasound results reviewed with the patient.  We discussed her healthy appearing single live IUP.  I drew a picture of a subchorionic hemorrhage, which she may not of clearly  understood before, though does remember that it was previously documented.  She feels reassured.  We discussed that her GC will results within the next day, and she is happy to check results on [place].  She has an OB/GYN appointment within the next 4 days, and will follow those results.  Her labs show a nonspecific leukocytosis, but she has no vital sign abnormalities or systemic symptoms.  I think this is nonspecific, and she appears well, and has an appropriate follow-up appointment.  She understands she can return here for any worsening symptoms in the meantime.    ADDITIONAL PROBLEM MANAGED  Recent positive tests for gonorrhea and BV complicate this visit.    DISPOSITION AND DISCUSSIONS    Escalation of care considered, and ultimately not performed: acute inpatient care management, however at this time, the patient is most appropriate for outpatient management.  She has a nonspecific leukocytosis, no systemic symptoms, normal vital signs, closely scheduled follow-up visit.    Barriers to care at this time, including but not limited to: Patient does not have established PCP but does have an established OB/GYN for this pregnancy with an appointment in the next 4 days.    Decision tools and prescription drugs considered including, but not limited to: Medication modification were considered, but not indicated based on today's evaluation, though her GC test is still pending, she understands .     The patient will return for new or worsening symptoms and is stable at the time of discharge.    The patient is referred to a primary physician for blood pressure management, diabetic screening, and for all other preventative health concerns.    DISPOSITION:  Patient will be discharged home in stable condition.    FOLLOW UP:  Your OBGYN appointment on Wednesday          [facility], Emergency Dept  [address] [address] [phone]    If symptoms worsen      OUTPATIENT  MEDICATIONS:  Discharge Medication List as of 5/4/2024 10:16 PM            FINAL DIAGNOSIS  1. Vaginal bleeding in pregnancy    2. Subchorionic hematoma in second trimester, single or unspecified fetus            Gregory SCHAFER M.D. (Scribe), am scribing for, and in the presence of, Gregory Davis M.D..    Electronically signed by: Gregory Davis M.D. (Scribe), 5/4/2024    Gregory SCHAFER M.D. personally performed the services described in this documentation, as scribed by Gregory Davis M.D. in my presence, and it is both accurate and complete.

## 2024-05-05 NOTE — ED NOTES
Pt resting comfortably in bed with even and unlabored respirations, on monitor, call light within reach. Family at bedside.

## 2024-05-05 NOTE — DISCHARGE INSTRUCTIONS
Your tests were reassuring.  We will have your gonorrhea/chlamydia test results tomorrow.  You can check them via [facility].  If you need retreatment, this can be performed by your OB/GYN at your appointment on Wednesday.  Return to the emergency department for worsening or severe symptoms between now and that appointment.

## 2024-05-05 NOTE — ED TRIAGE NOTES
"Chief Complaint   Patient presents with    Abdominal Pain     13 weeks pregnant   Lower abdominal and back pain   Vaginal bleeding       Pt presented to ED for the above mentioned complaints. Pt stated she started having lower abdominal cramping and lower back pain. Pt also reported having heavy bleeding started in the morning and now it's just spotting. Pt is 13 weeks pregnant.     BP (!) 152/59   Pulse 81   Temp 35.9 °C (96.7 °F) (Temporal)   Resp 18   Ht 1.6 m (5' 3\")   Wt 106 kg (234 lb 9.1 oz)   LMP 02/02/2024 (Exact Date)   SpO2 98%   BMI 41.55 kg/m²     "

## 2024-05-08 ENCOUNTER — HOSPITAL ENCOUNTER (OUTPATIENT)
Facility: MEDICAL CENTER | Age: 24
End: 2024-05-08
Attending: ADVANCED PRACTICE MIDWIFE
Payer: COMMERCIAL

## 2024-05-08 ENCOUNTER — ROUTINE PRENATAL (OUTPATIENT)
Dept: OBGYN | Facility: CLINIC | Age: 24
End: 2024-05-08
Payer: COMMERCIAL

## 2024-05-08 VITALS — BODY MASS INDEX: 41.42 KG/M2 | SYSTOLIC BLOOD PRESSURE: 126 MMHG | WEIGHT: 233.8 LBS | DIASTOLIC BLOOD PRESSURE: 58 MMHG

## 2024-05-08 DIAGNOSIS — O98.212 GONORRHEA AFFECTING PREGNANCY IN SECOND TRIMESTER: ICD-10-CM

## 2024-05-08 DIAGNOSIS — Z34.82 ENCOUNTER FOR SUPERVISION OF OTHER NORMAL PREGNANCY IN SECOND TRIMESTER: ICD-10-CM

## 2024-05-08 PROCEDURE — 3074F SYST BP LT 130 MM HG: CPT | Performed by: ADVANCED PRACTICE MIDWIFE

## 2024-05-08 PROCEDURE — 3078F DIAST BP <80 MM HG: CPT | Performed by: ADVANCED PRACTICE MIDWIFE

## 2024-05-08 PROCEDURE — 0502F SUBSEQUENT PRENATAL CARE: CPT | Performed by: ADVANCED PRACTICE MIDWIFE

## 2024-05-08 ASSESSMENT — FIBROSIS 4 INDEX: FIB4 SCORE: 0.33

## 2024-05-08 NOTE — PROGRESS NOTES
Pt. Here for OB/FU.   Reports Good FM.   Pt. Denies LOF, or UC's.   Pt states she's having brown discharge with no odor    Phone/Pharm verified.  [phone]

## 2024-05-08 NOTE — PROGRESS NOTES
Pt is a 23 y.o.   at 13w5d  gestation here for JAGDISH visit. She reports absent fetal movement. Does report some vaginal bleeding and cramping/regular contractions. She reports overall today she is feeling well. One recent ER visits since last seen. Patient seen for bleeding. Found to have normal exam. Patient also reporting that fetus was measuring bigger than current due date and is male based on ED visit. Discussed biometry with patient and that her dating will remain based on 6 week ultrasound. We also discussed SHRADDHA today as both her and partner have been treated.  Adequate hydration reviewed in detail with patient. Precautions and warning signs reviewed with patient.  RTC in 4 week(s) for JAGDISH visit.

## 2024-05-09 ENCOUNTER — APPOINTMENT (OUTPATIENT)
Dept: RADIOLOGY | Facility: MEDICAL CENTER | Age: 24
End: 2024-05-09
Attending: NURSE PRACTITIONER
Payer: COMMERCIAL

## 2024-05-16 ENCOUNTER — APPOINTMENT (OUTPATIENT)
Dept: RADIOLOGY | Facility: MEDICAL CENTER | Age: 24
End: 2024-05-16
Attending: NURSE PRACTITIONER
Payer: COMMERCIAL

## 2024-05-31 ENCOUNTER — HOSPITAL ENCOUNTER (OUTPATIENT)
Dept: LAB | Facility: MEDICAL CENTER | Age: 24
End: 2024-05-31
Attending: ADVANCED PRACTICE MIDWIFE
Payer: COMMERCIAL

## 2024-05-31 DIAGNOSIS — Z34.82 ENCOUNTER FOR SUPERVISION OF OTHER NORMAL PREGNANCY IN SECOND TRIMESTER: ICD-10-CM

## 2024-05-31 PROCEDURE — 81511 FTL CGEN ABNOR FOUR ANAL: CPT

## 2024-05-31 PROCEDURE — 36415 COLL VENOUS BLD VENIPUNCTURE: CPT

## 2024-06-05 ENCOUNTER — ROUTINE PRENATAL (OUTPATIENT)
Dept: OBGYN | Facility: CLINIC | Age: 24
End: 2024-06-05
Payer: COMMERCIAL

## 2024-06-05 VITALS — WEIGHT: 239 LBS | BODY MASS INDEX: 42.34 KG/M2 | SYSTOLIC BLOOD PRESSURE: 120 MMHG | DIASTOLIC BLOOD PRESSURE: 60 MMHG

## 2024-06-05 DIAGNOSIS — Z34.80 SUPERVISION OF OTHER NORMAL PREGNANCY, ANTEPARTUM: ICD-10-CM

## 2024-06-05 LAB
# FETUSES US: NORMAL
AFP MOM SERPL: 1.28
AFP SERPL-MCNC: 37 NG/ML
AGE - REPORTED: 25.1 YR
CURRENT SMOKER: NO
FAMILY MEMBER DISEASES HX: NO
GA METHOD: NORMAL
GA: NORMAL WK
HCG MOM SERPL: 1.95
HCG SERPL-ACNC: NORMAL IU/L
HX OF HEREDITARY DISORDERS: NO
IDDM PATIENT QL: NO
INHIBIN A MOM SERPL: 1.77
INHIBIN A SERPL-MCNC: 222 PG/ML
INTEGRATED SCN PATIENT-IMP: NORMAL
PATHOLOGY STUDY: NORMAL
SPECIMEN DRAWN SERPL: NORMAL
U ESTRIOL MOM SERPL: 1.72
U ESTRIOL SERPL-MCNC: 2.5 NG/ML

## 2024-06-05 PROCEDURE — 3074F SYST BP LT 130 MM HG: CPT | Performed by: MIDWIFE

## 2024-06-05 PROCEDURE — 3078F DIAST BP <80 MM HG: CPT | Performed by: MIDWIFE

## 2024-06-05 PROCEDURE — 0502F SUBSEQUENT PRENATAL CARE: CPT | Performed by: MIDWIFE

## 2024-06-05 ASSESSMENT — FIBROSIS 4 INDEX: FIB4 SCORE: 0.33

## 2024-06-05 NOTE — PROGRESS NOTES
S: Pt is a 23 y.o.  at 17w5d gestation here today for routine prenatal care.     Concerns today include:  Occasional Headaches and dizziness. Hydrating but unsure exact amount. Not taking in electrolytes. Also reports occasional numbness in right arm and left foot. Usually occurs when she is standing for long periods of time. Denies muscle weakness and facial numbness.  CBC and CMP normal 5/4. Denies hx migraines with neuro involvement.    Denies: vaginal bleeding, pelvic and abdominal pain, cramping, contractions, leaking of fluid, urinary and vaginal symptoms    Pt reports fetal movement as Present     O: /60   Wt 239 lb   LMP 2024 (Exact Date)   BMI 42.34 kg/m²    *see prenatal flowsheet*     Labs:     Prenatal labs: Completed and normal  GCT: not yet collected   GBS: GBS bacteriuria. Discuss antibiotic prophylaxis in labor.  Genetic testing: AFP tetra neg  STI testing: negative    Ultrasound: none since last visit    A/P:     Problem List Items Addressed This Visit          Women's Health Problems    Supervision of other normal pregnancy, antepartum     Pt is a 23 y.o.  at 17w5d gestation here today for routine prenatal care.   Size equal to dates    Discuss 2nd trimester warning signs  Address concerns: Discuss prevention of headaches and dizziness in pregnancy with increased hydration, electrolytes and magnesium and Vit B supplementation. Discuss warning signs related to limb numbness. CBC and CMP 5/4 was normal. Will continue to monitor and notify provider if worsens.   Anatomy scan scheduled.   RTC 4 wks

## 2024-06-05 NOTE — PROGRESS NOTES
Pt here today for OB follow up  Pt states she is experiencing headaches and dizzy ness for a month  Reports +FM  Good # [phone] (home)    Pharmacy Confirmed.  Chaperone offered and declined.    AFP done

## 2024-06-05 NOTE — ASSESSMENT & PLAN NOTE
Pt is a 23 y.o.  at 17w5d gestation here today for routine prenatal care.   Size equal to dates    Discuss 2nd trimester warning signs  Address concerns: Discuss prevention of headaches and dizziness in pregnancy with increased hydration, electrolytes and magnesium and Vit B supplementation. Discuss warning signs related to limb numbness. CBC and CMP 5/4 was normal. Will continue to monitor and notify provider if worsens.   Anatomy scan scheduled.   RTC 4 wks

## 2024-06-21 ENCOUNTER — APPOINTMENT (OUTPATIENT)
Dept: RADIOLOGY | Facility: IMAGING CENTER | Age: 24
End: 2024-06-21
Payer: COMMERCIAL

## 2024-06-21 DIAGNOSIS — Z34.81 ENCOUNTER FOR SUPERVISION OF OTHER NORMAL PREGNANCY, FIRST TRIMESTER: ICD-10-CM

## 2024-06-21 PROCEDURE — 76805 OB US >/= 14 WKS SNGL FETUS: CPT | Mod: TC | Performed by: ADVANCED PRACTICE MIDWIFE

## 2024-07-02 ENCOUNTER — TELEPHONE (OUTPATIENT)
Dept: OBGYN | Facility: CLINIC | Age: 24
End: 2024-07-02
Payer: COMMERCIAL

## 2024-07-02 ENCOUNTER — DATING (OUTPATIENT)
Dept: OBGYN | Facility: CLINIC | Age: 24
End: 2024-07-02
Payer: COMMERCIAL

## 2024-07-02 DIAGNOSIS — O26.842 UTERINE SIZE DATE DISCREPANCY PREGNANCY, SECOND TRIMESTER: ICD-10-CM

## 2024-07-05 ENCOUNTER — HOSPITAL ENCOUNTER (OUTPATIENT)
Dept: LAB | Facility: MEDICAL CENTER | Age: 24
End: 2024-07-05
Attending: ADVANCED PRACTICE MIDWIFE
Payer: COMMERCIAL

## 2024-07-05 DIAGNOSIS — O26.842 UTERINE SIZE DATE DISCREPANCY PREGNANCY, SECOND TRIMESTER: ICD-10-CM

## 2024-07-05 LAB
EST. AVERAGE GLUCOSE BLD GHB EST-MCNC: 100 MG/DL
GLUCOSE 1H P 50 G GLC PO SERPL-MCNC: 105 MG/DL (ref 70–139)
HBA1C MFR BLD: 5.1 % (ref 4–5.6)

## 2024-07-05 PROCEDURE — 83036 HEMOGLOBIN GLYCOSYLATED A1C: CPT

## 2024-07-05 PROCEDURE — 36415 COLL VENOUS BLD VENIPUNCTURE: CPT

## 2024-07-05 PROCEDURE — 82950 GLUCOSE TEST: CPT

## 2024-07-08 ENCOUNTER — ROUTINE PRENATAL (OUTPATIENT)
Dept: OBGYN | Facility: CLINIC | Age: 24
End: 2024-07-08
Payer: COMMERCIAL

## 2024-07-08 VITALS — WEIGHT: 245 LBS | BODY MASS INDEX: 43.4 KG/M2 | SYSTOLIC BLOOD PRESSURE: 116 MMHG | DIASTOLIC BLOOD PRESSURE: 60 MMHG

## 2024-07-08 DIAGNOSIS — Z34.82 PRENATAL CARE, SUBSEQUENT PREGNANCY, SECOND TRIMESTER: ICD-10-CM

## 2024-07-08 PROCEDURE — 3074F SYST BP LT 130 MM HG: CPT

## 2024-07-08 PROCEDURE — 0502F SUBSEQUENT PRENATAL CARE: CPT

## 2024-07-08 PROCEDURE — 3078F DIAST BP <80 MM HG: CPT

## 2024-07-08 ASSESSMENT — FIBROSIS 4 INDEX: FIB4 SCORE: 0.33

## 2024-07-17 ENCOUNTER — OFFICE VISIT (OUTPATIENT)
Dept: URGENT CARE | Facility: CLINIC | Age: 24
End: 2024-07-17
Payer: COMMERCIAL

## 2024-07-17 ENCOUNTER — HOSPITAL ENCOUNTER (OUTPATIENT)
Facility: MEDICAL CENTER | Age: 24
End: 2024-07-17
Attending: PHYSICIAN ASSISTANT
Payer: COMMERCIAL

## 2024-07-17 ENCOUNTER — APPOINTMENT (OUTPATIENT)
Dept: RADIOLOGY | Facility: IMAGING CENTER | Age: 24
End: 2024-07-17
Attending: ADVANCED PRACTICE MIDWIFE
Payer: COMMERCIAL

## 2024-07-17 VITALS
SYSTOLIC BLOOD PRESSURE: 114 MMHG | BODY MASS INDEX: 43.59 KG/M2 | WEIGHT: 246 LBS | HEART RATE: 78 BPM | DIASTOLIC BLOOD PRESSURE: 60 MMHG | TEMPERATURE: 97.4 F | HEIGHT: 63 IN | RESPIRATION RATE: 18 BRPM | OXYGEN SATURATION: 98 %

## 2024-07-17 DIAGNOSIS — O26.899 VAGINAL DISCHARGE DURING PREGNANCY, ANTEPARTUM: ICD-10-CM

## 2024-07-17 DIAGNOSIS — O26.842 UTERINE SIZE DATE DISCREPANCY PREGNANCY, SECOND TRIMESTER: ICD-10-CM

## 2024-07-17 DIAGNOSIS — Z3A.24 24 WEEKS GESTATION OF PREGNANCY: ICD-10-CM

## 2024-07-17 DIAGNOSIS — N89.8 VAGINAL DISCHARGE DURING PREGNANCY, ANTEPARTUM: ICD-10-CM

## 2024-07-17 LAB
APPEARANCE UR: CLEAR
BILIRUB UR STRIP-MCNC: NEGATIVE MG/DL
COLOR UR AUTO: YELLOW
GLUCOSE UR STRIP.AUTO-MCNC: NEGATIVE MG/DL
KETONES UR STRIP.AUTO-MCNC: NEGATIVE MG/DL
LEUKOCYTE ESTERASE UR QL STRIP.AUTO: NEGATIVE
NITRITE UR QL STRIP.AUTO: NEGATIVE
PH UR STRIP.AUTO: 5.5 [PH] (ref 5–8)
PROT UR QL STRIP: NEGATIVE MG/DL
RBC UR QL AUTO: NEGATIVE
SP GR UR STRIP.AUTO: 1.02
UROBILINOGEN UR STRIP-MCNC: 0.2 MG/DL

## 2024-07-17 PROCEDURE — 87491 CHLMYD TRACH DNA AMP PROBE: CPT

## 2024-07-17 PROCEDURE — 87660 TRICHOMONAS VAGIN DIR PROBE: CPT

## 2024-07-17 PROCEDURE — 3074F SYST BP LT 130 MM HG: CPT | Performed by: PHYSICIAN ASSISTANT

## 2024-07-17 PROCEDURE — 87591 N.GONORRHOEAE DNA AMP PROB: CPT

## 2024-07-17 PROCEDURE — 87480 CANDIDA DNA DIR PROBE: CPT

## 2024-07-17 PROCEDURE — 3078F DIAST BP <80 MM HG: CPT | Performed by: PHYSICIAN ASSISTANT

## 2024-07-17 PROCEDURE — 87510 GARDNER VAG DNA DIR PROBE: CPT

## 2024-07-17 PROCEDURE — 81002 URINALYSIS NONAUTO W/O SCOPE: CPT | Performed by: PHYSICIAN ASSISTANT

## 2024-07-17 PROCEDURE — 76816 OB US FOLLOW-UP PER FETUS: CPT | Mod: TC | Performed by: ADVANCED PRACTICE MIDWIFE

## 2024-07-17 PROCEDURE — 99214 OFFICE O/P EST MOD 30 MIN: CPT | Performed by: PHYSICIAN ASSISTANT

## 2024-07-17 ASSESSMENT — ENCOUNTER SYMPTOMS
CARDIOVASCULAR NEGATIVE: 1
FLANK PAIN: 0
VAGINITIS: 1
GASTROINTESTINAL NEGATIVE: 1
RESPIRATORY NEGATIVE: 1
CHILLS: 0
FEVER: 0
NEUROLOGICAL NEGATIVE: 1

## 2024-07-17 ASSESSMENT — FIBROSIS 4 INDEX: FIB4 SCORE: 0.33

## 2024-07-18 LAB
C TRACH DNA GENITAL QL NAA+PROBE: NEGATIVE
CANDIDA DNA VAG QL PROBE+SIG AMP: NEGATIVE
G VAGINALIS DNA VAG QL PROBE+SIG AMP: NEGATIVE
N GONORRHOEA DNA GENITAL QL NAA+PROBE: NEGATIVE
SPECIMEN SOURCE: NORMAL
T VAGINALIS DNA VAG QL PROBE+SIG AMP: NEGATIVE

## 2024-07-19 ENCOUNTER — HOSPITAL ENCOUNTER (EMERGENCY)
Facility: MEDICAL CENTER | Age: 24
End: 2024-07-19
Attending: OBSTETRICS & GYNECOLOGY | Admitting: OBSTETRICS & GYNECOLOGY
Payer: COMMERCIAL

## 2024-07-19 VITALS
DIASTOLIC BLOOD PRESSURE: 58 MMHG | HEART RATE: 67 BPM | SYSTOLIC BLOOD PRESSURE: 123 MMHG | RESPIRATION RATE: 16 BRPM | HEIGHT: 63 IN | BODY MASS INDEX: 43.59 KG/M2 | WEIGHT: 246 LBS | OXYGEN SATURATION: 97 % | TEMPERATURE: 96.6 F

## 2024-07-19 DIAGNOSIS — O28.3 ABNORMAL FETAL ULTRASOUND: ICD-10-CM

## 2024-07-19 LAB
APPEARANCE UR: CLEAR
COLOR UR AUTO: YELLOW
GLUCOSE UR QL STRIP.AUTO: NEGATIVE MG/DL
KETONES UR QL STRIP.AUTO: NEGATIVE MG/DL
LEUKOCYTE ESTERASE UR QL STRIP.AUTO: NEGATIVE
NITRITE UR QL STRIP.AUTO: NEGATIVE
PH UR STRIP.AUTO: 6.5 [PH] (ref 5–8)
PROT UR QL STRIP: NEGATIVE MG/DL
RBC UR QL AUTO: NEGATIVE
SP GR UR STRIP.AUTO: 1.02 (ref 1–1.03)

## 2024-07-19 PROCEDURE — 81002 URINALYSIS NONAUTO W/O SCOPE: CPT

## 2024-07-19 PROCEDURE — 99282 EMERGENCY DEPT VISIT SF MDM: CPT

## 2024-07-19 ASSESSMENT — FIBROSIS 4 INDEX: FIB4 SCORE: 0.33

## 2024-07-22 ENCOUNTER — PATIENT MESSAGE (OUTPATIENT)
Dept: OBGYN | Facility: CLINIC | Age: 24
End: 2024-07-22
Payer: COMMERCIAL

## 2024-07-22 ENCOUNTER — TELEPHONE (OUTPATIENT)
Dept: OBGYN | Facility: CLINIC | Age: 24
End: 2024-07-22
Payer: COMMERCIAL

## 2024-07-30 ENCOUNTER — ANCILLARY PROCEDURE (OUTPATIENT)
Dept: MATERNAL FETAL MEDICINE | Facility: MEDICAL CENTER | Age: 24
End: 2024-07-30
Attending: OBSTETRICS & GYNECOLOGY
Payer: COMMERCIAL

## 2024-07-30 VITALS
BODY MASS INDEX: 43.51 KG/M2 | HEART RATE: 93 BPM | SYSTOLIC BLOOD PRESSURE: 109 MMHG | WEIGHT: 245.6 LBS | DIASTOLIC BLOOD PRESSURE: 59 MMHG

## 2024-07-30 DIAGNOSIS — R82.71 GROUP B STREPTOCOCCAL BACTERIURIA: ICD-10-CM

## 2024-07-30 DIAGNOSIS — Z34.80 SUPERVISION OF OTHER NORMAL PREGNANCY, ANTEPARTUM: ICD-10-CM

## 2024-07-30 DIAGNOSIS — O28.3 ABNORMAL FETAL ULTRASOUND: ICD-10-CM

## 2024-07-30 ASSESSMENT — FIBROSIS 4 INDEX: FIB4 SCORE: 0.33

## 2024-08-08 ENCOUNTER — ROUTINE PRENATAL (OUTPATIENT)
Dept: OBGYN | Facility: CLINIC | Age: 24
End: 2024-08-08
Payer: COMMERCIAL

## 2024-08-08 VITALS — SYSTOLIC BLOOD PRESSURE: 120 MMHG | BODY MASS INDEX: 44 KG/M2 | WEIGHT: 248.4 LBS | DIASTOLIC BLOOD PRESSURE: 60 MMHG

## 2024-08-08 DIAGNOSIS — Z34.80 SUPERVISION OF OTHER NORMAL PREGNANCY, ANTEPARTUM: ICD-10-CM

## 2024-08-08 PROCEDURE — 3074F SYST BP LT 130 MM HG: CPT | Performed by: PHYSICIAN ASSISTANT

## 2024-08-08 PROCEDURE — 3078F DIAST BP <80 MM HG: CPT | Performed by: PHYSICIAN ASSISTANT

## 2024-08-08 PROCEDURE — 0502F SUBSEQUENT PRENATAL CARE: CPT | Performed by: PHYSICIAN ASSISTANT

## 2024-08-08 ASSESSMENT — FIBROSIS 4 INDEX: FIB4 SCORE: 0.33

## 2024-08-08 NOTE — PROGRESS NOTES
Pt here today for OBFV   +FM movemnet  No VB, LOF. 's   Phone number [phone]  Pharmacy verified   EVAN given to pt  Mfm 8-27   Went to er on the 18th for low bp, is not taking baby aspirin, signs for preeclampsia

## 2024-08-08 NOTE — PROGRESS NOTES
Pt has no complaints with cramping, bleeding or pain. +FM. Pt did 1hr GTT and HgA1c at 22wk that were wnl, but pt aware she needs to repeat 1hr GTT, CBC, T pall in next few weeks. Pt also urged to start ASA 81mg for hx preeclampsia. Pt has f/u appt 8/27 to recheck fetal kidneys. RTC 4 wk or sooner prn.

## 2024-08-27 ENCOUNTER — ANCILLARY PROCEDURE (OUTPATIENT)
Dept: OBGYN | Facility: CLINIC | Age: 24
End: 2024-08-27
Payer: COMMERCIAL

## 2024-08-27 VITALS — WEIGHT: 250.8 LBS | BODY MASS INDEX: 44.43 KG/M2 | SYSTOLIC BLOOD PRESSURE: 130 MMHG | DIASTOLIC BLOOD PRESSURE: 60 MMHG

## 2024-08-27 DIAGNOSIS — O28.3 ABNORMAL FETAL ULTRASOUND: ICD-10-CM

## 2024-08-27 PROCEDURE — 76816 OB US FOLLOW-UP PER FETUS: CPT | Performed by: OBSTETRICS & GYNECOLOGY

## 2024-08-27 ASSESSMENT — FIBROSIS 4 INDEX: FIB4 SCORE: 0.33

## 2024-08-28 ENCOUNTER — HOSPITAL ENCOUNTER (EMERGENCY)
Facility: MEDICAL CENTER | Age: 24
End: 2024-08-28
Payer: COMMERCIAL

## 2024-08-28 ENCOUNTER — HOSPITAL ENCOUNTER (EMERGENCY)
Facility: MEDICAL CENTER | Age: 24
End: 2024-08-29
Attending: OBSTETRICS & GYNECOLOGY | Admitting: OBSTETRICS & GYNECOLOGY
Payer: COMMERCIAL

## 2024-08-28 VITALS
HEART RATE: 71 BPM | TEMPERATURE: 97.3 F | WEIGHT: 250 LBS | OXYGEN SATURATION: 96 % | DIASTOLIC BLOOD PRESSURE: 59 MMHG | SYSTOLIC BLOOD PRESSURE: 128 MMHG | BODY MASS INDEX: 44.3 KG/M2 | RESPIRATION RATE: 16 BRPM | HEIGHT: 63 IN

## 2024-08-28 PROCEDURE — 59025 FETAL NON-STRESS TEST: CPT

## 2024-08-28 ASSESSMENT — FIBROSIS 4 INDEX: FIB4 SCORE: 0.33

## 2024-08-29 LAB
BACTERIA GENITAL QL WET PREP: NORMAL
C TRACH DNA GENITAL QL NAA+PROBE: NEGATIVE
N GONORRHOEA DNA GENITAL QL NAA+PROBE: NEGATIVE
SIGNIFICANT IND 70042: NORMAL
SITE SITE: NORMAL
SOURCE SOURCE: NORMAL
SPECIMEN SOURCE: NORMAL

## 2024-08-29 PROCEDURE — 87491 CHLMYD TRACH DNA AMP PROBE: CPT

## 2024-08-29 PROCEDURE — 87591 N.GONORRHOEAE DNA AMP PROB: CPT

## 2024-08-29 PROCEDURE — 302449 STATCHG TRIAGE ONLY (STATISTIC)

## 2024-08-31 ENCOUNTER — HOSPITAL ENCOUNTER (OUTPATIENT)
Dept: LAB | Facility: MEDICAL CENTER | Age: 24
End: 2024-08-31
Attending: PHYSICIAN ASSISTANT
Payer: COMMERCIAL

## 2024-08-31 DIAGNOSIS — Z34.80 SUPERVISION OF OTHER NORMAL PREGNANCY, ANTEPARTUM: ICD-10-CM

## 2024-08-31 LAB
ERYTHROCYTE [DISTWIDTH] IN BLOOD BY AUTOMATED COUNT: 43.1 FL (ref 35.9–50)
GLUCOSE 1H P 50 G GLC PO SERPL-MCNC: 120 MG/DL (ref 70–139)
HCT VFR BLD AUTO: 38.1 % (ref 37–47)
HGB BLD-MCNC: 12.9 G/DL (ref 12–16)
MCH RBC QN AUTO: 31 PG (ref 27–33)
MCHC RBC AUTO-ENTMCNC: 33.9 G/DL (ref 32.2–35.5)
MCV RBC AUTO: 91.6 FL (ref 81.4–97.8)
PLATELET # BLD AUTO: 276 K/UL (ref 164–446)
PMV BLD AUTO: 11.3 FL (ref 9–12.9)
RBC # BLD AUTO: 4.16 M/UL (ref 4.2–5.4)
T PALLIDUM AB SER QL IA: NORMAL
WBC # BLD AUTO: 16.4 K/UL (ref 4.8–10.8)

## 2024-08-31 PROCEDURE — 85027 COMPLETE CBC AUTOMATED: CPT

## 2024-08-31 PROCEDURE — 36415 COLL VENOUS BLD VENIPUNCTURE: CPT

## 2024-08-31 PROCEDURE — 86780 TREPONEMA PALLIDUM: CPT

## 2024-08-31 PROCEDURE — 82950 GLUCOSE TEST: CPT

## 2024-09-12 ENCOUNTER — HOSPITAL ENCOUNTER (OUTPATIENT)
Facility: MEDICAL CENTER | Age: 24
End: 2024-09-12
Attending: ADVANCED PRACTICE MIDWIFE
Payer: COMMERCIAL

## 2024-09-12 ENCOUNTER — ROUTINE PRENATAL (OUTPATIENT)
Dept: OBGYN | Facility: CLINIC | Age: 24
End: 2024-09-12
Payer: COMMERCIAL

## 2024-09-12 VITALS — BODY MASS INDEX: 45.7 KG/M2 | SYSTOLIC BLOOD PRESSURE: 110 MMHG | DIASTOLIC BLOOD PRESSURE: 66 MMHG | WEIGHT: 258 LBS

## 2024-09-12 DIAGNOSIS — N76.0 ACUTE VAGINITIS: ICD-10-CM

## 2024-09-12 LAB — AMBIGUOUS DTTM AMBI4: NORMAL

## 2024-09-12 PROCEDURE — 87660 TRICHOMONAS VAGIN DIR PROBE: CPT

## 2024-09-12 PROCEDURE — 87510 GARDNER VAG DNA DIR PROBE: CPT

## 2024-09-12 PROCEDURE — 3074F SYST BP LT 130 MM HG: CPT | Performed by: ADVANCED PRACTICE MIDWIFE

## 2024-09-12 PROCEDURE — 0502F SUBSEQUENT PRENATAL CARE: CPT | Performed by: ADVANCED PRACTICE MIDWIFE

## 2024-09-12 PROCEDURE — 3078F DIAST BP <80 MM HG: CPT | Performed by: ADVANCED PRACTICE MIDWIFE

## 2024-09-12 PROCEDURE — 87480 CANDIDA DNA DIR PROBE: CPT

## 2024-09-12 RX ORDER — TRIAMCINOLONE ACETONIDE 1 MG/G
CREAM TOPICAL
Qty: 45 G | Refills: 1 | Status: SHIPPED | OUTPATIENT
Start: 2024-09-12

## 2024-09-12 RX ORDER — NYSTATIN 100000 [USP'U]/G
POWDER TOPICAL
Qty: 30 G | Refills: 0 | Status: SHIPPED | OUTPATIENT
Start: 2024-09-12

## 2024-09-12 RX ORDER — NYSTATIN 100000 U/G
1 CREAM TOPICAL 2 TIMES DAILY
Qty: 30 G | Refills: 1 | Status: SHIPPED | OUTPATIENT
Start: 2024-09-12

## 2024-09-12 ASSESSMENT — FIBROSIS 4 INDEX: FIB4 SCORE: 0.34

## 2024-09-12 NOTE — PROGRESS NOTES
"Pt here for OB f/u visit - Patient is 31 weeks 6 days  Pt states good fetal movement  Good #: [phone]  Patient concerned about her blood pressure.  Patient is not taking aspirin.  Patient reports \"cottage cheese\" type discharge and irritation.  Pharmacy verified:  [facility] [address]   Denies LOF, VB and UC's  Tdap discussed -       "

## 2024-09-13 ENCOUNTER — TELEPHONE (OUTPATIENT)
Dept: OBGYN | Facility: CLINIC | Age: 24
End: 2024-09-13
Payer: COMMERCIAL

## 2024-09-13 LAB
CANDIDA DNA VAG QL PROBE+SIG AMP: POSITIVE
G VAGINALIS DNA VAG QL PROBE+SIG AMP: NEGATIVE
T VAGINALIS DNA VAG QL PROBE+SIG AMP: NEGATIVE

## 2024-09-13 RX ORDER — TERCONAZOLE 0.4 %
1 CREAM WITH APPLICATOR VAGINAL
Qty: 45 G | Refills: 0 | Status: SHIPPED | OUTPATIENT
Start: 2024-09-13 | End: 2024-09-21

## 2024-09-13 NOTE — TELEPHONE ENCOUNTER
----- Message from Nurse Practitioner JUAN Bruce sent at 9/13/2024  8:30 AM PDT -----  Noted; will treat yeast. Preference is vaginal cream for 5-7 days.     9/13/2024 0904  Called pt and informed of medication for vaginal yeast infection.  Pt advised to use cream when ready to stay in bed. Pt advised to avoid intercourse while using cream. Pt agreed and verbalized understanding.  Pt asked which medication, which is previously prescribed, should she use for a rash that is just starting under her armpits and under her breasts. She could not remember. Informed pt that this RN would message [facility] and have her send pt a message re: meds. Pt agreed and verbalized understanding.

## 2024-09-19 ENCOUNTER — ANCILLARY PROCEDURE (OUTPATIENT)
Dept: OBGYN | Facility: CLINIC | Age: 24
End: 2024-09-19
Payer: COMMERCIAL

## 2024-09-19 VITALS
WEIGHT: 258.4 LBS | HEART RATE: 83 BPM | BODY MASS INDEX: 45.77 KG/M2 | SYSTOLIC BLOOD PRESSURE: 119 MMHG | DIASTOLIC BLOOD PRESSURE: 62 MMHG

## 2024-09-19 DIAGNOSIS — O28.3 ABNORMAL FETAL ULTRASOUND: ICD-10-CM

## 2024-09-19 PROCEDURE — 76816 OB US FOLLOW-UP PER FETUS: CPT | Performed by: OBSTETRICS & GYNECOLOGY

## 2024-09-19 ASSESSMENT — FIBROSIS 4 INDEX: FIB4 SCORE: 0.34

## 2024-09-21 RX ORDER — TERCONAZOLE 0.4 %
CREAM WITH APPLICATOR VAGINAL
Qty: 45 G | Refills: 0 | Status: SHIPPED
Start: 2024-09-21 | End: 2024-09-28

## 2024-09-26 ENCOUNTER — ROUTINE PRENATAL (OUTPATIENT)
Dept: OBGYN | Facility: CLINIC | Age: 24
End: 2024-09-26
Payer: COMMERCIAL

## 2024-09-26 VITALS — SYSTOLIC BLOOD PRESSURE: 118 MMHG | DIASTOLIC BLOOD PRESSURE: 64 MMHG | WEIGHT: 262 LBS | BODY MASS INDEX: 46.41 KG/M2

## 2024-09-26 DIAGNOSIS — Z34.83 ENCOUNTER FOR SUPERVISION OF OTHER NORMAL PREGNANCY, THIRD TRIMESTER: ICD-10-CM

## 2024-09-26 PROCEDURE — 0502F SUBSEQUENT PRENATAL CARE: CPT | Performed by: ADVANCED PRACTICE MIDWIFE

## 2024-09-26 PROCEDURE — 90471 IMMUNIZATION ADMIN: CPT | Performed by: ADVANCED PRACTICE MIDWIFE

## 2024-09-26 PROCEDURE — 90715 TDAP VACCINE 7 YRS/> IM: CPT | Mod: JZ | Performed by: ADVANCED PRACTICE MIDWIFE

## 2024-09-26 ASSESSMENT — FIBROSIS 4 INDEX: FIB4 SCORE: 0.34

## 2024-09-26 NOTE — PROGRESS NOTES
Pt here today for OB follow up  Pt states she is experiencing jody abdul   Reports +FM  Good # [phone]  Pharmacy Confirmed.  Chaperone offered and not indicated.   Pt given EVAN sheet and instructions  Pt would like TDAP, consent signed   Pt declines BTL  Pt has growth u/s scheduled on 10/21/2024

## 2024-09-26 NOTE — PROGRESS NOTES
Pt is a 23 y.o.   at 33w6d  gestation here for JAGDISH visit. She reports good fetal movement. Denies vaginal bleeding and denies cramping/regular contractions. She reports overall today she is feeling well. No recent ER visits since last seen. No appointments for MFM until 10/21 for growth. Possible IOL 38-39 weeks. Will schedule at next visit based on MFM recommendations.  Adequate hydration reviewed in detail with patient. Precautions and warning signs reviewed with patient.  RTC in 2 week(s) for JAGDISH visit.

## 2024-09-26 NOTE — LETTER
"Count Your Baby's Movements  Another step to a healthy delivery    Chen Gupta  [facility]  Dept: [phone]    How Many Weeks Pregnant? 33w6d    Date to Begin Countin2024              How to use this chart    One way for your physician to keep track of your baby's health is by knowing how often the baby moves (or \"kicks\") in your womb.  You can help your physician to do this by using this chart every day.    Every day, you should see how many hours it takes for your baby to move 10 times.  Start in the morning, as soon as you get up.    First, write down the time your baby moves until you get to 10.  Check off one box every time your baby moves until you get to 10.  Write down the time you finished counting in the last column.  Total how long it took to count up all 10 movements.  Finally, fill in the box that shows how long this took.  After counting 10 movements, you no longer have to count any more that day.  The next morning, just start counting again as soon as you get up.    What should you call a \"movement\"?  It is hard to say, because it will feel different from one mother to another and from one pregnancy to the next.  The important thing is that you count the movements the same way throughout your pregnancy.  If you have more questions, you should ask your physician.    Count carefully every day!  SAMPLE:  Week 28    How many hours did it take to feel 10 movements?       Start  Time     1     2     3     4     5     6     7     8     9     10   Finish Time   Mon 8:20           11:40                  Fri               Sat               Sun                 IMPORTANT: You should contact your physician if it takes more than two hours for you to feel 10 movements.  Each morning, write down the time and start to count the movements of your baby.  Keep track by checking off one box every time you feel one movement.  " "When you have felt 10 \"kicks\", write down the time you finished counting in the last column.  Then fill in the   box (over the check luiz) for the number of hours it took.  Be sure to read the complete instructions on the previous page.            "

## 2024-10-01 NOTE — NON-PROVIDER
Delivery Note for Vaginal Delivery     Stage 1:  20 y.o. y/o  at 37w1d weeks admitted for Induction of Labor, for GHTN. Her pregnancy has been complicated by GHTN. Her labor progressed with use of cytotec, cooks catheter, AROM, and pitocin.  The patient was noted to be GBS negative.  Fetal monitoring during labor was overall category 2.  Patient had an epidural for pain control.     Stage II: At 4:45am, she was noted to be complete.  She then pushed for 12 minutes to deliver a viable, vigorous female infant on 2021 at 5:29am.  The delivery was uncomplicated. Shoulders were delivered easily. The infant was placed immediately upon mother’s abdomen.  Apgars at 1 and 5 minutes were 8/9 and the infant has not yet been weighed.    Stage III: Attention was then turned to active management of the third stage. The placenta was delivered spontaneously Renteria presentation with gentle manual traction on the umbilical cord with countertraction maintained suprapubically.  On inspection, the placenta was intact and had normal insertion.  Upon delivery of the placenta, good hemostasis was noted with fundal massage and a 40 unit bolus of pitocin in IV infusion was administered per protocol.     Laceration repair: The perineum was inspected following delivery. A vaginal laceration was repaired with 3-0 vicryl after obtaining the patient's verbal consent in the usual fashion, under epidural anesthesis with good hemostasis achieved. A periurethral abrasion was noted that was hemostatic and left unrepaired.     Estimated blood loss for the above procedures was 150cc.     Mother and infant in stable condition, and family pleased with birth.     POORNIMA PARSONS.          general

## 2024-10-04 ENCOUNTER — HOSPITAL ENCOUNTER (EMERGENCY)
Facility: MEDICAL CENTER | Age: 24
End: 2024-10-04
Attending: OBSTETRICS & GYNECOLOGY | Admitting: OBSTETRICS & GYNECOLOGY
Payer: COMMERCIAL

## 2024-10-04 LAB
ALBUMIN SERPL BCP-MCNC: 2.9 G/DL (ref 3.2–4.9)
ALBUMIN/GLOB SERPL: 1 G/DL
ALP SERPL-CCNC: 177 U/L (ref 30–99)
ALT SERPL-CCNC: 16 U/L (ref 2–50)
ANION GAP SERPL CALC-SCNC: 11 MMOL/L (ref 7–16)
AST SERPL-CCNC: 18 U/L (ref 12–45)
BASOPHILS # BLD AUTO: 0.6 % (ref 0–1.8)
BASOPHILS # BLD: 0.09 K/UL (ref 0–0.12)
BILIRUB SERPL-MCNC: <0.2 MG/DL (ref 0.1–1.5)
BUN SERPL-MCNC: 9 MG/DL (ref 8–22)
CALCIUM ALBUM COR SERPL-MCNC: 9.8 MG/DL (ref 8.5–10.5)
CALCIUM SERPL-MCNC: 8.9 MG/DL (ref 8.5–10.5)
CHLORIDE SERPL-SCNC: 105 MMOL/L (ref 96–112)
CO2 SERPL-SCNC: 21 MMOL/L (ref 20–33)
CREAT SERPL-MCNC: 0.5 MG/DL (ref 0.5–1.4)
CREAT UR-MCNC: 66 MG/DL
EOSINOPHIL # BLD AUTO: 0.13 K/UL (ref 0–0.51)
EOSINOPHIL NFR BLD: 0.8 % (ref 0–6.9)
ERYTHROCYTE [DISTWIDTH] IN BLOOD BY AUTOMATED COUNT: 43.7 FL (ref 35.9–50)
GFR SERPLBLD CREATININE-BSD FMLA CKD-EPI: 133 ML/MIN/1.73 M 2
GLOBULIN SER CALC-MCNC: 2.9 G/DL (ref 1.9–3.5)
GLUCOSE SERPL-MCNC: 134 MG/DL (ref 65–99)
HCT VFR BLD AUTO: 37.6 % (ref 37–47)
HGB BLD-MCNC: 12.7 G/DL (ref 12–16)
HOLDING TUBE BB 8507: NORMAL
IMM GRANULOCYTES # BLD AUTO: 0.51 K/UL (ref 0–0.11)
IMM GRANULOCYTES NFR BLD AUTO: 3.3 % (ref 0–0.9)
LYMPHOCYTES # BLD AUTO: 2.43 K/UL (ref 1–4.8)
LYMPHOCYTES NFR BLD: 15.6 % (ref 22–41)
MCH RBC QN AUTO: 30.4 PG (ref 27–33)
MCHC RBC AUTO-ENTMCNC: 33.8 G/DL (ref 32.2–35.5)
MCV RBC AUTO: 90 FL (ref 81.4–97.8)
MONOCYTES # BLD AUTO: 1.27 K/UL (ref 0–0.85)
MONOCYTES NFR BLD AUTO: 8.2 % (ref 0–13.4)
NEUTROPHILS # BLD AUTO: 11.14 K/UL (ref 1.82–7.42)
NEUTROPHILS NFR BLD: 71.5 % (ref 44–72)
NRBC # BLD AUTO: 0 K/UL
NRBC BLD-RTO: 0 /100 WBC (ref 0–0.2)
PLATELET # BLD AUTO: 242 K/UL (ref 164–446)
PMV BLD AUTO: 11.1 FL (ref 9–12.9)
POTASSIUM SERPL-SCNC: 3.7 MMOL/L (ref 3.6–5.5)
PROT SERPL-MCNC: 5.8 G/DL (ref 6–8.2)
PROT UR-MCNC: 8.1 MG/DL (ref 0–15)
PROT/CREAT UR: 123 MG/G (ref 10–107)
RBC # BLD AUTO: 4.18 M/UL (ref 4.2–5.4)
SODIUM SERPL-SCNC: 137 MMOL/L (ref 135–145)
WBC # BLD AUTO: 15.6 K/UL (ref 4.8–10.8)

## 2024-10-04 PROCEDURE — 99282 EMERGENCY DEPT VISIT SF MDM: CPT

## 2024-10-04 PROCEDURE — 36415 COLL VENOUS BLD VENIPUNCTURE: CPT

## 2024-10-04 PROCEDURE — 80053 COMPREHEN METABOLIC PANEL: CPT

## 2024-10-04 PROCEDURE — 84156 ASSAY OF PROTEIN URINE: CPT

## 2024-10-04 PROCEDURE — 82570 ASSAY OF URINE CREATININE: CPT

## 2024-10-04 PROCEDURE — 85025 COMPLETE CBC W/AUTO DIFF WBC: CPT

## 2024-10-04 PROCEDURE — 59025 FETAL NON-STRESS TEST: CPT

## 2024-10-04 ASSESSMENT — FIBROSIS 4 INDEX
FIB4 SCORE: 0.34
FIB4 SCORE: 0.34

## 2024-10-05 VITALS
SYSTOLIC BLOOD PRESSURE: 136 MMHG | OXYGEN SATURATION: 97 % | BODY MASS INDEX: 46.42 KG/M2 | DIASTOLIC BLOOD PRESSURE: 61 MMHG | HEART RATE: 80 BPM | WEIGHT: 262 LBS | TEMPERATURE: 97.7 F | HEIGHT: 63 IN | RESPIRATION RATE: 16 BRPM

## 2024-10-05 PROBLEM — R03.0 ELEVATED BLOOD PRESSURE READING WITHOUT DIAGNOSIS OF HYPERTENSION: Status: ACTIVE | Noted: 2024-10-05

## 2024-10-08 ENCOUNTER — ROUTINE PRENATAL (OUTPATIENT)
Dept: OBGYN | Facility: CLINIC | Age: 24
End: 2024-10-08
Payer: COMMERCIAL

## 2024-10-08 ENCOUNTER — HOSPITAL ENCOUNTER (OUTPATIENT)
Facility: MEDICAL CENTER | Age: 24
End: 2024-10-08
Attending: ADVANCED PRACTICE MIDWIFE
Payer: COMMERCIAL

## 2024-10-08 VITALS — SYSTOLIC BLOOD PRESSURE: 112 MMHG | DIASTOLIC BLOOD PRESSURE: 60 MMHG | WEIGHT: 267 LBS | BODY MASS INDEX: 47.31 KG/M2

## 2024-10-08 DIAGNOSIS — R03.0 ELEVATED BLOOD PRESSURE READING WITHOUT DIAGNOSIS OF HYPERTENSION: ICD-10-CM

## 2024-10-08 DIAGNOSIS — Z34.83 ENCOUNTER FOR SUPERVISION OF OTHER NORMAL PREGNANCY, THIRD TRIMESTER: ICD-10-CM

## 2024-10-08 DIAGNOSIS — Z34.80 SUPERVISION OF OTHER NORMAL PREGNANCY, ANTEPARTUM: ICD-10-CM

## 2024-10-08 PROCEDURE — 87150 DNA/RNA AMPLIFIED PROBE: CPT

## 2024-10-08 PROCEDURE — 3078F DIAST BP <80 MM HG: CPT | Performed by: ADVANCED PRACTICE MIDWIFE

## 2024-10-08 PROCEDURE — 0502F SUBSEQUENT PRENATAL CARE: CPT | Performed by: ADVANCED PRACTICE MIDWIFE

## 2024-10-08 PROCEDURE — 87081 CULTURE SCREEN ONLY: CPT

## 2024-10-08 PROCEDURE — 3074F SYST BP LT 130 MM HG: CPT | Performed by: ADVANCED PRACTICE MIDWIFE

## 2024-10-08 ASSESSMENT — FIBROSIS 4 INDEX: FIB4 SCORE: 0.46

## 2024-10-10 LAB — GP B STREP DNA SPEC QL NAA+PROBE: NEGATIVE

## 2024-10-15 ENCOUNTER — ANCILLARY PROCEDURE (OUTPATIENT)
Dept: OBGYN | Facility: CLINIC | Age: 24
End: 2024-10-15
Attending: OBSTETRICS & GYNECOLOGY
Payer: COMMERCIAL

## 2024-10-15 VITALS
WEIGHT: 266.6 LBS | DIASTOLIC BLOOD PRESSURE: 70 MMHG | SYSTOLIC BLOOD PRESSURE: 132 MMHG | BODY MASS INDEX: 47.24 KG/M2 | HEART RATE: 101 BPM

## 2024-10-15 DIAGNOSIS — O28.3 ABNORMAL FETAL ULTRASOUND: ICD-10-CM

## 2024-10-15 PROCEDURE — 76816 OB US FOLLOW-UP PER FETUS: CPT | Performed by: OBSTETRICS & GYNECOLOGY

## 2024-10-15 ASSESSMENT — FIBROSIS 4 INDEX: FIB4 SCORE: 0.46

## 2024-10-18 ENCOUNTER — ROUTINE PRENATAL (OUTPATIENT)
Dept: OBGYN | Facility: CLINIC | Age: 24
End: 2024-10-18
Payer: COMMERCIAL

## 2024-10-18 VITALS — DIASTOLIC BLOOD PRESSURE: 72 MMHG | SYSTOLIC BLOOD PRESSURE: 128 MMHG | WEIGHT: 267 LBS | BODY MASS INDEX: 47.31 KG/M2

## 2024-10-18 DIAGNOSIS — Z34.83 ENCOUNTER FOR SUPERVISION OF OTHER NORMAL PREGNANCY, THIRD TRIMESTER: ICD-10-CM

## 2024-10-18 DIAGNOSIS — O28.3 ABNORMAL FETAL ULTRASOUND: ICD-10-CM

## 2024-10-18 PROCEDURE — 3074F SYST BP LT 130 MM HG: CPT | Performed by: ADVANCED PRACTICE MIDWIFE

## 2024-10-18 PROCEDURE — 90656 IIV3 VACC NO PRSV 0.5 ML IM: CPT | Performed by: ADVANCED PRACTICE MIDWIFE

## 2024-10-18 PROCEDURE — 90471 IMMUNIZATION ADMIN: CPT | Performed by: ADVANCED PRACTICE MIDWIFE

## 2024-10-18 PROCEDURE — 3078F DIAST BP <80 MM HG: CPT | Performed by: ADVANCED PRACTICE MIDWIFE

## 2024-10-18 PROCEDURE — 0502F SUBSEQUENT PRENATAL CARE: CPT | Performed by: ADVANCED PRACTICE MIDWIFE

## 2024-10-18 ASSESSMENT — FIBROSIS 4 INDEX: FIB4 SCORE: 0.46

## 2024-10-25 ENCOUNTER — HOSPITAL ENCOUNTER (EMERGENCY)
Facility: MEDICAL CENTER | Age: 24
End: 2024-10-25
Attending: OBSTETRICS & GYNECOLOGY | Admitting: OBSTETRICS & GYNECOLOGY
Payer: COMMERCIAL

## 2024-10-25 VITALS
OXYGEN SATURATION: 98 % | HEIGHT: 63 IN | HEART RATE: 67 BPM | SYSTOLIC BLOOD PRESSURE: 132 MMHG | WEIGHT: 266 LBS | TEMPERATURE: 97.2 F | RESPIRATION RATE: 16 BRPM | BODY MASS INDEX: 47.13 KG/M2 | DIASTOLIC BLOOD PRESSURE: 61 MMHG

## 2024-10-25 PROCEDURE — 59025 FETAL NON-STRESS TEST: CPT

## 2024-10-25 PROCEDURE — 99282 EMERGENCY DEPT VISIT SF MDM: CPT | Mod: 25

## 2024-10-25 ASSESSMENT — FIBROSIS 4 INDEX: FIB4 SCORE: 0.46

## 2024-10-26 ENCOUNTER — HOSPITAL ENCOUNTER (INPATIENT)
Facility: MEDICAL CENTER | Age: 24
LOS: 2 days | End: 2024-10-28
Attending: OBSTETRICS & GYNECOLOGY | Admitting: OBSTETRICS & GYNECOLOGY
Payer: COMMERCIAL

## 2024-10-26 ENCOUNTER — ANESTHESIA (OUTPATIENT)
Dept: ANESTHESIOLOGY | Facility: MEDICAL CENTER | Age: 24
End: 2024-10-26
Payer: COMMERCIAL

## 2024-10-26 ENCOUNTER — ANESTHESIA EVENT (OUTPATIENT)
Dept: ANESTHESIOLOGY | Facility: MEDICAL CENTER | Age: 24
End: 2024-10-26
Payer: COMMERCIAL

## 2024-10-26 ENCOUNTER — APPOINTMENT (OUTPATIENT)
Dept: OBGYN | Facility: MEDICAL CENTER | Age: 24
End: 2024-10-26
Attending: OBSTETRICS & GYNECOLOGY
Payer: COMMERCIAL

## 2024-10-26 DIAGNOSIS — D62 ACUTE BLOOD LOSS ANEMIA (ABLA): ICD-10-CM

## 2024-10-26 PROBLEM — Z34.90 ENCOUNTER FOR INDUCTION OF LABOR: Status: ACTIVE | Noted: 2024-10-26

## 2024-10-26 PROBLEM — O13.3 GESTATIONAL HYPERTENSION, THIRD TRIMESTER: Status: ACTIVE | Noted: 2024-10-26

## 2024-10-26 LAB
ALBUMIN SERPL BCP-MCNC: 3 G/DL (ref 3.2–4.9)
ALBUMIN/GLOB SERPL: 1 G/DL
ALP SERPL-CCNC: 196 U/L (ref 30–99)
ALT SERPL-CCNC: 18 U/L (ref 2–50)
ANION GAP SERPL CALC-SCNC: 13 MMOL/L (ref 7–16)
AST SERPL-CCNC: 22 U/L (ref 12–45)
BASOPHILS # BLD AUTO: 0.4 % (ref 0–1.8)
BASOPHILS # BLD: 0.05 K/UL (ref 0–0.12)
BILIRUB SERPL-MCNC: 0.2 MG/DL (ref 0.1–1.5)
BUN SERPL-MCNC: 8 MG/DL (ref 8–22)
CALCIUM ALBUM COR SERPL-MCNC: 9.8 MG/DL (ref 8.5–10.5)
CALCIUM SERPL-MCNC: 9 MG/DL (ref 8.5–10.5)
CHLORIDE SERPL-SCNC: 105 MMOL/L (ref 96–112)
CO2 SERPL-SCNC: 18 MMOL/L (ref 20–33)
CREAT SERPL-MCNC: 0.53 MG/DL (ref 0.5–1.4)
CREAT UR-MCNC: 102 MG/DL
EOSINOPHIL # BLD AUTO: 0.1 K/UL (ref 0–0.51)
EOSINOPHIL NFR BLD: 0.8 % (ref 0–6.9)
ERYTHROCYTE [DISTWIDTH] IN BLOOD BY AUTOMATED COUNT: 45.5 FL (ref 35.9–50)
GFR SERPLBLD CREATININE-BSD FMLA CKD-EPI: 131 ML/MIN/1.73 M 2
GLOBULIN SER CALC-MCNC: 3.1 G/DL (ref 1.9–3.5)
GLUCOSE SERPL-MCNC: 116 MG/DL (ref 65–99)
HCT VFR BLD AUTO: 40.1 % (ref 37–47)
HGB BLD-MCNC: 13.5 G/DL (ref 12–16)
HOLDING TUBE BB 8507: NORMAL
IMM GRANULOCYTES # BLD AUTO: 0.26 K/UL (ref 0–0.11)
IMM GRANULOCYTES NFR BLD AUTO: 2 % (ref 0–0.9)
LDH SERPL L TO P-CCNC: 259 U/L (ref 107–266)
LYMPHOCYTES # BLD AUTO: 2.11 K/UL (ref 1–4.8)
LYMPHOCYTES NFR BLD: 16.5 % (ref 22–41)
MCH RBC QN AUTO: 30.5 PG (ref 27–33)
MCHC RBC AUTO-ENTMCNC: 33.7 G/DL (ref 32.2–35.5)
MCV RBC AUTO: 90.5 FL (ref 81.4–97.8)
MONOCYTES # BLD AUTO: 0.87 K/UL (ref 0–0.85)
MONOCYTES NFR BLD AUTO: 6.8 % (ref 0–13.4)
NEUTROPHILS # BLD AUTO: 9.41 K/UL (ref 1.82–7.42)
NEUTROPHILS NFR BLD: 73.5 % (ref 44–72)
NRBC # BLD AUTO: 0 K/UL
NRBC BLD-RTO: 0 /100 WBC (ref 0–0.2)
PLATELET # BLD AUTO: 225 K/UL (ref 164–446)
PMV BLD AUTO: 12.3 FL (ref 9–12.9)
POTASSIUM SERPL-SCNC: 3.9 MMOL/L (ref 3.6–5.5)
PROT SERPL-MCNC: 6.1 G/DL (ref 6–8.2)
PROT UR-MCNC: 12.7 MG/DL (ref 0–15)
PROT/CREAT UR: 125 MG/G (ref 10–107)
RBC # BLD AUTO: 4.43 M/UL (ref 4.2–5.4)
SODIUM SERPL-SCNC: 136 MMOL/L (ref 135–145)
T PALLIDUM AB SER QL IA: NORMAL
URATE SERPL-MCNC: 7.4 MG/DL (ref 1.9–8.2)
WBC # BLD AUTO: 12.8 K/UL (ref 4.8–10.8)

## 2024-10-26 PROCEDURE — 84550 ASSAY OF BLOOD/URIC ACID: CPT

## 2024-10-26 PROCEDURE — 10H073Z INSERTION OF MONITORING ELECTRODE INTO PRODUCTS OF CONCEPTION, VIA NATURAL OR ARTIFICIAL OPENING: ICD-10-PCS | Performed by: OBSTETRICS & GYNECOLOGY

## 2024-10-26 PROCEDURE — 82570 ASSAY OF URINE CREATININE: CPT

## 2024-10-26 PROCEDURE — 59409 OBSTETRICAL CARE: CPT

## 2024-10-26 PROCEDURE — 304965 HCHG RECOVERY SERVICES

## 2024-10-26 PROCEDURE — 86780 TREPONEMA PALLIDUM: CPT

## 2024-10-26 PROCEDURE — 700105 HCHG RX REV CODE 258: Performed by: ADVANCED PRACTICE MIDWIFE

## 2024-10-26 PROCEDURE — 84156 ASSAY OF PROTEIN URINE: CPT

## 2024-10-26 PROCEDURE — 303615 HCHG EPIDURAL/SPINAL ANESTHESIA FOR LABOR

## 2024-10-26 PROCEDURE — A9270 NON-COVERED ITEM OR SERVICE: HCPCS | Performed by: ADVANCED PRACTICE MIDWIFE

## 2024-10-26 PROCEDURE — 36415 COLL VENOUS BLD VENIPUNCTURE: CPT

## 2024-10-26 PROCEDURE — 10H07YZ INSERTION OF OTHER DEVICE INTO PRODUCTS OF CONCEPTION, VIA NATURAL OR ARTIFICIAL OPENING: ICD-10-PCS | Performed by: OBSTETRICS & GYNECOLOGY

## 2024-10-26 PROCEDURE — 700111 HCHG RX REV CODE 636 W/ 250 OVERRIDE (IP): Performed by: ANESTHESIOLOGY

## 2024-10-26 PROCEDURE — 80053 COMPREHEN METABOLIC PANEL: CPT

## 2024-10-26 PROCEDURE — 770002 HCHG ROOM/CARE - OB PRIVATE (112)

## 2024-10-26 PROCEDURE — 4A1H7CZ MONITORING OF PRODUCTS OF CONCEPTION, CARDIAC RATE, VIA NATURAL OR ARTIFICIAL OPENING: ICD-10-PCS | Performed by: OBSTETRICS & GYNECOLOGY

## 2024-10-26 PROCEDURE — 83615 LACTATE (LD) (LDH) ENZYME: CPT

## 2024-10-26 PROCEDURE — 85025 COMPLETE CBC W/AUTO DIFF WBC: CPT

## 2024-10-26 PROCEDURE — 700111 HCHG RX REV CODE 636 W/ 250 OVERRIDE (IP): Performed by: ADVANCED PRACTICE MIDWIFE

## 2024-10-26 PROCEDURE — 10907ZC DRAINAGE OF AMNIOTIC FLUID, THERAPEUTIC FROM PRODUCTS OF CONCEPTION, VIA NATURAL OR ARTIFICIAL OPENING: ICD-10-PCS | Performed by: OBSTETRICS & GYNECOLOGY

## 2024-10-26 RX ORDER — ONDANSETRON 2 MG/ML
4 INJECTION INTRAMUSCULAR; INTRAVENOUS EVERY 6 HOURS PRN
Status: DISCONTINUED | OUTPATIENT
Start: 2024-10-26 | End: 2024-10-27 | Stop reason: HOSPADM

## 2024-10-26 RX ORDER — LIDOCAINE HYDROCHLORIDE 10 MG/ML
20 INJECTION, SOLUTION INFILTRATION; PERINEURAL
Status: DISCONTINUED | OUTPATIENT
Start: 2024-10-26 | End: 2024-10-27 | Stop reason: HOSPADM

## 2024-10-26 RX ORDER — ROPIVACAINE HYDROCHLORIDE 2 MG/ML
INJECTION, SOLUTION EPIDURAL; INFILTRATION; PERINEURAL CONTINUOUS
Status: DISCONTINUED | OUTPATIENT
Start: 2024-10-26 | End: 2024-10-28 | Stop reason: HOSPADM

## 2024-10-26 RX ORDER — IBUPROFEN 800 MG/1
800 TABLET, FILM COATED ORAL
Status: DISCONTINUED | OUTPATIENT
Start: 2024-10-26 | End: 2024-10-27 | Stop reason: HOSPADM

## 2024-10-26 RX ORDER — ONDANSETRON 4 MG/1
4 TABLET, ORALLY DISINTEGRATING ORAL EVERY 6 HOURS PRN
Status: DISCONTINUED | OUTPATIENT
Start: 2024-10-26 | End: 2024-10-27 | Stop reason: HOSPADM

## 2024-10-26 RX ORDER — HYDROXYZINE HYDROCHLORIDE 50 MG/1
50 TABLET, FILM COATED ORAL EVERY 6 HOURS PRN
Status: DISCONTINUED | OUTPATIENT
Start: 2024-10-26 | End: 2024-10-27 | Stop reason: HOSPADM

## 2024-10-26 RX ORDER — EPHEDRINE SULFATE 50 MG/ML
5 INJECTION, SOLUTION INTRAVENOUS
Status: DISCONTINUED | OUTPATIENT
Start: 2024-10-26 | End: 2024-10-27 | Stop reason: HOSPADM

## 2024-10-26 RX ORDER — ALUMINA, MAGNESIA, AND SIMETHICONE 2400; 2400; 240 MG/30ML; MG/30ML; MG/30ML
30 SUSPENSION ORAL EVERY 6 HOURS PRN
Status: DISCONTINUED | OUTPATIENT
Start: 2024-10-26 | End: 2024-10-27 | Stop reason: HOSPADM

## 2024-10-26 RX ORDER — SODIUM CHLORIDE 9 MG/ML
250 INJECTION, SOLUTION INTRAVENOUS PRN
Status: DISCONTINUED | OUTPATIENT
Start: 2024-10-26 | End: 2024-10-27 | Stop reason: HOSPADM

## 2024-10-26 RX ORDER — OXYTOCIN 10 [USP'U]/ML
10 INJECTION, SOLUTION INTRAMUSCULAR; INTRAVENOUS
Status: DISCONTINUED | OUTPATIENT
Start: 2024-10-26 | End: 2024-10-27 | Stop reason: HOSPADM

## 2024-10-26 RX ORDER — SODIUM CHLORIDE 9 MG/ML
1000 INJECTION, SOLUTION INTRAVENOUS
Status: DISCONTINUED | OUTPATIENT
Start: 2024-10-26 | End: 2024-10-27 | Stop reason: HOSPADM

## 2024-10-26 RX ORDER — ACETAMINOPHEN 500 MG
1000 TABLET ORAL
Status: DISCONTINUED | OUTPATIENT
Start: 2024-10-26 | End: 2024-10-27 | Stop reason: HOSPADM

## 2024-10-26 RX ORDER — SODIUM CHLORIDE 9 MG/ML
INJECTION, SOLUTION INTRAVENOUS CONTINUOUS
Status: DISCONTINUED | OUTPATIENT
Start: 2024-10-26 | End: 2024-10-28 | Stop reason: HOSPADM

## 2024-10-26 RX ORDER — TERBUTALINE SULFATE 1 MG/ML
0.25 INJECTION, SOLUTION SUBCUTANEOUS
Status: DISCONTINUED | OUTPATIENT
Start: 2024-10-26 | End: 2024-10-27 | Stop reason: HOSPADM

## 2024-10-26 RX ADMIN — OXYTOCIN 10 UNITS: 10 INJECTION, SOLUTION INTRAMUSCULAR; INTRAVENOUS at 21:50

## 2024-10-26 RX ADMIN — ROPIVACAINE HYDROCHLORIDE: 2 INJECTION EPIDURAL; INFILTRATION; PERINEURAL at 19:55

## 2024-10-26 RX ADMIN — OXYTOCIN 1 MILLI-UNITS/MIN: 10 INJECTION, SOLUTION INTRAMUSCULAR; INTRAVENOUS at 11:05

## 2024-10-26 RX ADMIN — SODIUM CHLORIDE: 9 INJECTION, SOLUTION INTRAVENOUS at 11:06

## 2024-10-26 SDOH — ECONOMIC STABILITY: TRANSPORTATION INSECURITY
IN THE PAST 12 MONTHS, HAS LACK OF RELIABLE TRANSPORTATION KEPT YOU FROM MEDICAL APPOINTMENTS, MEETINGS, WORK OR FROM GETTING THINGS NEEDED FOR DAILY LIVING?: NO

## 2024-10-26 SDOH — ECONOMIC STABILITY: TRANSPORTATION INSECURITY
IN THE PAST 12 MONTHS, HAS THE LACK OF TRANSPORTATION KEPT YOU FROM MEDICAL APPOINTMENTS OR FROM GETTING MEDICATIONS?: NO

## 2024-10-26 ASSESSMENT — LIFESTYLE VARIABLES
EVER HAD A DRINK FIRST THING IN THE MORNING TO STEADY YOUR NERVES TO GET RID OF A HANGOVER: NO
TOTAL SCORE: 0
HOW MANY TIMES IN THE PAST YEAR HAVE YOU HAD 5 OR MORE DRINKS IN A DAY: 0
EVER FELT BAD OR GUILTY ABOUT YOUR DRINKING: NO
AVERAGE NUMBER OF DAYS PER WEEK YOU HAVE A DRINK CONTAINING ALCOHOL: 0
TOTAL SCORE: 0
TOTAL SCORE: 0
HAVE YOU EVER FELT YOU SHOULD CUT DOWN ON YOUR DRINKING: NO
ON A TYPICAL DAY WHEN YOU DRINK ALCOHOL HOW MANY DRINKS DO YOU HAVE: 0
CONSUMPTION TOTAL: NEGATIVE
ALCOHOL_USE: NO
HAVE PEOPLE ANNOYED YOU BY CRITICIZING YOUR DRINKING: NO

## 2024-10-26 ASSESSMENT — SOCIAL DETERMINANTS OF HEALTH (SDOH)
WITHIN THE LAST YEAR, HAVE YOU BEEN KICKED, HIT, SLAPPED, OR OTHERWISE PHYSICALLY HURT BY YOUR PARTNER OR EX-PARTNER?: NO
IN THE PAST 12 MONTHS, HAS THE ELECTRIC, GAS, OIL, OR WATER COMPANY THREATENED TO SHUT OFF SERVICE IN YOUR HOME?: NO
WITHIN THE LAST YEAR, HAVE YOU BEEN AFRAID OF YOUR PARTNER OR EX-PARTNER?: NO
WITHIN THE LAST YEAR, HAVE TO BEEN RAPED OR FORCED TO HAVE ANY KIND OF SEXUAL ACTIVITY BY YOUR PARTNER OR EX-PARTNER?: NO
WITHIN THE LAST YEAR, HAVE YOU BEEN HUMILIATED OR EMOTIONALLY ABUSED IN OTHER WAYS BY YOUR PARTNER OR EX-PARTNER?: NO
WITHIN THE PAST 12 MONTHS, THE FOOD YOU BOUGHT JUST DIDN'T LAST AND YOU DIDN'T HAVE MONEY TO GET MORE: NEVER TRUE

## 2024-10-26 ASSESSMENT — PATIENT HEALTH QUESTIONNAIRE - PHQ9
1. LITTLE INTEREST OR PLEASURE IN DOING THINGS: NOT AT ALL
2. FEELING DOWN, DEPRESSED, IRRITABLE, OR HOPELESS: NOT AT ALL
SUM OF ALL RESPONSES TO PHQ9 QUESTIONS 1 AND 2: 0

## 2024-10-26 ASSESSMENT — PAIN SCALES - GENERAL
PAIN_LEVEL: 3
PAINLEVEL: 0 - NO PAIN

## 2024-10-26 ASSESSMENT — FIBROSIS 4 INDEX: FIB4 SCORE: 0.46

## 2024-10-27 LAB
ERYTHROCYTE [DISTWIDTH] IN BLOOD BY AUTOMATED COUNT: 46.4 FL (ref 35.9–50)
HCT VFR BLD AUTO: 38 % (ref 37–47)
HGB BLD-MCNC: 12.8 G/DL (ref 12–16)
MCH RBC QN AUTO: 30.5 PG (ref 27–33)
MCHC RBC AUTO-ENTMCNC: 33.7 G/DL (ref 32.2–35.5)
MCV RBC AUTO: 90.5 FL (ref 81.4–97.8)
PLATELET # BLD AUTO: 209 K/UL (ref 164–446)
PMV BLD AUTO: 11.8 FL (ref 9–12.9)
RBC # BLD AUTO: 4.2 M/UL (ref 4.2–5.4)
WBC # BLD AUTO: 17.8 K/UL (ref 4.8–10.8)

## 2024-10-27 PROCEDURE — 700102 HCHG RX REV CODE 250 W/ 637 OVERRIDE(OP): Performed by: ADVANCED PRACTICE MIDWIFE

## 2024-10-27 PROCEDURE — A9270 NON-COVERED ITEM OR SERVICE: HCPCS | Performed by: ADVANCED PRACTICE MIDWIFE

## 2024-10-27 PROCEDURE — 770002 HCHG ROOM/CARE - OB PRIVATE (112)

## 2024-10-27 PROCEDURE — 36415 COLL VENOUS BLD VENIPUNCTURE: CPT

## 2024-10-27 PROCEDURE — 85027 COMPLETE CBC AUTOMATED: CPT

## 2024-10-27 RX ORDER — IBUPROFEN 800 MG/1
800 TABLET, FILM COATED ORAL EVERY 8 HOURS PRN
Status: DISCONTINUED | OUTPATIENT
Start: 2024-10-27 | End: 2024-10-28 | Stop reason: HOSPADM

## 2024-10-27 RX ORDER — MISOPROSTOL 200 UG/1
600 TABLET ORAL
Status: DISCONTINUED | OUTPATIENT
Start: 2024-10-27 | End: 2024-10-28 | Stop reason: HOSPADM

## 2024-10-27 RX ORDER — DOCUSATE SODIUM 100 MG/1
100 CAPSULE, LIQUID FILLED ORAL 2 TIMES DAILY PRN
Status: DISCONTINUED | OUTPATIENT
Start: 2024-10-27 | End: 2024-10-28

## 2024-10-27 RX ORDER — ACETAMINOPHEN 500 MG
1000 TABLET ORAL EVERY 6 HOURS PRN
Status: DISCONTINUED | OUTPATIENT
Start: 2024-10-27 | End: 2024-10-28 | Stop reason: HOSPADM

## 2024-10-27 RX ORDER — VITAMIN A ACETATE, BETA CAROTENE, ASCORBIC ACID, CHOLECALCIFEROL, .ALPHA.-TOCOPHEROL ACETATE, DL-, THIAMINE MONONITRATE, RIBOFLAVIN, NIACINAMIDE, PYRIDOXINE HYDROCHLORIDE, FOLIC ACID, CYANOCOBALAMIN, CALCIUM CARBONATE, FERROUS FUMARATE, ZINC OXIDE, CUPRIC OXIDE 3080; 12; 120; 400; 1; 1.84; 3; 20; 22; 920; 25; 200; 27; 10; 2 [IU]/1; UG/1; MG/1; [IU]/1; MG/1; MG/1; MG/1; MG/1; MG/1; [IU]/1; MG/1; MG/1; MG/1; MG/1; MG/1
1 TABLET, FILM COATED ORAL EVERY MORNING
Status: DISCONTINUED | OUTPATIENT
Start: 2024-10-27 | End: 2024-10-28 | Stop reason: HOSPADM

## 2024-10-27 RX ADMIN — IBUPROFEN 800 MG: 800 TABLET, FILM COATED ORAL at 09:17

## 2024-10-27 RX ADMIN — PRENATAL WITH FERROUS FUM AND FOLIC ACID 1 TABLET: 3080; 920; 120; 400; 22; 1.84; 3; 20; 10; 1; 12; 200; 27; 25; 2 TABLET ORAL at 09:19

## 2024-10-27 RX ADMIN — IBUPROFEN 800 MG: 800 TABLET, FILM COATED ORAL at 19:31

## 2024-10-27 RX ADMIN — ACETAMINOPHEN 1000 MG: 500 TABLET ORAL at 19:29

## 2024-10-27 RX ADMIN — ACETAMINOPHEN 1000 MG: 500 TABLET ORAL at 09:17

## 2024-10-27 RX ADMIN — DOCUSATE SODIUM 100 MG: 100 CAPSULE, LIQUID FILLED ORAL at 19:29

## 2024-10-27 RX ADMIN — ACETAMINOPHEN 1000 MG: 500 TABLET ORAL at 01:27

## 2024-10-27 RX ADMIN — IBUPROFEN 800 MG: 800 TABLET, FILM COATED ORAL at 01:27

## 2024-10-27 ASSESSMENT — PAIN DESCRIPTION - PAIN TYPE
TYPE: ACUTE PAIN

## 2024-10-27 ASSESSMENT — PATIENT HEALTH QUESTIONNAIRE - PHQ9
SUM OF ALL RESPONSES TO PHQ9 QUESTIONS 1 AND 2: 0
1. LITTLE INTEREST OR PLEASURE IN DOING THINGS: NOT AT ALL
2. FEELING DOWN, DEPRESSED, IRRITABLE, OR HOPELESS: NOT AT ALL

## 2024-10-27 ASSESSMENT — EDINBURGH POSTNATAL DEPRESSION SCALE (EPDS)
THE THOUGHT OF HARMING MYSELF HAS OCCURRED TO ME: NEVER
I HAVE FELT SCARED OR PANICKY FOR NO GOOD REASON: NO, NOT MUCH
I HAVE BEEN SO UNHAPPY THAT I HAVE HAD DIFFICULTY SLEEPING: NOT AT ALL
I HAVE BLAMED MYSELF UNNECESSARILY WHEN THINGS WENT WRONG: NOT VERY OFTEN
I HAVE BEEN ANXIOUS OR WORRIED FOR NO GOOD REASON: HARDLY EVER
I HAVE LOOKED FORWARD WITH ENJOYMENT TO THINGS: AS MUCH AS I EVER DID
I HAVE BEEN SO UNHAPPY THAT I HAVE BEEN CRYING: NO, NEVER
I HAVE FELT SAD OR MISERABLE: NO, NOT AT ALL
I HAVE BEEN ABLE TO LAUGH AND SEE THE FUNNY SIDE OF THINGS: AS MUCH AS I ALWAYS COULD
THINGS HAVE BEEN GETTING ON TOP OF ME: NO, MOST OF THE TIME I HAVE COPED QUITE WELL

## 2024-10-28 ENCOUNTER — PHARMACY VISIT (OUTPATIENT)
Dept: PHARMACY | Facility: MEDICAL CENTER | Age: 24
End: 2024-10-28
Payer: MEDICARE

## 2024-10-28 VITALS
WEIGHT: 266 LBS | BODY MASS INDEX: 47.13 KG/M2 | DIASTOLIC BLOOD PRESSURE: 64 MMHG | OXYGEN SATURATION: 96 % | HEART RATE: 66 BPM | SYSTOLIC BLOOD PRESSURE: 129 MMHG | HEIGHT: 63 IN | TEMPERATURE: 97.8 F | RESPIRATION RATE: 18 BRPM

## 2024-10-28 PROBLEM — R03.0 ELEVATED BLOOD PRESSURE READING WITHOUT DIAGNOSIS OF HYPERTENSION: Status: RESOLVED | Noted: 2024-10-05 | Resolved: 2024-10-28

## 2024-10-28 PROBLEM — Z34.80 SUPERVISION OF OTHER NORMAL PREGNANCY, ANTEPARTUM: Status: RESOLVED | Noted: 2024-06-05 | Resolved: 2024-10-28

## 2024-10-28 PROBLEM — D62 ACUTE BLOOD LOSS ANEMIA (ABLA): Status: ACTIVE | Noted: 2024-10-28

## 2024-10-28 PROCEDURE — A9270 NON-COVERED ITEM OR SERVICE: HCPCS | Performed by: ADVANCED PRACTICE MIDWIFE

## 2024-10-28 PROCEDURE — 700102 HCHG RX REV CODE 250 W/ 637 OVERRIDE(OP): Performed by: FAMILY MEDICINE

## 2024-10-28 PROCEDURE — A9270 NON-COVERED ITEM OR SERVICE: HCPCS | Performed by: FAMILY MEDICINE

## 2024-10-28 PROCEDURE — RXMED WILLOW AMBULATORY MEDICATION CHARGE: Performed by: FAMILY MEDICINE

## 2024-10-28 PROCEDURE — 700102 HCHG RX REV CODE 250 W/ 637 OVERRIDE(OP): Performed by: ADVANCED PRACTICE MIDWIFE

## 2024-10-28 RX ORDER — MEDROXYPROGESTERONE ACETATE 150 MG/ML
150 INJECTION, SUSPENSION INTRAMUSCULAR ONCE
Status: DISCONTINUED | OUTPATIENT
Start: 2024-10-28 | End: 2024-10-28 | Stop reason: HOSPADM

## 2024-10-28 RX ORDER — ACETAMINOPHEN 500 MG
500-1000 TABLET ORAL EVERY 6 HOURS PRN
Qty: 60 TABLET | Refills: 0 | Status: SHIPPED | OUTPATIENT
Start: 2024-10-28

## 2024-10-28 RX ORDER — POLYETHYLENE GLYCOL 3350 17 G/17G
1 POWDER, FOR SOLUTION ORAL DAILY
Status: DISCONTINUED | OUTPATIENT
Start: 2024-10-28 | End: 2024-10-28 | Stop reason: HOSPADM

## 2024-10-28 RX ORDER — POLYETHYLENE GLYCOL 3350 17 G/17G
17 POWDER, FOR SOLUTION ORAL DAILY
Qty: 510 G | Refills: 3 | Status: SHIPPED | OUTPATIENT
Start: 2024-10-28

## 2024-10-28 RX ORDER — FERROUS SULFATE 325(65) MG
325 TABLET ORAL
Qty: 90 TABLET | Refills: 1 | Status: SHIPPED | OUTPATIENT
Start: 2024-10-28

## 2024-10-28 RX ORDER — IBUPROFEN 800 MG/1
800 TABLET, FILM COATED ORAL EVERY 8 HOURS PRN
Qty: 30 TABLET | Refills: 0 | Status: SHIPPED | OUTPATIENT
Start: 2024-10-28

## 2024-10-28 RX ORDER — ASCORBIC ACID 500 MG
500 TABLET ORAL
Qty: 90 TABLET | Refills: 1 | Status: SHIPPED | OUTPATIENT
Start: 2024-10-28

## 2024-10-28 RX ADMIN — IBUPROFEN 800 MG: 800 TABLET, FILM COATED ORAL at 06:38

## 2024-10-28 RX ADMIN — POLYETHYLENE GLYCOL 3350 1 PACKET: 17 POWDER, FOR SOLUTION ORAL at 07:43

## 2024-10-28 RX ADMIN — PRENATAL WITH FERROUS FUM AND FOLIC ACID 1 TABLET: 3080; 920; 120; 400; 22; 1.84; 3; 20; 10; 1; 12; 200; 27; 25; 2 TABLET ORAL at 06:38

## 2024-10-28 RX ADMIN — ACETAMINOPHEN 1000 MG: 500 TABLET ORAL at 06:38

## 2024-10-28 ASSESSMENT — PAIN DESCRIPTION - PAIN TYPE: TYPE: ACUTE PAIN

## 2024-10-28 ASSESSMENT — SOCIAL DETERMINANTS OF HEALTH (SDOH): WITHIN THE PAST 12 MONTHS, YOU WORRIED THAT YOUR FOOD WOULD RUN OUT BEFORE YOU GOT THE MONEY TO BUY MORE: NEVER TRUE

## 2024-10-30 ENCOUNTER — LACTATION ENCOUNTER (OUTPATIENT)
Dept: OTHER | Facility: MEDICAL CENTER | Age: 24
End: 2024-10-30

## 2024-12-09 ENCOUNTER — POST PARTUM (OUTPATIENT)
Dept: OBGYN | Facility: CLINIC | Age: 24
End: 2024-12-09
Payer: COMMERCIAL

## 2024-12-09 VITALS — DIASTOLIC BLOOD PRESSURE: 50 MMHG | SYSTOLIC BLOOD PRESSURE: 96 MMHG | BODY MASS INDEX: 44.29 KG/M2 | WEIGHT: 250 LBS

## 2024-12-09 PROBLEM — Z34.90 ENCOUNTER FOR INDUCTION OF LABOR: Status: RESOLVED | Noted: 2024-10-26 | Resolved: 2024-12-09

## 2024-12-09 PROBLEM — D62 ACUTE BLOOD LOSS ANEMIA (ABLA): Status: RESOLVED | Noted: 2024-10-28 | Resolved: 2024-12-09

## 2024-12-09 PROBLEM — R82.71 GROUP B STREPTOCOCCAL BACTERIURIA: Status: RESOLVED | Noted: 2024-04-11 | Resolved: 2024-12-09

## 2024-12-09 PROBLEM — O98.211 GONORRHEA AFFECTING PREGNANCY IN FIRST TRIMESTER: Status: RESOLVED | Noted: 2024-04-11 | Resolved: 2024-12-09

## 2024-12-09 PROCEDURE — 0502F SUBSEQUENT PRENATAL CARE: CPT | Performed by: ADVANCED PRACTICE MIDWIFE

## 2024-12-09 PROCEDURE — 3078F DIAST BP <80 MM HG: CPT | Performed by: ADVANCED PRACTICE MIDWIFE

## 2024-12-09 PROCEDURE — 3074F SYST BP LT 130 MM HG: CPT | Performed by: ADVANCED PRACTICE MIDWIFE

## 2024-12-09 RX ORDER — ACETAMINOPHEN AND CODEINE PHOSPHATE 120; 12 MG/5ML; MG/5ML
1 SOLUTION ORAL DAILY
Qty: 28 TABLET | Refills: 3 | Status: SHIPPED | OUTPATIENT
Start: 2024-12-09

## 2024-12-09 ASSESSMENT — EDINBURGH POSTNATAL DEPRESSION SCALE (EPDS)
I HAVE LOOKED FORWARD WITH ENJOYMENT TO THINGS: AS MUCH AS I EVER DID
THINGS HAVE BEEN GETTING ON TOP OF ME: NO, I HAVE BEEN COPING AS WELL AS EVER
TOTAL SCORE: 4
I HAVE BEEN ABLE TO LAUGH AND SEE THE FUNNY SIDE OF THINGS: AS MUCH AS I ALWAYS COULD
I HAVE FELT SCARED OR PANICKY FOR NO GOOD REASON: NO, NOT MUCH
I HAVE BEEN SO UNHAPPY THAT I HAVE HAD DIFFICULTY SLEEPING: NOT AT ALL
I HAVE BEEN ANXIOUS OR WORRIED FOR NO GOOD REASON: HARDLY EVER
I HAVE BLAMED MYSELF UNNECESSARILY WHEN THINGS WENT WRONG: NOT VERY OFTEN
THE THOUGHT OF HARMING MYSELF HAS OCCURRED TO ME: NEVER
I HAVE FELT SAD OR MISERABLE: NO, NOT AT ALL
I HAVE BEEN SO UNHAPPY THAT I HAVE BEEN CRYING: ONLY OCCASIONALLY

## 2024-12-09 ASSESSMENT — FIBROSIS 4 INDEX: FIB4 SCORE: 0.62

## 2024-12-09 NOTE — PROGRESS NOTES
Pt here today for postpartum exam.  Delivery Date 10/26/2024   Currently: breast and bottle feeding formula   BCM: Pt would like to discuss all options, information given on [facility] and [facility].   Good ph:[phone]  Pt states no complaints   Chaperone offered and not indicated  Last PAP: 4/12/23, WNL

## 2024-12-09 NOTE — PROGRESS NOTES
Subjective   Subjective:    Chen Gupta is a 24 y.o. female who presents for her postpartum exam 6 weeks following . Her prenatal course was complicated by gonorrhea infection, gestational hypertension in labor, multicystic kidney disease in fetus.  Her delivery was uncomplicated. Her method of feeding infant is breast and formula. She desires possible IUD vs pills for her birth control method. Reports no sex prior to this appointment. Patient denies crying spells, irritability, or mood swings.     Pap WNL on 2023    Eating a regular diet without difficulty.   Bowel movement are Normal.      Breast problems no  Dysuria no  Vaginal bleeding no  Vaginal itching no      Problem List     Patient Active Problem List    Diagnosis Date Noted    Vaginal delivery 10/28/2024    Gestational hypertension, third trimester 10/26/2024    Family history of ovarian cancer 2024    History of ectopic pregnancy 2023    Acanthosis nigricans 2016       Objective    EDPS 4      Lab:No results found for this or any previous visit (from the past 6 weeks).  Vitals:    24 1000   BP: 96/50   Weight: 250 lb     Vitals:    24 1000   BP: 96/50     Objective    Well nourished female in no acute distress  A& O x 3  Respirations clear and non labored on room air  No edema noted and pulses WNL bilaterally in lower extremities; no claudication  BS x 4; no guarding or tenderness  Breasts: no erythema or discharge. No masses or tenderness.       Assessment   Assessment:    1. Postpartum Exam  2. General Counseling and Advice on Contraception  3. Screening for Postpartum Depression    Plan   Plan:    1. Breastfeeding support   2. Continue PNV   3. Contraceptive counseling- discussed start of mini pill first and detemrine if she tolerates hormone. If she does, likely appropriate for Mirena IUD placement. If not, likely Paragard would be most appropriate.   4. Discussed diet, exercise and resumption of  sexual activity.  Discussed signs and symptoms of stress incontinence and reviewed pelvic floor exercises.    5. Follow up 2 months.

## 2025-02-07 ENCOUNTER — OFFICE VISIT (OUTPATIENT)
Dept: OBGYN | Facility: CLINIC | Age: 25
End: 2025-02-07
Payer: COMMERCIAL

## 2025-02-07 VITALS — SYSTOLIC BLOOD PRESSURE: 124 MMHG | WEIGHT: 252 LBS | BODY MASS INDEX: 44.64 KG/M2 | DIASTOLIC BLOOD PRESSURE: 74 MMHG

## 2025-02-07 DIAGNOSIS — N76.2 ACUTE VULVITIS: ICD-10-CM

## 2025-02-07 DIAGNOSIS — Z30.09 ENCOUNTER FOR OTHER GENERAL COUNSELING OR ADVICE ON CONTRACEPTION: ICD-10-CM

## 2025-02-07 PROCEDURE — 3078F DIAST BP <80 MM HG: CPT | Performed by: ADVANCED PRACTICE MIDWIFE

## 2025-02-07 PROCEDURE — 99213 OFFICE O/P EST LOW 20 MIN: CPT | Performed by: ADVANCED PRACTICE MIDWIFE

## 2025-02-07 PROCEDURE — 3074F SYST BP LT 130 MM HG: CPT | Performed by: ADVANCED PRACTICE MIDWIFE

## 2025-02-07 RX ORDER — ACETAMINOPHEN AND CODEINE PHOSPHATE 120; 12 MG/5ML; MG/5ML
1 SOLUTION ORAL DAILY
Qty: 28 TABLET | Refills: 11 | Status: SHIPPED | OUTPATIENT
Start: 2025-02-07

## 2025-02-07 ASSESSMENT — FIBROSIS 4 INDEX: FIB4 SCORE: 0.62

## 2025-02-07 NOTE — PROGRESS NOTES
Chief Complaint   Patient presents with    Gynecologic Exam       History of present illness: 24 y.o.  presents with above chief complaint. Pt is interested in birth control, specifically IUDs.  She is currently using mini pill.    Had  3 months ago. Was unsure on birth control method. Was considering Mirena but was concerned about hormones. Was then interested in Paragard but now desires to continue mini pills.    She expresses concern today regarding vaginal irritation about clitoris. She is concerned this is related to post birth healing. She did have abrasion at time of delivery but non required repair. She notices burning sensation when washing vaginal area or with shaving.     Review of systems:  Pertinent positives documented in HPI and all other systems reviewed & are negative    All PMH, PSH, allergies, social history and FH reviewed and updated today:  Past Medical History:   Diagnosis Date    Encounter for induction of labor 10/26/2024    GERD (gastroesophageal reflux disease)        Past Surgical History:   Procedure Laterality Date    ME LAP,DIAGNOSTIC ABDOMEN Left 2023    Procedure: PELVISCOPY FOR RUPTURED ECTOPIC;  Surgeon: Martha Jacobo D.O.;  Location: SURGERY [facility];  Service: Obstetrics    SALPINGECTOMY Left 2023    Procedure: SALPINGECTOMY;  Surgeon: Martha Jacobo D.O.;  Location: SURGERY [facility];  Service: Obstetrics       Allergies: No Known Allergies    Social History     Socioeconomic History    Marital status:      Spouse name: Not on file    Number of children: Not on file    Years of education: Not on file    Highest education level: Not on file   Occupational History    Not on file   Tobacco Use    Smoking status: Never    Smokeless tobacco: Never   Vaping Use    Vaping status: Never Used   Substance and Sexual Activity    Alcohol use: Not Currently     Comment: socially    Drug use: No    Sexual activity: Yes     Partners: Male     Birth  control/protection: None     Comment: None   Other Topics Concern    Behavioral problems Not Asked    Interpersonal relationships Not Asked    Sad or not enjoying activities Not Asked    Suicidal thoughts Not Asked    Poor school performance Not Asked    Reading difficulties Not Asked    Speech difficulties Not Asked    Writing difficulties Not Asked    Inadequate sleep Not Asked    Excessive TV viewing Not Asked    Excessive video game use Not Asked    Inadequate exercise Not Asked    Sports related Not Asked    Poor diet Not Asked    Family concerns for drug/alcohol abuse Not Asked    Poor oral hygiene Not Asked    Bike safety Not Asked    Family concerns vehicle safety Not Asked   Social History Narrative    Not on file     Social Drivers of Health     Financial Resource Strain: Not on file   Food Insecurity: No Food Insecurity (10/28/2024)    Hunger Vital Sign     Worried About Running Out of Food in the Last Year: Never true     Ran Out of Food in the Last Year: Never true   Transportation Needs: No Transportation Needs (10/26/2024)    PRAPARE - Transportation     Lack of Transportation (Medical): No     Lack of Transportation (Non-Medical): No   Physical Activity: Not on file   Stress: Not on file   Social Connections: Not on file   Intimate Partner Violence: Not At Risk (10/26/2024)    Humiliation, Afraid, Rape, and Kick questionnaire     Fear of Current or Ex-Partner: No     Emotionally Abused: No     Physically Abused: No     Sexually Abused: No   Housing Stability: Low Risk  (10/28/2024)    Housing Stability Vital Sign     Unable to Pay for Housing in the Last Year: No     Number of Times Moved in the Last Year: 0     Homeless in the Last Year: No       Family History   Problem Relation Age of Onset    Ovarian Cancer Mother     Heart Disease Father     Diabetes Sister     Diabetes Maternal Grandmother     No Known Problems Paternal Grandmother     No Known Problems Paternal Grandfather        Physical  exam:  Wt 252 lb     GENERAL APPEARANCE: healthy, alert, no distress  HEART RRR with normal S1 and S2 ,no murmurs, no gallops, no peripheral edema  LUNG clear to auscultation, normal respiratory effort  EXTREMITIES:negative clubbing, cyanosis, edema    NEURO Awake, alert and oriented x 3, Normal gait, no sensory deficits  PSYCHIATRIC: Patient shows appropriate affect, is alert and oriented x3, intact judgment and insight.    Assessment/Plan:  1. Encounter for other general counseling or advice on contraception        2. Acute vulvitis            Counseling/coordination of care of birth control options including medical and surgical methods. Discussed in detail risk and benefits of OCP, NuvaRing, Patch, Depo, IUDs, Nexplanon as well as the normal side effects of each. Questions answered. Patient desires mini pill for birth control and Rx sent.     Discussed normal healing in vulva. At this time, use barrier ointment or vaseline to area.

## 2025-02-07 NOTE — PROGRESS NOTES
Pt here for GYN exam.    Pt states she would like to continue the BC pills.   Good# [phone]  LMP:1/8/2025  Pharmacy confirmed.

## 2025-05-05 ENCOUNTER — TELEPHONE (OUTPATIENT)
Dept: OBGYN | Facility: CLINIC | Age: 25
End: 2025-05-05
Payer: COMMERCIAL

## 2025-05-05 NOTE — TELEPHONE ENCOUNTER
Left Vm to Pt to let her know her appt was scheduled at the wrong location. R/S pt on 06/3 at 7:30.

## 2025-05-15 ENCOUNTER — APPOINTMENT (OUTPATIENT)
Dept: OBGYN | Facility: CLINIC | Age: 25
End: 2025-05-15
Payer: COMMERCIAL

## 2025-06-03 ENCOUNTER — APPOINTMENT (OUTPATIENT)
Dept: OBGYN | Facility: CLINIC | Age: 25
End: 2025-06-03
Payer: COMMERCIAL

## 2025-06-12 ENCOUNTER — TELEPHONE (OUTPATIENT)
Dept: OBGYN | Facility: CLINIC | Age: 25
End: 2025-06-12
Payer: COMMERCIAL

## 2025-06-12 NOTE — TELEPHONE ENCOUNTER
Left message informing pt that appt for 06/13/25 will be cxl as the provider has called out. Informed pt to call the office to get rescheduled. MD@10:26AM

## 2025-06-13 ENCOUNTER — APPOINTMENT (OUTPATIENT)
Dept: OBGYN | Facility: CLINIC | Age: 25
End: 2025-06-13
Payer: COMMERCIAL

## 2025-06-18 ENCOUNTER — GYNECOLOGY VISIT (OUTPATIENT)
Dept: OBGYN | Facility: CLINIC | Age: 25
End: 2025-06-18
Payer: COMMERCIAL

## 2025-06-18 ENCOUNTER — RESEARCH ENCOUNTER (OUTPATIENT)
Dept: RESEARCH | Facility: MEDICAL CENTER | Age: 25
End: 2025-06-18

## 2025-06-18 VITALS
BODY MASS INDEX: 42.96 KG/M2 | DIASTOLIC BLOOD PRESSURE: 68 MMHG | WEIGHT: 242.51 LBS | SYSTOLIC BLOOD PRESSURE: 122 MMHG

## 2025-06-18 DIAGNOSIS — Z00.6 CLINICAL TRIAL PARTICIPANT: ICD-10-CM

## 2025-06-18 DIAGNOSIS — Z30.430 ENCOUNTER FOR INSERTION OF MIRENA IUD: Primary | ICD-10-CM

## 2025-06-18 DIAGNOSIS — Z80.41 FAMILY HISTORY OF OVARIAN CANCER: ICD-10-CM

## 2025-06-18 LAB
POCT INT CON NEG: NEGATIVE
POCT INT CON POS: POSITIVE
POCT URINE PREGNANCY TEST: NEGATIVE

## 2025-06-18 PROCEDURE — 81025 URINE PREGNANCY TEST: CPT | Performed by: ADVANCED PRACTICE MIDWIFE

## 2025-06-18 PROCEDURE — 58300 INSERT INTRAUTERINE DEVICE: CPT | Performed by: ADVANCED PRACTICE MIDWIFE

## 2025-06-18 ASSESSMENT — FIBROSIS 4 INDEX: FIB4 SCORE: 0.62

## 2025-06-18 NOTE — PROGRESS NOTES
Pt here for Mirena insertion. Pregnancy test was negative. Consent signed. Reminder card given.    Pt states no complaints   Good# [phone]  LMP: 5/31/2025  Pharmacy confirmed.

## 2025-06-18 NOTE — PROGRESS NOTES
Chief Complaint   Patient presents with    Procedure       History of present illness: 24 y.o.  presents with above chief complaint. Pt is interested in birth control, specifically Mirena.  She is currently using pills.  Birth control methods used in the past are Mirena and pills      IUD: Mirena is choice:    Today the patient is counseled on the risks of IUD insertion. Specifically discussed were alternative forms of birth control. I also discussed with the patient the risk of infection on insertion, and had asked the patient to remain on pelvic rest for one week following the insertion. We also discussed the risk of IUD expulsion, the risk of uterine perforation and IUD migration. If the IUD does migrate the patient may require a separate procedure such as a laparoscopy to retrieve the migrated IUD. I also discussed the 1% risk of pregnancy with IUD use. I also discussed the side effects of Mirena IUD which can be amenorrhea or dysfunctional uterine bleeding or spotting.  Patient had the opportunity to ask questions regarding insertion, risks and benefits, all questions are answered in their entirety.  Informed consent is signed    Vital measurements:  /68   Wt 242 lb 8.1 oz   Body mass index is 42.96 kg/m². (Goal BM I>18 <25)    Physical Exam   Nursing note and vitals reviewed.  Constitutional: She is oriented to person, place, and time. She appears well-developed and well-nourished. No distress.     Genitourinary:  Pelvic exam was performed with patient supine.  External genitalia with no abnormal pigmentation, labial fusion,rash, tenderness, lesion or injury to the labia bilaterally.  Vagina is moist with no lesions, foul discharge, erythema, tenderness or bleeding. No foreign body around the vagina or signs of injury.   Cervix exhibits no motion tenderness, no discharge and no friability.   Uterus is anteverted not deviated, not enlarged, not fixed and not tender.  Right adnexum displays no  mass, no tenderness and no fullness. Left adnexum displays no mass, no tenderness and no fullness.       Psychiatric: She has a normal mood and affect. Her behavior is normal. Judgment and thought content normal.       Procedure note  Urine pregnancy test is negative, informed consent was previously signed  The bimanual exam is performed the uterus is noted to be 10 weeks in size and is mid position  A speculum was inserted into the vagina, the cervix was cleansed with Betadine swabs x3  Tenaculum was placed on the anterior lip of the cervix at 11:00 and 1:00   The uterus was sounded to 9 centimeters  The IUD is placed under sterile conditions:   The strings trimmed to approximately 3 cm  Tenaculum was removed from the cervix and hemostasis was achieved with pressure only  The patient tolerated the procedure well      Assessment  1. Encounter for insertion of intrauterine device  -Patient is asked to followup in 4-6 weeks for IUD check. The patient is asked to remain on pelvic rest for 2-3 days.  Use a backup method for 7 days, condoms. She is asked to return sooner than 4-6 weeks for heavy vaginal bleeding uncontrolled pain or fever    BTL consent is resigned today.

## 2025-06-19 NOTE — RESEARCH NOTE
Patient has been referred by JUAN Bruce. The initial referral follow-up message, which includes the consent form link, has been sent to the patient.    Eligible Studies: HNP

## 2025-06-19 NOTE — RESEARCH NOTE
Patient has signed the consent form.    The applicable research collection order(s) have been created, triggering the scheduling ticket to be sent to the patient via [employer].Follow-up message has been sent to the patient.    Patient is ready for scheduling.

## 2025-07-11 ENCOUNTER — APPOINTMENT (OUTPATIENT)
Dept: LAB | Facility: MEDICAL CENTER | Age: 25
End: 2025-07-11
Attending: FAMILY MEDICINE
Payer: COMMERCIAL

## 2025-07-11 DIAGNOSIS — Z00.6 CLINICAL TRIAL PARTICIPANT: ICD-10-CM

## 2025-07-18 ENCOUNTER — APPOINTMENT (OUTPATIENT)
Dept: OBGYN | Facility: CLINIC | Age: 25
End: 2025-07-18
Payer: COMMERCIAL

## 2025-07-21 ENCOUNTER — RESULTS FOLLOW-UP (OUTPATIENT)
Dept: MEDICAL GROUP | Facility: PHYSICIAN GROUP | Age: 25
End: 2025-07-21
Payer: COMMERCIAL

## 2025-07-21 LAB
APOB+LDLR+PCSK9 GENE MUT ANL BLD/T: NOT DETECTED
BRCA1+BRCA2 DEL+DUP + FULL MUT ANL BLD/T: NOT DETECTED
MLH1+MSH2+MSH6+PMS2 GN DEL+DUP+FUL M: NOT DETECTED

## 2025-08-18 ENCOUNTER — TELEPHONE (OUTPATIENT)
Dept: OBGYN | Facility: CLINIC | Age: 25
End: 2025-08-18
Payer: COMMERCIAL

## 2025-08-19 ENCOUNTER — APPOINTMENT (OUTPATIENT)
Dept: OBGYN | Facility: CLINIC | Age: 25
End: 2025-08-19
Payer: COMMERCIAL

## (undated) DEVICE — SODIUM CHL. INJ. 0.9% 500ML (24EA/CA 50CA/PF)

## (undated) DEVICE — CANNULA W/ SUPPLY TUBING O2 - (50/CA)

## (undated) DEVICE — DRAPESURG STERI-DRAPE LONG - (10/BX 4BX/CA)

## (undated) DEVICE — SET EXTENSION WITH 2 PORTS (48EA/CA) ***PART #2C8610 IS A SUBSTITUTE*****

## (undated) DEVICE — SET SUCTION/IRRIGATION WITH DISPOSABLE TIP (6/CA )PART #0250-070-520 IS A SUB

## (undated) DEVICE — SUTURE 4-0 MONOCRYL PLUS PS-2 - 27 INCH (36/BX)

## (undated) DEVICE — SUTURE GENERAL

## (undated) DEVICE — GOWN WARMING STANDARD FLEX - (30/CA)

## (undated) DEVICE — COVER LIGHT HANDLE ALC PLUS DISP (18EA/BX)

## (undated) DEVICE — KIT  I.V. START (100EA/CA)

## (undated) DEVICE — SLEEVE VASO CALF MED - (10PR/CA)

## (undated) DEVICE — SODIUM CHL IRRIGATION 0.9% 1000ML (12EA/CA)

## (undated) DEVICE — DRESSING TRANSPARENT FILM TEGADERM 2.375 X 2.75"  (100EA/BX)"

## (undated) DEVICE — MASK OXYGEN VNYL ADLT MED CONC WITH 7 FOOT TUBING  - (50EA/CA)

## (undated) DEVICE — PACK LAPAROSCOPY - (1/CA)

## (undated) DEVICE — SENSOR OXIMETER ADULT SPO2 RD SET (20EA/BX)

## (undated) DEVICE — SET LEADWIRE 5 LEAD BEDSIDE DISPOSABLE ECG (1SET OF 5/EA)

## (undated) DEVICE — CANISTER SUCTION 3000ML MECHANICAL FILTER AUTO SHUTOFF MEDI-VAC NONSTERILE LF DISP  (40EA/CA)

## (undated) DEVICE — GLOVE BIOGEL SZ 6 PF LATEX - (50EA/BX 4BX/CA)

## (undated) DEVICE — TOWEL STOP TIMEOUT SAFETY FLAG (40EA/CA)

## (undated) DEVICE — PAD SANITARY 11IN MAXI IND WRAPPED  (12EA/PK 24PK/CA)

## (undated) DEVICE — TROCAR 5X100 SEPARTATOR ADV - FIXATION (6/BX)

## (undated) DEVICE — JELLY SURGILUBE STERILE TUBE 4.25 OZ (1/EA)

## (undated) DEVICE — LACTATED RINGERS INJ 1000 ML - (14EA/CA 60CA/PF)

## (undated) DEVICE — TUBING CLEARLINK DUO-VENT - C-FLO (48EA/CA)

## (undated) DEVICE — GLOVE BIOGEL INDICATOR SZ 6.5 SURGICAL PF LTX - (50PR/BX 4BX/CA)